# Patient Record
Sex: FEMALE | Race: WHITE | Employment: OTHER | URBAN - METROPOLITAN AREA
[De-identification: names, ages, dates, MRNs, and addresses within clinical notes are randomized per-mention and may not be internally consistent; named-entity substitution may affect disease eponyms.]

---

## 2017-05-24 ENCOUNTER — HOSPITAL ENCOUNTER (OUTPATIENT)
Dept: WOMENS IMAGING | Age: 82
Discharge: HOME | End: 2017-05-24
Payer: MEDICARE

## 2017-05-24 DIAGNOSIS — Z12.31 ENCOUNTER FOR SCREENING MAMMOGRAM FOR BREAST CANCER: ICD-10-CM

## 2017-05-24 PROCEDURE — G0202 SCR MAMMO BI INCL CAD: HCPCS

## 2017-05-31 ENCOUNTER — OFFICE VISIT (OUTPATIENT)
Dept: SURGERY | Age: 82
End: 2017-05-31

## 2017-05-31 VITALS
WEIGHT: 161.2 LBS | DIASTOLIC BLOOD PRESSURE: 72 MMHG | BODY MASS INDEX: 29.66 KG/M2 | HEIGHT: 62 IN | SYSTOLIC BLOOD PRESSURE: 136 MMHG | TEMPERATURE: 98.1 F

## 2017-05-31 DIAGNOSIS — N60.12 FIBROCYSTIC CHANGES OF LEFT BREAST: Primary | ICD-10-CM

## 2017-05-31 PROCEDURE — 99212 OFFICE O/P EST SF 10 MIN: CPT | Performed by: SURGERY

## 2017-05-31 RX ORDER — AMOXICILLIN AND CLAVULANATE POTASSIUM 875; 125 MG/1; MG/1
TABLET, FILM COATED ORAL
COMMUNITY
Start: 2017-05-26 | End: 2017-06-05

## 2018-03-27 ENCOUNTER — OFFICE VISIT (OUTPATIENT)
Dept: GASTROENTEROLOGY | Age: 83
End: 2018-03-27
Payer: MEDICARE

## 2018-03-27 VITALS
WEIGHT: 157 LBS | BODY MASS INDEX: 26.8 KG/M2 | SYSTOLIC BLOOD PRESSURE: 158 MMHG | RESPIRATION RATE: 16 BRPM | DIASTOLIC BLOOD PRESSURE: 76 MMHG | HEIGHT: 64 IN | HEART RATE: 74 BPM

## 2018-03-27 DIAGNOSIS — K92.1 BLACK STOOL: Primary | ICD-10-CM

## 2018-03-27 DIAGNOSIS — Z85.038 PERSONAL HISTORY OF COLON CANCER: ICD-10-CM

## 2018-03-27 PROCEDURE — 99203 OFFICE O/P NEW LOW 30 MIN: CPT | Performed by: INTERNAL MEDICINE

## 2018-03-27 NOTE — PROGRESS NOTES
Liset Pimentel  [de-identified] y.o. female  Referred by: Clau Hill MD    Medicines, social history, past medical history, surgical history, and allergies have been reviewed and updated as needed. Chief Complaint   Patient presents with    Stool Color Change     Black stools         HPI:   Patient is an [de-identified]year old WF with black stool for 3 days. Patient denies heartburn, indigestion or abdominal pain. Patient is S/P colon carcinomas and is due for a surveillance colonoscopy this year. ROS:    CONSTITUTIONAL:  Negative  EYES:  Negative  ENMT:  Negative  MUSCULOSKELETAL:  Negative  NEUROLOGICAL:  Negative  INTEGUMENTARY:  Negative  GASTROINTESTINAL:  See HPI  GENITOURINARY:  Negative  CARDIOVASCULAR:  Negative  RESPIRATORY:  Negative  HEM/LYMPH:  Negative  ENDOCRINE:  Negative  PSYCHIATRIC:  Negative  ALLERGIC/IMMUNO:  Negative  EXTREMITIES:  Negative  BREAST:  Negative        Physical Exam:  BP (!) 158/76 (Site: Left Arm, Position: Sitting)   Pulse 74   Resp 16   Ht 5' 4\" (1.626 m)   Wt 157 lb (71.2 kg)   BMI 26.95 kg/m²   Constitutional:  Patient appears well nourished, well developed, and hydrated. Alert and oriented x3 and appropriate. Non icteric and not pale. Eyes:  Pupils equal and reactive. Oropharynx:  Unremarkable. Neck/Thyroid: Neck is supple. No palpable nodules. No carotid bruits. Thyroid is symmetrical, without thyromegaly, masses, or palpable nodules. Respiratory:  Normal to inspection. Lungs clear to auscultation and percussion. No wheezes rhonchi or rales. Cardiovascular:  Regular rate and rhythm. No murmurs, gallops, or rubs. Abdomen:  Soft, no tenderness and non-distended. No organomegaly. No masses. No rebound or guarding. Normal bowel sounds. Integumentary:  The skin is unremarkable. No rashes. No suspicious lesions. Warm and dry. Neuro: Grossly intact. Cranial nerves, sensation and reflexes grossly intact. Musculoskeletal:  Unremarkable. Assessment:  1.  Black stool 2. Personal history of colon cancer         Plan:  Colonoscopy and EGD 3/30/18      Prep:  Jessica Gtz, transcribed the above note for Dr Velinda Sacks. Electronically signed by Leroy Marmolejo 3/27/18 4:25 PM        I , Velinda Sacks, have read and agree with the above documentation.   Electronically signed by Velinda Sacks, M.D. 3/28/18 3:58 PM

## 2018-03-30 ENCOUNTER — ANESTHESIA (OUTPATIENT)
Dept: ENDOSCOPY | Age: 83
End: 2018-03-30
Payer: MEDICARE

## 2018-03-30 ENCOUNTER — HOSPITAL ENCOUNTER (OUTPATIENT)
Age: 83
Setting detail: OUTPATIENT SURGERY
Discharge: HOME | End: 2018-03-30
Attending: INTERNAL MEDICINE | Admitting: INTERNAL MEDICINE
Payer: MEDICARE

## 2018-03-30 ENCOUNTER — ANESTHESIA EVENT (OUTPATIENT)
Dept: ENDOSCOPY | Age: 83
End: 2018-03-30
Payer: MEDICARE

## 2018-03-30 VITALS
SYSTOLIC BLOOD PRESSURE: 112 MMHG | DIASTOLIC BLOOD PRESSURE: 60 MMHG | RESPIRATION RATE: 16 BRPM | BODY MASS INDEX: 26.8 KG/M2 | OXYGEN SATURATION: 99 % | HEIGHT: 64 IN | TEMPERATURE: 97.9 F | WEIGHT: 157 LBS | HEART RATE: 61 BPM

## 2018-03-30 VITALS
SYSTOLIC BLOOD PRESSURE: 96 MMHG | RESPIRATION RATE: 13 BRPM | DIASTOLIC BLOOD PRESSURE: 52 MMHG | OXYGEN SATURATION: 95 %

## 2018-03-30 PROCEDURE — 3700000001 HC ADD 15 MINUTES (ANESTHESIA): Performed by: INTERNAL MEDICINE

## 2018-03-30 PROCEDURE — 2500000003 HC RX 250 WO HCPCS: Performed by: NURSE ANESTHETIST, CERTIFIED REGISTERED

## 2018-03-30 PROCEDURE — 3609027000 HC COLONOSCOPY: Performed by: INTERNAL MEDICINE

## 2018-03-30 PROCEDURE — 7100000010 HC PHASE II RECOVERY - FIRST 15 MIN: Performed by: INTERNAL MEDICINE

## 2018-03-30 PROCEDURE — 6360000002 HC RX W HCPCS: Performed by: NURSE ANESTHETIST, CERTIFIED REGISTERED

## 2018-03-30 PROCEDURE — 7100000011 HC PHASE II RECOVERY - ADDTL 15 MIN: Performed by: INTERNAL MEDICINE

## 2018-03-30 PROCEDURE — 43239 EGD BIOPSY SINGLE/MULTIPLE: CPT | Performed by: INTERNAL MEDICINE

## 2018-03-30 PROCEDURE — 3700000000 HC ANESTHESIA ATTENDED CARE: Performed by: INTERNAL MEDICINE

## 2018-03-30 PROCEDURE — 6370000000 HC RX 637 (ALT 250 FOR IP): Performed by: INTERNAL MEDICINE

## 2018-03-30 PROCEDURE — G0105 COLORECTAL SCRN; HI RISK IND: HCPCS | Performed by: INTERNAL MEDICINE

## 2018-03-30 PROCEDURE — 3609017100 HC EGD: Performed by: INTERNAL MEDICINE

## 2018-03-30 RX ORDER — LIDOCAINE HYDROCHLORIDE 20 MG/ML
INJECTION, SOLUTION INFILTRATION; PERINEURAL PRN
Status: DISCONTINUED | OUTPATIENT
Start: 2018-03-30 | End: 2018-03-30 | Stop reason: SDUPTHER

## 2018-03-30 RX ORDER — PROPOFOL 10 MG/ML
INJECTION, EMULSION INTRAVENOUS PRN
Status: DISCONTINUED | OUTPATIENT
Start: 2018-03-30 | End: 2018-03-30 | Stop reason: SDUPTHER

## 2018-03-30 RX ORDER — ONDANSETRON 2 MG/ML
4 INJECTION INTRAMUSCULAR; INTRAVENOUS
Status: DISCONTINUED | OUTPATIENT
Start: 2018-03-30 | End: 2018-03-30 | Stop reason: HOSPADM

## 2018-03-30 RX ORDER — SODIUM CHLORIDE 9 MG/ML
INJECTION, SOLUTION INTRAVENOUS CONTINUOUS
Status: DISCONTINUED | OUTPATIENT
Start: 2018-03-30 | End: 2018-03-30 | Stop reason: HOSPADM

## 2018-03-30 RX ADMIN — PROPOFOL 20 MG: 10 INJECTION, EMULSION INTRAVENOUS at 11:28

## 2018-03-30 RX ADMIN — PROPOFOL 20 MG: 10 INJECTION, EMULSION INTRAVENOUS at 11:49

## 2018-03-30 RX ADMIN — PROPOFOL 20 MG: 10 INJECTION, EMULSION INTRAVENOUS at 11:41

## 2018-03-30 RX ADMIN — PROPOFOL 20 MG: 10 INJECTION, EMULSION INTRAVENOUS at 11:36

## 2018-03-30 RX ADMIN — LIDOCAINE HYDROCHLORIDE 40 MG: 20 INJECTION, SOLUTION INFILTRATION; PERINEURAL at 11:27

## 2018-03-30 RX ADMIN — PROPOFOL 20 MG: 10 INJECTION, EMULSION INTRAVENOUS at 11:30

## 2018-03-30 RX ADMIN — PROPOFOL 20 MG: 10 INJECTION, EMULSION INTRAVENOUS at 11:34

## 2018-03-30 RX ADMIN — PROPOFOL 20 MG: 10 INJECTION, EMULSION INTRAVENOUS at 11:46

## 2018-03-30 RX ADMIN — LIDOCAINE HYDROCHLORIDE 15 ML: 20 SOLUTION ORAL; TOPICAL at 11:25

## 2018-03-30 RX ADMIN — PROPOFOL 20 MG: 10 INJECTION, EMULSION INTRAVENOUS at 11:44

## 2018-03-30 RX ADMIN — PROPOFOL 20 MG: 10 INJECTION, EMULSION INTRAVENOUS at 11:32

## 2018-03-30 RX ADMIN — PROPOFOL 20 MG: 10 INJECTION, EMULSION INTRAVENOUS at 11:38

## 2018-03-30 RX ADMIN — PROPOFOL 30 MG: 10 INJECTION, EMULSION INTRAVENOUS at 11:27

## 2018-03-30 ASSESSMENT — PAIN - FUNCTIONAL ASSESSMENT: PAIN_FUNCTIONAL_ASSESSMENT: 0-10

## 2018-03-30 ASSESSMENT — LIFESTYLE VARIABLES: SMOKING_STATUS: 1

## 2018-03-30 NOTE — ANESTHESIA PRE PROCEDURE
allergies J30.2       Past Medical History:        Diagnosis Date    Allergic rhinitis     [facility] [address] [address]     RT BREAST    Cecal cancer ([facility] [address]     Seasonal allergies        Past Surgical History:        Procedure Laterality Date    APPENDECTOMY      CATARACT REMOVAL      CHOLECYSTECTOMY  1970    COLONOSCOPY  4/09 & 1/11    COLONOSCOPY  1/9/13,1/10/11          COLONOSCOPY  1/21/15    polypectomy     HERNIA REPAIR  12/11    ING. HERNIA REPAIN W/ MESH    MASTECTOMY  87    RIGHT    OTHER SURGICAL HISTORY      EXC. BACK SKIN CYST    OTHER SURGICAL HISTORY  12/15/2009    PORT REMOVAL    RIGHT COLECTOMY  04/28/09    CECAL CANCER    TUNNELED VENOUS PORT PLACEMENT  06/11/09    REMOVAL 12/09       Social History:    Social History   Substance Use Topics    Smoking status: Current Every Day Smoker     Packs/day: 0.30    Smokeless tobacco: Not on file    Alcohol use No                                Ready to quit: Not Answered  Counseling given: Not Answered      Vital Signs (Current): There were no vitals filed for this visit.                                            BP Readings from Last 3 Encounters:   03/27/18 (!) 158/76   05/31/17 136/72   08/12/16 138/75       NPO Status:                                                                                 BMI:   Wt Readings from Last 3 Encounters:   03/27/18 157 lb (71.2 kg)   05/31/17 161 lb 3.2 oz (73.1 kg)   08/12/16 163 lb 12.8 oz (74.3 kg)     There is no height or weight on file to calculate BMI.    CBC:   Lab Results   Component Value Date    WBC 6.3 07/08/2016    RBC 5.03 07/08/2016    RBC 3.73 01/01/2012    HGB 15.4 07/08/2016    HCT 46.5 07/08/2016    MCV 92.3 07/08/2016    RDW 13.4 07/08/2016     07/08/2016       CMP:   Lab Results   Component Value Date     09/26/2017    K 4.4 09/26/2017    CL 97 09/26/2017    CO2 30 09/26/2017    BUN 17 09/26/2017    CREATININE 0.85 09/26/2017    GFRAA >60.0 09/26/2017

## 2018-03-30 NOTE — ANESTHESIA POSTPROCEDURE EVALUATION
Department of Anesthesiology  Postprocedure Note    Patient: Yen Daniel  MRN: [id number]  YOB: 1935  Date of evaluation: 3/30/2018  Time:  11:52 AM     Procedure Summary     Date:  03/30/18 Room / Location:  [address]    Anesthesia Start:  6534 Anesthesia Stop:  1152    Procedures:       EGD ESOPHAGOGASTRODUODENOSCOPY (N/A )      COLONOSCOPY (N/A ) Diagnosis:  (BLACK STOOLS, PERS H/O COLON CA K92.1, C189, K92.1 (00223, 51961))    Surgeon:  Ezio Howard MD Responsible Provider:  Pb Busch CRNA    Anesthesia Type:  MAC ASA Status:  2          Anesthesia Type: MAC    Dorian Phase I: Dorian Score: 10    Dorian Phase II:      Last vitals: Reviewed and per EMR flowsheets.        Anesthesia Post Evaluation    Patient location during evaluation: bedside  Patient participation: waiting for patient participation  Level of consciousness: sleepy but conscious  Pain score: 0  Airway patency: patent  Nausea & Vomiting: no nausea and no vomiting  Complications: no  Cardiovascular status: blood pressure returned to baseline and hemodynamically stable  Respiratory status: acceptable  Hydration status: euvolemic

## 2018-04-19 ENCOUNTER — OFFICE VISIT (OUTPATIENT)
Dept: GASTROENTEROLOGY | Age: 83
End: 2018-04-19
Payer: MEDICARE

## 2018-04-19 VITALS
BODY MASS INDEX: 26.61 KG/M2 | WEIGHT: 155 LBS | DIASTOLIC BLOOD PRESSURE: 62 MMHG | HEART RATE: 76 BPM | SYSTOLIC BLOOD PRESSURE: 142 MMHG

## 2018-04-19 DIAGNOSIS — K25.3 ACUTE GASTRIC ULCER WITHOUT HEMORRHAGE OR PERFORATION: ICD-10-CM

## 2018-04-19 DIAGNOSIS — K92.1 MELENA: Primary | ICD-10-CM

## 2018-04-19 PROCEDURE — 99212 OFFICE O/P EST SF 10 MIN: CPT | Performed by: INTERNAL MEDICINE

## 2018-04-19 RX ORDER — OMEPRAZOLE 40 MG/1
CAPSULE, DELAYED RELEASE ORAL
Refills: 3 | COMMUNITY
Start: 2018-03-30 | End: 2019-06-03 | Stop reason: ALTCHOICE

## 2018-04-19 RX ORDER — TRAMADOL HYDROCHLORIDE 50 MG/1
TABLET ORAL
COMMUNITY
Start: 2018-04-17 | End: 2018-07-16

## 2018-05-18 ENCOUNTER — TELEPHONE (OUTPATIENT)
Dept: SURGERY | Age: 83
End: 2018-05-18

## 2018-05-18 DIAGNOSIS — Z12.31 SCREENING MAMMOGRAM, ENCOUNTER FOR: Primary | ICD-10-CM

## 2018-05-29 ENCOUNTER — HOSPITAL ENCOUNTER (OUTPATIENT)
Dept: WOMENS IMAGING | Age: 83
Discharge: HOME | End: 2018-05-31
Payer: MEDICARE

## 2018-05-29 DIAGNOSIS — Z12.31 SCREENING MAMMOGRAM, ENCOUNTER FOR: ICD-10-CM

## 2018-05-29 PROCEDURE — 77067 SCR MAMMO BI INCL CAD: CPT

## 2019-06-07 ENCOUNTER — TELEPHONE (OUTPATIENT)
Dept: SURGERY | Age: 84
End: 2019-06-07

## 2019-06-07 DIAGNOSIS — Z12.31 SCREENING MAMMOGRAM, ENCOUNTER FOR: Primary | ICD-10-CM

## 2019-06-07 NOTE — TELEPHONE ENCOUNTER
----- Message from [address] sent at 6/7/2019  8:46 AM EDT -----  Contact: [phone]  See Below  ----- Message -----  From: Fab Giles  Sent: 6/6/2019   2:43 PM  To: Hai Ramires    Pt needs annual katie order

## 2019-06-17 ENCOUNTER — HOSPITAL ENCOUNTER (OUTPATIENT)
Dept: WOMENS IMAGING | Age: 84
Discharge: HOME | End: 2019-06-19
Payer: MEDICARE

## 2019-06-17 DIAGNOSIS — Z12.31 SCREENING MAMMOGRAM, ENCOUNTER FOR: ICD-10-CM

## 2019-06-17 PROCEDURE — 77067 SCR MAMMO BI INCL CAD: CPT

## 2019-07-01 ENCOUNTER — OFFICE VISIT (OUTPATIENT)
Dept: SURGERY | Age: 84
End: 2019-07-01
Payer: MEDICARE

## 2019-07-01 VITALS
TEMPERATURE: 98.2 F | HEIGHT: 64 IN | BODY MASS INDEX: 26.29 KG/M2 | SYSTOLIC BLOOD PRESSURE: 122 MMHG | DIASTOLIC BLOOD PRESSURE: 78 MMHG | WEIGHT: 154 LBS

## 2019-07-01 DIAGNOSIS — N60.12 FIBROCYSTIC DISEASE OF LEFT BREAST: Primary | ICD-10-CM

## 2019-07-01 PROCEDURE — 99213 OFFICE O/P EST LOW 20 MIN: CPT | Performed by: SURGERY

## 2019-07-01 RX ORDER — TRAMADOL HYDROCHLORIDE 50 MG/1
TABLET ORAL
COMMUNITY
Start: 2019-05-14 | End: 2019-08-09

## 2019-07-01 NOTE — PROGRESS NOTES
Subjective:      Patient ID: Tory Box is a 82 y.o. female. HPI  She is here today for a breast exam and review of her recent mammograms. She has a remote history of right breast cancer s/p mastectomy per Dr Patricia Hdez. She also has a history of colon cancer and is s/p a right colectomy in 2009. Her last colonoscopy was last year. Past Medical History:   Diagnosis Date    Allergic rhinitis     Arthritis     Breast cancer ([facility] [address]     RT BREAST    Cecal cancer ([facility] [address]     Diverticulitis     HTN (hypertension)     Hypercholesterolemia     Seasonal allergies     Small bowel obstruction [facility])      Past Surgical History:   Procedure Laterality Date    APPENDECTOMY      CATARACT REMOVAL      CHOLECYSTECTOMY  1970    COLONOSCOPY  4/09 & 1/11    COLONOSCOPY  1/9/13,1/10/11          COLONOSCOPY  1/21/15    polypectomy     HERNIA REPAIR  12/11    ING. HERNIA REPAIN W/ MESH    MASTECTOMY, MODIFIED RADICAL Right 1987    OTHER SURGICAL HISTORY      EXC. BACK SKIN CYST    OTHER SURGICAL HISTORY  12/15/2009    PORT REMOVAL    RI COLONOSCOPY FLX DX W/COLLJ SPEC WHEN PFRMD N/A 3/30/2018    [facility] ESOPHAGOGASTRODUODENOSCOPY TRANSORAL DIAGNOSTIC N/A 3/30/2018    EGD ESOPHAGOGASTRODUODENOSCOPY performed by [facility] MD at [facility]  04/28/09    CECAL CANCER    TUNNELED VENOUS PORT PLACEMENT  06/11/09    REMOVAL 12/09     Current Outpatient Medications on File Prior to Visit   Medication Sig Dispense Refill    traMADol (ULTRAM) 50 MG tablet One to two tablets karimi as needed      Mastectomy Bra MISC QTY; 4  Diagnosis; c50.911 2 each 0    Probiotic Product ([employer] PO) Take by mouth      Omega-3 Fatty Acids (FISH OIL) 1000 MG CAPS Take 3,000 mg by mouth daily.  B Complex Vitamins (B COMPLEX 1 PO) Take  by mouth.         Cholecalciferol (VITAMIN

## 2020-06-02 ENCOUNTER — OFFICE VISIT (OUTPATIENT)
Dept: UROLOGY | Age: 85
End: 2020-06-02
Payer: MEDICARE

## 2020-06-02 VITALS
WEIGHT: 157 LBS | HEIGHT: 65 IN | BODY MASS INDEX: 26.16 KG/M2 | HEART RATE: 59 BPM | OXYGEN SATURATION: 98 % | SYSTOLIC BLOOD PRESSURE: 134 MMHG | DIASTOLIC BLOOD PRESSURE: 88 MMHG

## 2020-06-02 DIAGNOSIS — R31.0 GROSS HEMATURIA: ICD-10-CM

## 2020-06-02 LAB
ANION GAP SERPL CALCULATED.3IONS-SCNC: 14 MEQ/L (ref 9–15)
BILIRUBIN, POC: ABNORMAL
BLOOD URINE, POC: ABNORMAL
BUN BLDV-MCNC: 15 MG/DL (ref 8–23)
CALCIUM SERPL-MCNC: 10.2 MG/DL (ref 8.5–9.9)
CHLORIDE BLD-SCNC: 100 MEQ/L (ref 95–107)
CLARITY, POC: CLEAR
CO2: 29 MEQ/L (ref 20–31)
COLOR, POC: YELLOW
CREAT SERPL-MCNC: 0.84 MG/DL (ref 0.5–0.9)
GFR AFRICAN AMERICAN: >60
GFR NON-AFRICAN AMERICAN: >60
GLUCOSE BLD-MCNC: 104 MG/DL (ref 70–99)
GLUCOSE URINE, POC: ABNORMAL
HCT VFR BLD CALC: 46.1 % (ref 37–47)
HEMOGLOBIN: 15.6 G/DL (ref 12–16)
KETONES, POC: ABNORMAL
LEUKOCYTE EST, POC: ABNORMAL
MCH RBC QN AUTO: 32.4 PG (ref 27–31.3)
MCHC RBC AUTO-ENTMCNC: 33.9 % (ref 33–37)
MCV RBC AUTO: 95.6 FL (ref 82–100)
NITRITE, POC: ABNORMAL
PDW BLD-RTO: 13.4 % (ref 11.5–14.5)
PH, POC: 7.5
PLATELET # BLD: 237 K/UL (ref 130–400)
POTASSIUM SERPL-SCNC: 3.6 MEQ/L (ref 3.4–4.9)
PROTEIN, POC: ABNORMAL
RBC # BLD: 4.82 M/UL (ref 4.2–5.4)
SODIUM BLD-SCNC: 143 MEQ/L (ref 135–144)
SPECIFIC GRAVITY, POC: 1.02
UROBILINOGEN, POC: 0.2
WBC # BLD: 5.6 K/UL (ref 4.8–10.8)

## 2020-06-02 PROCEDURE — 99203 OFFICE O/P NEW LOW 30 MIN: CPT | Performed by: UROLOGY

## 2020-06-02 PROCEDURE — 81003 URINALYSIS AUTO W/O SCOPE: CPT | Performed by: UROLOGY

## 2020-06-02 NOTE — PROGRESS NOTES
hematuria  Plan  Urine culture  CT urogram  Office cystoscopy  Greater than 50% of 35 minutes spent consulting patient face-to-face  Orders Placed This Encounter   Procedures    Culture, Urine     Standing Status:   Future     Standing Expiration Date:   6/2/2021     Order Specific Question:   Specify (ex-cath, midstream, cysto, etc)? Answer:   m    CT ABDOMEN PELVIS W WO CONTRAST Additional Contrast? None     Standing Status:   Future     Standing Expiration Date:   6/2/2021     Order Specific Question:   Additional Contrast?     Answer:   None     Order Specific Question:   Reason for exam:     Answer:   gross hematuria    Basic Metabolic Panel     Standing Status:   Future     Number of Occurrences:   1     Standing Expiration Date:   6/2/2021    CBC     Standing Status:   Future     Number of Occurrences:   1     Standing Expiration Date:   6/2/2021    CHG RADIOLOGIC EXAM ABDOMEN 1 VIEW    CHG RADIOLOGIC EXAM ABDOMEN 1 VIEW    POCT Urinalysis No Micro (Auto)     No orders of the defined types were placed in this encounter. Yaa Perea MD       Please note this report has been partially produced using speech recognition software  And may cause contain errors related to that system including grammar, punctuation and spelling as well as words and phrases that may seem inappropriate. If there are questions or concerns please feel free to contact me to clarify.

## 2020-06-04 LAB — URINE CULTURE, ROUTINE: NORMAL

## 2020-06-18 ENCOUNTER — HOSPITAL ENCOUNTER (OUTPATIENT)
Dept: CT IMAGING | Age: 85
Discharge: HOME | End: 2020-06-20
Payer: MEDICARE

## 2020-06-18 ENCOUNTER — HOSPITAL ENCOUNTER (OUTPATIENT)
Dept: WOMENS IMAGING | Age: 85
Discharge: HOME | End: 2020-06-20
Payer: MEDICARE

## 2020-06-18 ENCOUNTER — HOSPITAL ENCOUNTER (OUTPATIENT)
Dept: LAB | Age: 85
Discharge: HOME | End: 2020-06-18
Payer: MEDICARE

## 2020-06-18 LAB
ALBUMIN SERPL-MCNC: 4.4 G/DL (ref 3.5–4.6)
ALP BLD-CCNC: 91 U/L (ref 40–130)
ALT SERPL-CCNC: 15 U/L (ref 0–33)
ANION GAP SERPL CALCULATED.3IONS-SCNC: 13 MEQ/L (ref 9–15)
AST SERPL-CCNC: 23 U/L (ref 0–35)
BILIRUB SERPL-MCNC: 0.6 MG/DL (ref 0.2–0.7)
BUN BLDV-MCNC: 18 MG/DL (ref 8–23)
CALCIUM SERPL-MCNC: 10.4 MG/DL (ref 8.5–9.9)
CHLORIDE BLD-SCNC: 100 MEQ/L (ref 95–107)
CO2: 25 MEQ/L (ref 20–31)
CREAT SERPL-MCNC: 0.76 MG/DL (ref 0.5–0.9)
GFR AFRICAN AMERICAN: >60
GFR NON-AFRICAN AMERICAN: >60
GLOBULIN: 3.3 G/DL (ref 2.3–3.5)
GLUCOSE BLD-MCNC: 124 MG/DL (ref 70–99)
HCT VFR BLD CALC: 48.4 % (ref 37–47)
HEMOGLOBIN: 15.9 G/DL (ref 12–16)
MCH RBC QN AUTO: 31.3 PG (ref 27–31.3)
MCHC RBC AUTO-ENTMCNC: 32.8 % (ref 33–37)
MCV RBC AUTO: 95.4 FL (ref 82–100)
PDW BLD-RTO: 13.8 % (ref 11.5–14.5)
PLATELET # BLD: 227 K/UL (ref 130–400)
POTASSIUM SERPL-SCNC: 3.9 MEQ/L (ref 3.4–4.9)
RBC # BLD: 5.07 M/UL (ref 4.2–5.4)
SODIUM BLD-SCNC: 138 MEQ/L (ref 135–144)
TOTAL PROTEIN: 7.7 G/DL (ref 6.3–8)
WBC # BLD: 6.5 K/UL (ref 4.8–10.8)

## 2020-06-18 PROCEDURE — 80053 COMPREHEN METABOLIC PANEL: CPT

## 2020-06-18 PROCEDURE — 77067 SCR MAMMO BI INCL CAD: CPT

## 2020-06-18 PROCEDURE — 36415 COLL VENOUS BLD VENIPUNCTURE: CPT

## 2020-06-18 PROCEDURE — 85027 COMPLETE CBC AUTOMATED: CPT

## 2020-06-19 ENCOUNTER — TELEPHONE (OUTPATIENT)
Dept: UROLOGY | Age: 85
End: 2020-06-19

## 2020-06-19 NOTE — TELEPHONE ENCOUNTER
Pt is scheduled for Cysto and Ct results on Monday. She could not complete the CT because they couldn't get a vein. It is rescheduled for Tuesday. Do you still want to do the cysto Monday?

## 2020-06-22 ENCOUNTER — PROCEDURE VISIT (OUTPATIENT)
Dept: UROLOGY | Age: 85
End: 2020-06-22
Payer: MEDICARE

## 2020-06-22 VITALS
OXYGEN SATURATION: 98 % | WEIGHT: 155 LBS | SYSTOLIC BLOOD PRESSURE: 132 MMHG | HEIGHT: 65 IN | HEART RATE: 66 BPM | BODY MASS INDEX: 25.83 KG/M2 | DIASTOLIC BLOOD PRESSURE: 80 MMHG

## 2020-06-22 LAB
BILIRUBIN, POC: ABNORMAL
BLOOD URINE, POC: ABNORMAL
CLARITY, POC: CLEAR
COLOR, POC: YELLOW
GLUCOSE URINE, POC: ABNORMAL
KETONES, POC: ABNORMAL
LEUKOCYTE EST, POC: ABNORMAL
NITRITE, POC: ABNORMAL
PH, POC: 7.5
PROTEIN, POC: ABNORMAL
SPECIFIC GRAVITY, POC: 1.01
UROBILINOGEN, POC: 0.2

## 2020-06-22 PROCEDURE — 52000 CYSTOURETHROSCOPY: CPT | Performed by: UROLOGY

## 2020-06-22 PROCEDURE — 81003 URINALYSIS AUTO W/O SCOPE: CPT | Performed by: UROLOGY

## 2020-06-22 RX ORDER — SULFAMETHOXAZOLE AND TRIMETHOPRIM 800; 160 MG/1; MG/1
1 TABLET ORAL ONCE
Qty: 1 TABLET | Refills: 0 | COMMUNITY
Start: 2020-06-22 | End: 2020-06-22

## 2020-06-23 ENCOUNTER — HOSPITAL ENCOUNTER (OUTPATIENT)
Dept: CT IMAGING | Age: 85
Discharge: HOME | End: 2020-06-25
Payer: MEDICARE

## 2020-06-23 PROCEDURE — 74178 CT ABD&PLV WO CNTR FLWD CNTR: CPT

## 2020-06-23 PROCEDURE — 6360000004 HC RX CONTRAST MEDICATION: Performed by: UROLOGY

## 2020-06-23 RX ADMIN — IOPAMIDOL 100 ML: 755 INJECTION, SOLUTION INTRAVENOUS at 13:53

## 2020-06-30 ENCOUNTER — HOSPITAL ENCOUNTER (OUTPATIENT)
Dept: PREADMISSION TESTING | Age: 85
Discharge: HOME | End: 2020-07-04
Payer: MEDICARE

## 2020-06-30 ENCOUNTER — NURSE ONLY (OUTPATIENT)
Dept: PRIMARY CARE CLINIC | Age: 85
End: 2020-06-30

## 2020-06-30 VITALS
OXYGEN SATURATION: 96 % | HEIGHT: 64 IN | TEMPERATURE: 98.7 F | SYSTOLIC BLOOD PRESSURE: 159 MMHG | RESPIRATION RATE: 16 BRPM | BODY MASS INDEX: 26.5 KG/M2 | WEIGHT: 155.2 LBS | DIASTOLIC BLOOD PRESSURE: 64 MMHG | HEART RATE: 83 BPM

## 2020-06-30 PROBLEM — D49.4 BLADDER TUMOR: Status: ACTIVE | Noted: 2020-06-30

## 2020-06-30 PROBLEM — F17.200 TOBACCO USE DISORDER: Chronic | Status: ACTIVE | Noted: 2020-06-30

## 2020-06-30 LAB
ABO/RH: NORMAL
ANION GAP SERPL CALCULATED.3IONS-SCNC: 9 MEQ/L (ref 9–15)
ANTIBODY SCREEN: NORMAL
BUN BLDV-MCNC: 12 MG/DL (ref 8–23)
CALCIUM SERPL-MCNC: 10.4 MG/DL (ref 8.5–9.9)
CHLORIDE BLD-SCNC: 100 MEQ/L (ref 95–107)
CO2: 33 MEQ/L (ref 20–31)
CREAT SERPL-MCNC: 0.77 MG/DL (ref 0.5–0.9)
EKG ATRIAL RATE: 72 BPM
EKG P AXIS: 78 DEGREES
EKG P-R INTERVAL: 156 MS
EKG Q-T INTERVAL: 414 MS
EKG QRS DURATION: 82 MS
EKG QTC CALCULATION (BAZETT): 453 MS
EKG R AXIS: 62 DEGREES
EKG T AXIS: 67 DEGREES
EKG VENTRICULAR RATE: 72 BPM
GFR AFRICAN AMERICAN: >60
GFR NON-AFRICAN AMERICAN: >60
GLUCOSE BLD-MCNC: 86 MG/DL (ref 70–99)
HCT VFR BLD CALC: 45.3 % (ref 37–47)
HEMOGLOBIN: 15.3 G/DL (ref 12–16)
MCH RBC QN AUTO: 32.1 PG (ref 27–31.3)
MCHC RBC AUTO-ENTMCNC: 33.9 % (ref 33–37)
MCV RBC AUTO: 94.9 FL (ref 82–100)
PDW BLD-RTO: 13.6 % (ref 11.5–14.5)
PLATELET # BLD: 239 K/UL (ref 130–400)
POTASSIUM SERPL-SCNC: 4.4 MEQ/L (ref 3.4–4.9)
RBC # BLD: 4.77 M/UL (ref 4.2–5.4)
SODIUM BLD-SCNC: 142 MEQ/L (ref 135–144)
WBC # BLD: 4.6 K/UL (ref 4.8–10.8)

## 2020-06-30 PROCEDURE — 86850 RBC ANTIBODY SCREEN: CPT

## 2020-06-30 PROCEDURE — 86900 BLOOD TYPING SEROLOGIC ABO: CPT

## 2020-06-30 PROCEDURE — 85027 COMPLETE CBC AUTOMATED: CPT

## 2020-06-30 PROCEDURE — 93005 ELECTROCARDIOGRAM TRACING: CPT | Performed by: NURSE PRACTITIONER

## 2020-06-30 PROCEDURE — U0003 INFECTIOUS AGENT DETECTION BY NUCLEIC ACID (DNA OR RNA); SEVERE ACUTE RESPIRATORY SYNDROME CORONAVIRUS 2 (SARS-COV-2) (CORONAVIRUS DISEASE [COVID-19]), AMPLIFIED PROBE TECHNIQUE, MAKING USE OF HIGH THROUGHPUT TECHNOLOGIES AS DESCRIBED BY CMS-2020-01-R: HCPCS

## 2020-06-30 PROCEDURE — 86901 BLOOD TYPING SEROLOGIC RH(D): CPT

## 2020-06-30 PROCEDURE — 80048 BASIC METABOLIC PNL TOTAL CA: CPT

## 2020-06-30 RX ORDER — SODIUM CHLORIDE, SODIUM LACTATE, POTASSIUM CHLORIDE, CALCIUM CHLORIDE 600; 310; 30; 20 MG/100ML; MG/100ML; MG/100ML; MG/100ML
INJECTION, SOLUTION INTRAVENOUS CONTINUOUS
Status: CANCELLED | OUTPATIENT
Start: 2020-07-08

## 2020-06-30 RX ORDER — LIDOCAINE HYDROCHLORIDE 10 MG/ML
1 INJECTION, SOLUTION EPIDURAL; INFILTRATION; INTRACAUDAL; PERINEURAL
Status: CANCELLED | OUTPATIENT
Start: 2020-07-08 | End: 2020-07-08

## 2020-06-30 RX ORDER — SODIUM CHLORIDE 0.9 % (FLUSH) 0.9 %
10 SYRINGE (ML) INJECTION EVERY 12 HOURS SCHEDULED
Status: CANCELLED | OUTPATIENT
Start: 2020-07-08

## 2020-06-30 RX ORDER — TRAMADOL HYDROCHLORIDE 50 MG/1
25 TABLET ORAL EVERY 8 HOURS
COMMUNITY
Start: 2020-06-10

## 2020-06-30 RX ORDER — SODIUM CHLORIDE 0.9 % (FLUSH) 0.9 %
10 SYRINGE (ML) INJECTION PRN
Status: CANCELLED | OUTPATIENT
Start: 2020-07-08

## 2020-06-30 ASSESSMENT — ENCOUNTER SYMPTOMS
ALLERGIC/IMMUNOLOGIC NEGATIVE: 1
ABDOMINAL PAIN: 0
BACK PAIN: 0
SHORTNESS OF BREATH: 0
VOMITING: 0
NAUSEA: 0
STRIDOR: 0
CONSTIPATION: 0
DIARRHEA: 0
TROUBLE SWALLOWING: 0
SORE THROAT: 0
WHEEZING: 0
CHEST TIGHTNESS: 0
EYES NEGATIVE: 1
COUGH: 0

## 2020-06-30 NOTE — H&P
Nurse Practitioner History and Physical      CHIEF COMPLAINT:  Bladder surgery    HISTORY OF PRESENT ILLNESS:      The patient is a 82 y.o. female with significant past medical history of bladder tumor who presents for cystoscopy, TURBT ( resectoscope ), bilateral retrograde pyelogram. States episode of tinge of pink on toilet tissue & then noted red urine in toilet. Denies any other sx. To PCP then to urology. Flexible cytoscopy done in office & per patient polyp was identified. Denies any further blood. States chronic urgency which has not changed. Scheduled for OR. Past Medical History:        Diagnosis Date    Allergic rhinitis     Arthritis     Breast cancer ([facility] [address]     RT BREAST--dx 1986--oral chemo    Cecal cancer ([facility] [address]     dx 2002--chemo    Diverticulitis     HTN (hypertension)     meds > 20 yrs    Hypercholesterolemia     past trx with meds -- off meds > 5 yrs    Seasonal allergies     Small bowel obstruction ([facility] [address]      Past Surgical History:    Past Surgical History:   Procedure Laterality Date    APPENDECTOMY      age 37s   [address]    mastectomy   [address] [facility] COLONOSCOPY  4/09 & 1/11    COLONOSCOPY  1/9/13,1/10/11          COLONOSCOPY  1/21/15    polypectomy     ENDOSCOPY, COLON, DIAGNOSTIC      EYE SURGERY      Phaco with IOL OU    EYE SURGERY      Lasix OU    HERNIA REPAIR Right 12/11    ING. HERNIA REPAIN W/ MESH    MASTECTOMY, MODIFIED RADICAL Right 1987    OTHER SURGICAL HISTORY      EXC.  BACK SKIN CYST    OTHER SURGICAL HISTORY  12/15/2009    PORT REMOVAL    HI COLONOSCOPY FLX DX W/COLLJ SPEC WHEN PFRMD N/A 3/30/2018    COLONOSCOPY performed by Zulay Chicas MD at [facility] [address] ESOPHAGOGASTRODUODENOSCOPY TRANSORAL DIAGNOSTIC N/A 3/30/2018    EGD ESOPHAGOGASTRODUODENOSCOPY performed by Zulay Chicas MD at [facility]  04/28/09    CECAL CANCER    connections     Talks on phone: Not on file     Gets together: Not on file     Attends Zoroastrian service: Not on file     Active member of club or organization: Not on file     Attends meetings of clubs or organizations: Not on file     Relationship status: Not on file    Intimate partner violence     Fear of current or ex partner: Not on file     Emotionally abused: Not on file     Physically abused: Not on file     Forced sexual activity: Not on file   Other Topics Concern    Not on file   Social History Narrative    Not on file       Family History:       Problem Relation Age of Onset    Heart Disease Mother     Dementia Mother     Colon Polyps Mother     Other Father         Unknown    Diabetes Father     Obesity Father        Review of Systems   Constitutional: Negative. Negative for chills and fever. HENT: Positive for congestion (sinus), hearing loss (right worse than left), postnasal drip and tinnitus (bilateral). Negative for sore throat and trouble swallowing. Eyes: Negative. Negative for visual disturbance. Respiratory: Negative for cough, chest tightness, shortness of breath, wheezing and stridor. Cardiovascular: Negative for chest pain and palpitations. Gastrointestinal: Negative for abdominal pain, constipation, diarrhea, nausea and vomiting. Endocrine: Negative. Genitourinary: Positive for urgency. Negative for dysuria and frequency. Hematuria   Musculoskeletal: Negative for back pain, myalgias and neck pain. Skin: Negative. Allergic/Immunologic: Negative. Neurological: Negative. Negative for seizures and headaches. Hematological: Negative. Psychiatric/Behavioral: Negative. Vitals:  BP (!) 159/64   Pulse 83   Temp 98.7 °F (37.1 °C) (Temporal)   Resp 16   Ht 5' 4\" (1.626 m)   Wt 155 lb 3.2 oz (70.4 kg)   LMP 06/30/1980   SpO2 96%   BMI 26.64 kg/m²     Physical Exam  Constitutional:       Appearance: She is well-developed.    HENT: Head: Normocephalic and atraumatic. Right Ear: Tympanic membrane, ear canal and external ear normal.      Left Ear: Tympanic membrane and external ear normal.      Nose: No congestion. Mouth/Throat:      Mouth: Mucous membranes are dry. Eyes:      General: No scleral icterus. Extraocular Movements: Extraocular movements intact. Conjunctiva/sclera: Conjunctivae normal.      Pupils: Pupils are equal, round, and reactive to light. Comments: IOL OU. Neck:      Musculoskeletal: Normal range of motion. Thyroid: No thyromegaly. Vascular: No JVD. Trachea: No tracheal deviation. Cardiovascular:      Rate and Rhythm: Normal rate and regular rhythm. Heart sounds: Normal heart sounds. No murmur. No gallop. Pulmonary:      Effort: Pulmonary effort is normal. No respiratory distress. Breath sounds: Normal breath sounds. No wheezing or rales. Abdominal:      General: Bowel sounds are normal.      Palpations: Abdomen is soft. Tenderness: There is no abdominal tenderness. Genitourinary:     Comments: Deferred. Musculoskeletal: Normal range of motion. Right lower leg: No edema. Left lower leg: No edema. Lymphadenopathy:      Cervical: No cervical adenopathy. Skin:     General: Skin is warm and dry. Findings: No erythema. Neurological:      Mental Status: She is alert and oriented to person, place, and time. Gait: Gait abnormal (ambulates with cane). Psychiatric:         Mood and Affect: Mood normal.         Behavior: Behavior normal.         Thought Content:  Thought content normal.         Judgment: Judgment normal.         [unfilled]    Assessment:  Patient Active Problem List   Diagnosis    Cancer ([facility] [address]    Cecal cancer ([facility] [address]    Seasonal allergies    Blood in stool    Gastric ulcer without hemorrhage, perforation, or obstruction    Personal history of colon cancer    Diverticulosis of large intestine without diverticulitis    Bladder tumor    Vitamin D deficiency    Tobacco use disorder         Plan:  Scheduled for cystoscopy, TURBT ( resectoscope ), bilateral retrograde pyelogram.    SUSY Iverson - CNP  6/30/2020  9:53 AM

## 2020-07-01 LAB
SARS-COV-2: NOT DETECTED
SOURCE: NORMAL

## 2020-07-01 PROCEDURE — 93010 ELECTROCARDIOGRAM REPORT: CPT | Performed by: INTERNAL MEDICINE

## 2020-07-08 ENCOUNTER — ANESTHESIA (OUTPATIENT)
Dept: OPERATING ROOM | Age: 85
End: 2020-07-08
Payer: MEDICARE

## 2020-07-08 ENCOUNTER — ANESTHESIA EVENT (OUTPATIENT)
Dept: OPERATING ROOM | Age: 85
End: 2020-07-08
Payer: MEDICARE

## 2020-07-08 ENCOUNTER — HOSPITAL ENCOUNTER (OUTPATIENT)
Dept: GENERAL RADIOLOGY | Age: 85
Setting detail: OUTPATIENT SURGERY
Discharge: HOME | End: 2020-07-10
Attending: UROLOGY
Payer: MEDICARE

## 2020-07-08 ENCOUNTER — HOSPITAL ENCOUNTER (OUTPATIENT)
Age: 85
Setting detail: OUTPATIENT SURGERY
Discharge: HOME | End: 2020-07-08
Attending: UROLOGY | Admitting: UROLOGY
Payer: MEDICARE

## 2020-07-08 VITALS
RESPIRATION RATE: 16 BRPM | SYSTOLIC BLOOD PRESSURE: 152 MMHG | OXYGEN SATURATION: 95 % | DIASTOLIC BLOOD PRESSURE: 69 MMHG | TEMPERATURE: 97.2 F | HEART RATE: 75 BPM

## 2020-07-08 VITALS — DIASTOLIC BLOOD PRESSURE: 83 MMHG | SYSTOLIC BLOOD PRESSURE: 168 MMHG | OXYGEN SATURATION: 100 %

## 2020-07-08 PROCEDURE — 7100000000 HC PACU RECOVERY - FIRST 15 MIN: Performed by: UROLOGY

## 2020-07-08 PROCEDURE — 2580000003 HC RX 258: Performed by: NURSE ANESTHETIST, CERTIFIED REGISTERED

## 2020-07-08 PROCEDURE — 2500000003 HC RX 250 WO HCPCS: Performed by: NURSE PRACTITIONER

## 2020-07-08 PROCEDURE — 2709999900 HC NON-CHARGEABLE SUPPLY: Performed by: UROLOGY

## 2020-07-08 PROCEDURE — 2580000003 HC RX 258: Performed by: UROLOGY

## 2020-07-08 PROCEDURE — 7100000011 HC PHASE II RECOVERY - ADDTL 15 MIN: Performed by: UROLOGY

## 2020-07-08 PROCEDURE — 2580000003 HC RX 258: Performed by: NURSE PRACTITIONER

## 2020-07-08 PROCEDURE — 6360000002 HC RX W HCPCS: Performed by: NURSE PRACTITIONER

## 2020-07-08 PROCEDURE — 3700000001 HC ADD 15 MINUTES (ANESTHESIA): Performed by: UROLOGY

## 2020-07-08 PROCEDURE — 2500000003 HC RX 250 WO HCPCS: Performed by: NURSE ANESTHETIST, CERTIFIED REGISTERED

## 2020-07-08 PROCEDURE — 6360000002 HC RX W HCPCS: Performed by: NURSE ANESTHETIST, CERTIFIED REGISTERED

## 2020-07-08 PROCEDURE — 3700000000 HC ANESTHESIA ATTENDED CARE: Performed by: UROLOGY

## 2020-07-08 PROCEDURE — 7100000001 HC PACU RECOVERY - ADDTL 15 MIN: Performed by: UROLOGY

## 2020-07-08 PROCEDURE — 88307 TISSUE EXAM BY PATHOLOGIST: CPT

## 2020-07-08 PROCEDURE — 52240 CYSTOSCOPY AND TREATMENT: CPT | Performed by: UROLOGY

## 2020-07-08 PROCEDURE — 3600000004 HC SURGERY LEVEL 4 BASE: Performed by: UROLOGY

## 2020-07-08 PROCEDURE — 3600000014 HC SURGERY LEVEL 4 ADDTL 15MIN: Performed by: UROLOGY

## 2020-07-08 PROCEDURE — 6370000000 HC RX 637 (ALT 250 FOR IP): Performed by: UROLOGY

## 2020-07-08 PROCEDURE — 7100000010 HC PHASE II RECOVERY - FIRST 15 MIN: Performed by: UROLOGY

## 2020-07-08 RX ORDER — HYDROCODONE BITARTRATE AND ACETAMINOPHEN 5; 325 MG/1; MG/1
2 TABLET ORAL PRN
Status: DISCONTINUED | OUTPATIENT
Start: 2020-07-08 | End: 2020-07-08 | Stop reason: HOSPADM

## 2020-07-08 RX ORDER — SODIUM CHLORIDE 0.9 % (FLUSH) 0.9 %
10 SYRINGE (ML) INJECTION PRN
Status: DISCONTINUED | OUTPATIENT
Start: 2020-07-08 | End: 2020-07-08 | Stop reason: HOSPADM

## 2020-07-08 RX ORDER — LIDOCAINE HYDROCHLORIDE 10 MG/ML
1 INJECTION, SOLUTION EPIDURAL; INFILTRATION; INTRACAUDAL; PERINEURAL
Status: DISCONTINUED | OUTPATIENT
Start: 2020-07-08 | End: 2020-07-08 | Stop reason: HOSPADM

## 2020-07-08 RX ORDER — SODIUM CHLORIDE 0.9 % (FLUSH) 0.9 %
10 SYRINGE (ML) INJECTION EVERY 12 HOURS SCHEDULED
Status: DISCONTINUED | OUTPATIENT
Start: 2020-07-08 | End: 2020-07-08 | Stop reason: HOSPADM

## 2020-07-08 RX ORDER — CEPHALEXIN 500 MG/1
500 CAPSULE ORAL EVERY 12 HOURS
Qty: 14 CAPSULE | Refills: 0 | Status: SHIPPED | OUTPATIENT
Start: 2020-07-08 | End: 2020-09-03

## 2020-07-08 RX ORDER — DIPHENHYDRAMINE HYDROCHLORIDE 50 MG/ML
12.5 INJECTION INTRAMUSCULAR; INTRAVENOUS
Status: DISCONTINUED | OUTPATIENT
Start: 2020-07-08 | End: 2020-07-08 | Stop reason: HOSPADM

## 2020-07-08 RX ORDER — SODIUM CHLORIDE, SODIUM LACTATE, POTASSIUM CHLORIDE, CALCIUM CHLORIDE 600; 310; 30; 20 MG/100ML; MG/100ML; MG/100ML; MG/100ML
INJECTION, SOLUTION INTRAVENOUS CONTINUOUS
Status: DISCONTINUED | OUTPATIENT
Start: 2020-07-08 | End: 2020-07-08 | Stop reason: HOSPADM

## 2020-07-08 RX ORDER — ONDANSETRON 2 MG/ML
INJECTION INTRAMUSCULAR; INTRAVENOUS PRN
Status: DISCONTINUED | OUTPATIENT
Start: 2020-07-08 | End: 2020-07-08 | Stop reason: SDUPTHER

## 2020-07-08 RX ORDER — LIDOCAINE HYDROCHLORIDE 20 MG/ML
JELLY TOPICAL PRN
Status: DISCONTINUED | OUTPATIENT
Start: 2020-07-08 | End: 2020-07-08 | Stop reason: ALTCHOICE

## 2020-07-08 RX ORDER — ROCURONIUM BROMIDE 10 MG/ML
INJECTION, SOLUTION INTRAVENOUS PRN
Status: DISCONTINUED | OUTPATIENT
Start: 2020-07-08 | End: 2020-07-08 | Stop reason: SDUPTHER

## 2020-07-08 RX ORDER — ONDANSETRON 2 MG/ML
4 INJECTION INTRAMUSCULAR; INTRAVENOUS
Status: DISCONTINUED | OUTPATIENT
Start: 2020-07-08 | End: 2020-07-08 | Stop reason: HOSPADM

## 2020-07-08 RX ORDER — CHLORHEXIDINE GLUCONATE 4 G/100ML
SOLUTION TOPICAL PRN
Status: DISCONTINUED | OUTPATIENT
Start: 2020-07-08 | End: 2020-07-08 | Stop reason: ALTCHOICE

## 2020-07-08 RX ORDER — PROPOFOL 10 MG/ML
INJECTION, EMULSION INTRAVENOUS PRN
Status: DISCONTINUED | OUTPATIENT
Start: 2020-07-08 | End: 2020-07-08 | Stop reason: SDUPTHER

## 2020-07-08 RX ORDER — FENTANYL CITRATE 50 UG/ML
50 INJECTION, SOLUTION INTRAMUSCULAR; INTRAVENOUS EVERY 10 MIN PRN
Status: DISCONTINUED | OUTPATIENT
Start: 2020-07-08 | End: 2020-07-08 | Stop reason: HOSPADM

## 2020-07-08 RX ORDER — LIDOCAINE HYDROCHLORIDE 20 MG/ML
INJECTION, SOLUTION INFILTRATION; PERINEURAL PRN
Status: DISCONTINUED | OUTPATIENT
Start: 2020-07-08 | End: 2020-07-08 | Stop reason: SDUPTHER

## 2020-07-08 RX ORDER — FENTANYL CITRATE 50 UG/ML
INJECTION, SOLUTION INTRAMUSCULAR; INTRAVENOUS PRN
Status: DISCONTINUED | OUTPATIENT
Start: 2020-07-08 | End: 2020-07-08 | Stop reason: SDUPTHER

## 2020-07-08 RX ORDER — METOCLOPRAMIDE HYDROCHLORIDE 5 MG/ML
10 INJECTION INTRAMUSCULAR; INTRAVENOUS
Status: DISCONTINUED | OUTPATIENT
Start: 2020-07-08 | End: 2020-07-08 | Stop reason: HOSPADM

## 2020-07-08 RX ORDER — LIDOCAINE HYDROCHLORIDE 10 MG/ML
1 INJECTION, SOLUTION EPIDURAL; INFILTRATION; INTRACAUDAL; PERINEURAL
Status: COMPLETED | OUTPATIENT
Start: 2020-07-08 | End: 2020-07-08

## 2020-07-08 RX ORDER — SODIUM CHLORIDE, SODIUM LACTATE, POTASSIUM CHLORIDE, CALCIUM CHLORIDE 600; 310; 30; 20 MG/100ML; MG/100ML; MG/100ML; MG/100ML
INJECTION, SOLUTION INTRAVENOUS CONTINUOUS PRN
Status: DISCONTINUED | OUTPATIENT
Start: 2020-07-08 | End: 2020-07-08 | Stop reason: SDUPTHER

## 2020-07-08 RX ORDER — MIDAZOLAM HYDROCHLORIDE 1 MG/ML
INJECTION INTRAMUSCULAR; INTRAVENOUS PRN
Status: DISCONTINUED | OUTPATIENT
Start: 2020-07-08 | End: 2020-07-08 | Stop reason: SDUPTHER

## 2020-07-08 RX ORDER — MAGNESIUM HYDROXIDE 1200 MG/15ML
LIQUID ORAL PRN
Status: DISCONTINUED | OUTPATIENT
Start: 2020-07-08 | End: 2020-07-08 | Stop reason: ALTCHOICE

## 2020-07-08 RX ORDER — MEPERIDINE HYDROCHLORIDE 25 MG/ML
12.5 INJECTION INTRAMUSCULAR; INTRAVENOUS; SUBCUTANEOUS EVERY 5 MIN PRN
Status: DISCONTINUED | OUTPATIENT
Start: 2020-07-08 | End: 2020-07-08 | Stop reason: HOSPADM

## 2020-07-08 RX ORDER — GLYCOPYRROLATE 1 MG/5 ML
SYRINGE (ML) INTRAVENOUS PRN
Status: DISCONTINUED | OUTPATIENT
Start: 2020-07-08 | End: 2020-07-08 | Stop reason: SDUPTHER

## 2020-07-08 RX ORDER — HYDROCODONE BITARTRATE AND ACETAMINOPHEN 5; 325 MG/1; MG/1
1 TABLET ORAL PRN
Status: DISCONTINUED | OUTPATIENT
Start: 2020-07-08 | End: 2020-07-08 | Stop reason: HOSPADM

## 2020-07-08 RX ADMIN — MIDAZOLAM HYDROCHLORIDE 2 MG: 2 INJECTION, SOLUTION INTRAMUSCULAR; INTRAVENOUS at 09:42

## 2020-07-08 RX ADMIN — ROCURONIUM BROMIDE 30 MG: 10 INJECTION INTRAVENOUS at 09:49

## 2020-07-08 RX ADMIN — SODIUM CHLORIDE, POTASSIUM CHLORIDE, SODIUM LACTATE AND CALCIUM CHLORIDE 125 ML/HR: 600; 310; 30; 20 INJECTION, SOLUTION INTRAVENOUS at 08:35

## 2020-07-08 RX ADMIN — PROPOFOL 100 MG: 10 INJECTION, EMULSION INTRAVENOUS at 09:49

## 2020-07-08 RX ADMIN — SODIUM CHLORIDE, POTASSIUM CHLORIDE, SODIUM LACTATE AND CALCIUM CHLORIDE: 600; 310; 30; 20 INJECTION, SOLUTION INTRAVENOUS at 09:26

## 2020-07-08 RX ADMIN — ONDANSETRON 4 MG: 2 INJECTION INTRAMUSCULAR; INTRAVENOUS at 09:53

## 2020-07-08 RX ADMIN — FENTANYL CITRATE 100 MCG: 50 INJECTION, SOLUTION INTRAMUSCULAR; INTRAVENOUS at 09:49

## 2020-07-08 RX ADMIN — LIDOCAINE HYDROCHLORIDE 0.1 ML: 10 INJECTION, SOLUTION EPIDURAL; INFILTRATION; INTRACAUDAL; PERINEURAL at 08:34

## 2020-07-08 RX ADMIN — GENTAMICIN SULFATE 120 MG: 40 INJECTION, SOLUTION INTRAMUSCULAR; INTRAVENOUS at 09:44

## 2020-07-08 RX ADMIN — Medication 0.2 MG: at 10:09

## 2020-07-08 RX ADMIN — LIDOCAINE HYDROCHLORIDE 100 MG: 20 INJECTION, SOLUTION INFILTRATION; PERINEURAL at 09:49

## 2020-07-08 ASSESSMENT — PULMONARY FUNCTION TESTS
PIF_VALUE: 18
PIF_VALUE: 2
PIF_VALUE: 17
PIF_VALUE: 1
PIF_VALUE: 0
PIF_VALUE: 18
PIF_VALUE: 2
PIF_VALUE: 27
PIF_VALUE: 15
PIF_VALUE: 31
PIF_VALUE: 17
PIF_VALUE: 17
PIF_VALUE: 15
PIF_VALUE: 0
PIF_VALUE: 18
PIF_VALUE: 16
PIF_VALUE: 0
PIF_VALUE: 2
PIF_VALUE: 18
PIF_VALUE: 17
PIF_VALUE: 18
PIF_VALUE: 17
PIF_VALUE: 0
PIF_VALUE: 18
PIF_VALUE: 18
PIF_VALUE: 17
PIF_VALUE: 18
PIF_VALUE: 18
PIF_VALUE: 16
PIF_VALUE: 0
PIF_VALUE: 0
PIF_VALUE: 16
PIF_VALUE: 2
PIF_VALUE: 18
PIF_VALUE: 3
PIF_VALUE: 18
PIF_VALUE: 15
PIF_VALUE: 20
PIF_VALUE: 18
PIF_VALUE: 15
PIF_VALUE: 2
PIF_VALUE: 17
PIF_VALUE: 16
PIF_VALUE: 16

## 2020-07-08 ASSESSMENT — PAIN - FUNCTIONAL ASSESSMENT: PAIN_FUNCTIONAL_ASSESSMENT: 0-10

## 2020-07-08 ASSESSMENT — LIFESTYLE VARIABLES: SMOKING_STATUS: 1

## 2020-07-08 NOTE — ANESTHESIA PRE PROCEDURE
Department of Anesthesiology  Preprocedure Note       Name:  Domi Silveira   Age:  82 y.o.  :  1935                                          MRN:  [id number]         Date:  2020      Surgeon: Uli Saxena):  Ronnie Wright MD    Procedure: Procedure(s):  CYSTOSCOPY TURBT RESECTOSCOPE, BILATERAL RETROGRADE PYELOGRAM , G. E.T MUSCLE RELAXATION  TO BE LATEX FREE    Medications prior to admission:   Prior to Admission medications    Medication Sig Start Date End Date Taking? Authorizing Provider   triamterene-hydrochlorothiazide (MAXZIDE-25) 37.5-25 MG per tablet Take 1 tablet by mouth daily. Yes Historical Provider, MD   Multiple Vitamins-Minerals (EYE VITAMINS & MINERALS PO) Take by mouth 2 times daily    Historical Provider, MD   traMADol (ULTRAM) 50 MG tablet daily. 6/10/20   Historical Provider, MD   Mastectomy Bra MISC QTY; 4  Diagnosis; c50.911 16   Israel Laguerre MD   Probiotic Product ([address]) Take by mouth every evening     Historical Provider, MD   Omega-3 Fatty Acids (FISH OIL) 1000 MG CAPS Take 3,000 mg by mouth daily. Historical Provider, MD   B Complex Vitamins (B COMPLEX 1 PO) Take  by mouth. Historical Provider, MD   Cholecalciferol (VITAMIN D PO) Take  by mouth 2 times daily. Historical Provider, MD   Loratadine (CLARITIN PO) Take by mouth daily     Historical Provider, MD   Docusate Calcium (STOOL SOFTENER PO) Take  by mouth as needed.       Historical Provider, MD       Current medications:    Current Facility-Administered Medications   Medication Dose Route Frequency Provider Last Rate Last Dose    lactated ringers infusion   Intravenous Continuous Margaree Jeans, APRN -  mL/hr at 20 0835 125 mL/hr at 20 0835    sodium chloride flush 0.9 % injection 10 mL  10 mL Intravenous 2 times per day Margaree Jeans, APRN - CNP        sodium chloride flush 0.9 % injection 10 mL  10 mL Intravenous PRN Margaree Jeans, APRN - CNP        gentamicin (GARAMYCIN) 120 mg in dextrose 5 % 100 mL IVPB  120 mg Intravenous Once Chris Pair, APRN - CNP           Allergies: Allergies   Allergen Reactions    Bacitracin Swelling    Statins      Abdominal pain       Problem List:    Patient Active Problem List   Diagnosis Code    Cancer ([facility] [address] C80.1    Cecal cancer ([facility] [address] C18.0    Seasonal allergies J30.2    Blood in stool K92.1    Gastric ulcer without hemorrhage, perforation, or obstruction K25.9    Personal history of colon cancer Z85.038    Diverticulosis of large intestine without diverticulitis K57.30    Bladder tumor D49.4    Vitamin D deficiency E55.9    Tobacco use disorder F17.200       Past Medical History:        Diagnosis Date    Allergic rhinitis     Arthritis     Breast cancer ([facility] [address]     RT BREAST--dx 1986--oral chemo    Cecal cancer ([facility] [address]     dx 2002--chemo    Diverticulitis     HTN (hypertension)     meds > 20 yrs    Hypercholesterolemia     past trx with meds -- off meds > 5 yrs    Seasonal allergies     Small bowel obstruction ([facility] [address]        Past Surgical History:        Procedure Laterality Date    APPENDECTOMY      age 37s   [address]    mastectomy   [address] [facility] COLONOSCOPY  4/09 & 1/11    COLONOSCOPY  1/9/13,1/10/11          COLONOSCOPY  1/21/15    polypectomy     ENDOSCOPY, COLON, DIAGNOSTIC      EYE SURGERY      Phaco with IOL OU    EYE SURGERY      Lasix OU    HERNIA REPAIR Right 12/11    ING. HERNIA REPAIN W/ MESH    MASTECTOMY, MODIFIED RADICAL Right 1987    OTHER SURGICAL HISTORY      EXC.  BACK SKIN CYST    OTHER SURGICAL HISTORY  12/15/2009    PORT REMOVAL    ID COLONOSCOPY FLX DX W/COLLJ SPEC WHEN PFRMD N/A 3/30/2018    COLONOSCOPY performed by Keri Calvin MD at [address] ESOPHAGOGASTRODUODENOSCOPY TRANSORAL DIAGNOSTIC N/A 3/30/2018    EGD ESOPHAGOGASTRODUODENOSCOPY performed by Malcolm Amador MD at [facility]  04/28/09    CECAL CANCER    TUNNELED VENOUS PORT PLACEMENT  06/11/09    REMOVAL 12/09       Social History:    Social History     Tobacco Use    Smoking status: Current Every Day Smoker     Packs/day: 0.25     Years: 50.00     Pack years: 12.50     Types: Cigarettes    Smokeless tobacco: Never Used    Tobacco comment: 1/2 pack per week--past 1 ppd   Substance Use Topics    Alcohol use:  No                                Ready to quit: Not Answered  Counseling given: Not Answered  Comment: 1/2 pack per week--past 1 ppd      Vital Signs (Current):   Vitals:    07/08/20 0810   BP: (!) 142/65   Pulse: 79   Resp: 18   Temp: 97.7 °F (36.5 °C)   TempSrc: Temporal   SpO2: 94%                                              BP Readings from Last 3 Encounters:   07/08/20 (!) 142/65   06/30/20 (!) 159/64   06/22/20 132/80       NPO Status: Time of last liquid consumption: 2000                        Time of last solid consumption: 2000                        Date of last liquid consumption: 07/07/20                        Date of last solid food consumption: 07/07/20    BMI:   Wt Readings from Last 3 Encounters:   06/30/20 155 lb 3.2 oz (70.4 kg)   06/22/20 155 lb (70.3 kg)   06/02/20 157 lb (71.2 kg)     There is no height or weight on file to calculate BMI.    CBC:   Lab Results   Component Value Date    WBC 4.6 06/30/2020    RBC 4.77 06/30/2020    RBC 3.73 01/01/2012    HGB 15.3 06/30/2020    HCT 45.3 06/30/2020    MCV 94.9 06/30/2020    RDW 13.6 06/30/2020     06/30/2020       CMP:   Lab Results   Component Value Date     06/30/2020    K 4.4 06/30/2020     06/30/2020    CO2 33 06/30/2020    BUN 12 06/30/2020    CREATININE 0.77 06/30/2020    GFRAA >60.0 06/30/2020    LABGLOM >60.0 06/30/2020    GLUCOSE 86 06/30/2020    GLUCOSE 138 01/01/2012    PROT 7.7 06/18/2020    CALCIUM 10.4 06/30/2020    BILITOT 0.6 06/18/2020    ALKPHOS 91 06/18/2020    AST 23 06/18/2020    ALT 15 06/18/2020       POC Tests: No results for input(s): POCGLU, POCNA, POCK, POCCL, POCBUN, POCHEMO, POCHCT in the last 72 hours. Coags:   Lab Results   Component Value Date    PROTIME 10.1 11/10/2013    INR 1.0 11/10/2013    APTT 32.8 11/10/2013       HCG (If Applicable): No results found for: PREGTESTUR, PREGSERUM, HCG, HCGQUANT     ABGs: No results found for: PHART, PO2ART, RGX7LUS, TYZ8AZD, BEART, U4WOXIVD     Type & Screen (If Applicable):  No results found for: LABABO, LABRH    Drug/Infectious Status (If Applicable):  No results found for: HIV, HEPCAB    COVID-19 Screening (If Applicable):   Lab Results   Component Value Date    COVID19 Not Detected 06/30/2020         Anesthesia Evaluation  Patient summary reviewed and Nursing notes reviewed no history of anesthetic complications:   Airway: Mallampati: II  TM distance: >3 FB   Neck ROM: full  Mouth opening: > = 3 FB Dental: normal exam         Pulmonary:normal exam  breath sounds clear to auscultation  (+) current smoker          Patient did not smoke on day of surgery. Cardiovascular:Negative CV ROS  Exercise tolerance: good (>4 METS),   (+) hypertension:,       ECG reviewed  Rhythm: regular  Rate: normal           Beta Blocker:  Not on Beta Blocker         Neuro/Psych:   Negative Neuro/Psych ROS              GI/Hepatic/Renal:   (+) PUD,           Endo/Other:    (+) malignancy/cancer. Pt had PAT visit. Abdominal:           Vascular: negative vascular ROS. Anesthesia Plan      general     ASA 3     (ETT)  Induction: intravenous. MIPS: Postoperative opioids intended and Prophylactic antiemetics administered. Anesthetic plan and risks discussed with patient. Plan discussed with CRNA.     Attending anesthesiologist reviewed and agrees with Flo Otero DO   7/8/2020

## 2020-07-08 NOTE — ANESTHESIA POSTPROCEDURE EVALUATION
Department of Anesthesiology  Postprocedure Note    Patient: Willy Gomez  MRN: [id number]  YOB: 1935  Date of evaluation: 7/8/2020  Time:  10:36 AM     Procedure Summary     Date:  07/08/20 Room / Location:  [facility]    Anesthesia Start:  1602 Anesthesia Stop:  7101    Procedure:  CYSTOSCOPY TURBT RESECTOSCOPE, BILATERAL RETROGRADE PYELOGRAM (N/A ) Diagnosis:  (BLADDER TUMOR)    Surgeon:  Zeenat Leal MD Responsible Provider:  SUSY Stewart CRNA    Anesthesia Type:  general ASA Status:  3          Anesthesia Type: general    Dorian Phase I: Dorian Score: 10    Dorian Phase II:      Last vitals: Reviewed and per EMR flowsheets.        Anesthesia Post Evaluation    Patient location during evaluation: bedside  Patient participation: complete - patient participated  Level of consciousness: awake  Nausea & Vomiting: no nausea and no vomiting  Complications: no  Cardiovascular status: blood pressure returned to baseline  Respiratory status: acceptable  Hydration status: euvolemic

## 2020-07-08 NOTE — PROGRESS NOTES
Dr. Cha Manuel at bedside to assess pt and update. Instructed pt that she will go home with sunshine catheter and informed  that they will take it out in the office this Monday July 13.

## 2020-07-08 NOTE — PROGRESS NOTES
Discharge teaching provided to patient regarding sunshine bag and leg bag. Patient opted to have sunshine bag to go home with - placed sunshine bag on catheter. Educated patient to keep sunshine bag below bladder. Leg-bag placed in a bag for patient to take home. Leg strap attached. Gave patient alcohol swabs for hubs of catheter/bags. Patient verbalized understanding with how to change bags if needed and how to empty each bag. Gave patient an I&O sheet to document output and instructional handout on catheter care. Assisted patient with getting dressed and educated patient how to put pants on with catheter. Patient left with instructions in hand, supplies and cane. Discharged via wheelchair to home with [facility].

## 2020-07-09 NOTE — OP NOTE
Dasia Merino [address] [place] [address], [address] [facility]                                OPERATIVE REPORT    PATIENT NAME: Domo Nash                   :        1935  MED REC NO:   [id number]                            ROOM:  ACCOUNT NO:   [de-identified]                           ADMIT DATE: 2020  PROVIDER:     Melly Zuñiga MD    DATE OF PROCEDURE:  2020    PREOPERATIVE DIAGNOSIS:  Solitary sessile mass posterior bladder wall  greater than 5 cm in size. POSTOPERATIVE DIAGNOSIS:  Solitary sessile mass posterior bladder wall  greater than 5 cm in size. OPERATION:  Transurethral resection of bladder tumor. SURGEON:  Melly Zuñiga MD    ANESTHESIA:  General.    ESTIMATED BLOOD LOSS:  50 mL. INDICATIONS:  The patient is an 82-year-old female who presented to the  office with gross hematuria. CT of the abdomen and pelvis with and  without contrast performed showed no evidence of upper tract lesions. However, the patient did have a filling defect in the bladder consistent  with polypoid lesion. Cystoscopy in the office confirmed this to be a  posterior bladder wall lesion approximately 5 cm2 involving the tumor  and surrounding tissue with papillary lesions, satellite lesions in  general.  The patient will undergo resection with possible bilateral  retrograde pyelogram if needed during the procedure. OPERATIVE PROCEDURE:  The patient received preoperative IV antibiotics. She was taken to the operating room, placed on the operating table in  the dorsal lithotomy position. She was placed under general anesthetic. She was prepped and draped in sterile fashion. Then a 20-Tajik  cystoscope was used to perform a cystoscopy. Cystoscopic evaluation  once again showed the posterior bladder wall tumor. This was not a  papillary lesion and was sessile in nature.   There  were multiple satellite lesions around the bladder, papillary in nature  not sessile. At this point, the ureters were inspected and they were  not  near the tumor. Therefore, it was decided not to do  retrograde pyelograms and/or placement of double-J stents. Then, a  resectoscope was used to perform a routine resection of the tumor. Deep  muscularis mucosal layers were removed. The patient has minimal  trabeculation. Therefore, the deep biopsies went through the wall of  the muscularis mucosa for an excellent biopsy. However, the patient  will need to go home with a Mares catheter for decompression for  additional 4 days prior to removal of Mares. Satellite lesions in and  around the sessile mass were then fulgurated for area greater than 5  cm2. At the end of the procedure, there was no bleeding. There was no  evidence of perforation. Mares catheter was inserted and the patient  was discharged to the recovery room in stable condition. Urine was  clear throughout the postoperative stay. She was sent home with  antibiotics.   She will return to the office on 07/13/2020 for Mares  catheter removal.        Alba Peck MD    D: 07/08/2020 13:16:30       T: 07/08/2020 13:23:30     [email or web address]  Job#: [id number]     Doc#: [id number]    CC:

## 2020-07-10 ENCOUNTER — TELEPHONE (OUTPATIENT)
Dept: SURGERY | Age: 85
End: 2020-07-10

## 2020-07-13 ENCOUNTER — OFFICE VISIT (OUTPATIENT)
Dept: UROLOGY | Age: 85
End: 2020-07-13

## 2020-07-13 VITALS
DIASTOLIC BLOOD PRESSURE: 72 MMHG | HEART RATE: 77 BPM | HEIGHT: 65 IN | BODY MASS INDEX: 25.83 KG/M2 | WEIGHT: 155 LBS | SYSTOLIC BLOOD PRESSURE: 128 MMHG

## 2020-07-13 PROCEDURE — 99024 POSTOP FOLLOW-UP VISIT: CPT | Performed by: UROLOGY

## 2020-07-13 NOTE — PROGRESS NOTES
Urology  Postop check after TURBT  Pathology pending  Mares removed  Area will be rebiopsied in 6 weeks  Preop PAT sam Beltran MD

## 2020-09-03 ENCOUNTER — NURSE ONLY (OUTPATIENT)
Dept: PRIMARY CARE CLINIC | Age: 85
End: 2020-09-03

## 2020-09-03 ENCOUNTER — HOSPITAL ENCOUNTER (OUTPATIENT)
Dept: PREADMISSION TESTING | Age: 85
Discharge: HOME | End: 2020-09-07
Payer: MEDICARE

## 2020-09-03 VITALS
BODY MASS INDEX: 27.36 KG/M2 | TEMPERATURE: 99.3 F | SYSTOLIC BLOOD PRESSURE: 149 MMHG | DIASTOLIC BLOOD PRESSURE: 73 MMHG | WEIGHT: 154.4 LBS | HEART RATE: 80 BPM | RESPIRATION RATE: 20 BRPM | HEIGHT: 63 IN | OXYGEN SATURATION: 99 %

## 2020-09-03 PROBLEM — C67.9 BLADDER CANCER (HCC): Status: ACTIVE | Noted: 2020-09-03

## 2020-09-03 LAB
ABO/RH: NORMAL
ANION GAP SERPL CALCULATED.3IONS-SCNC: 12 MEQ/L (ref 9–15)
ANTIBODY SCREEN: NORMAL
BUN BLDV-MCNC: 12 MG/DL (ref 8–23)
CALCIUM SERPL-MCNC: 9.9 MG/DL (ref 8.5–9.9)
CHLORIDE BLD-SCNC: 99 MEQ/L (ref 95–107)
CO2: 30 MEQ/L (ref 20–31)
CREAT SERPL-MCNC: 0.72 MG/DL (ref 0.5–0.9)
GFR AFRICAN AMERICAN: >60
GFR NON-AFRICAN AMERICAN: >60
GLUCOSE BLD-MCNC: 106 MG/DL (ref 70–99)
HCT VFR BLD CALC: 48.1 % (ref 37–47)
HEMOGLOBIN: 16.3 G/DL (ref 12–16)
MCH RBC QN AUTO: 32 PG (ref 27–31.3)
MCHC RBC AUTO-ENTMCNC: 33.8 % (ref 33–37)
MCV RBC AUTO: 94.6 FL (ref 82–100)
PDW BLD-RTO: 13.5 % (ref 11.5–14.5)
PLATELET # BLD: 229 K/UL (ref 130–400)
POTASSIUM SERPL-SCNC: 3.5 MEQ/L (ref 3.4–4.9)
RBC # BLD: 5.09 M/UL (ref 4.2–5.4)
SODIUM BLD-SCNC: 141 MEQ/L (ref 135–144)
WBC # BLD: 5.5 K/UL (ref 4.8–10.8)

## 2020-09-03 PROCEDURE — 86900 BLOOD TYPING SEROLOGIC ABO: CPT

## 2020-09-03 PROCEDURE — 85027 COMPLETE CBC AUTOMATED: CPT

## 2020-09-03 PROCEDURE — 86901 BLOOD TYPING SEROLOGIC RH(D): CPT

## 2020-09-03 PROCEDURE — 86850 RBC ANTIBODY SCREEN: CPT

## 2020-09-03 PROCEDURE — U0003 INFECTIOUS AGENT DETECTION BY NUCLEIC ACID (DNA OR RNA); SEVERE ACUTE RESPIRATORY SYNDROME CORONAVIRUS 2 (SARS-COV-2) (CORONAVIRUS DISEASE [COVID-19]), AMPLIFIED PROBE TECHNIQUE, MAKING USE OF HIGH THROUGHPUT TECHNOLOGIES AS DESCRIBED BY CMS-2020-01-R: HCPCS

## 2020-09-03 PROCEDURE — 80048 BASIC METABOLIC PNL TOTAL CA: CPT

## 2020-09-03 RX ORDER — LIDOCAINE HYDROCHLORIDE 10 MG/ML
1 INJECTION, SOLUTION EPIDURAL; INFILTRATION; INTRACAUDAL; PERINEURAL
Status: CANCELLED | OUTPATIENT
Start: 2020-09-09 | End: 2020-09-09

## 2020-09-03 RX ORDER — AMOXICILLIN 500 MG
CAPSULE ORAL DAILY
COMMUNITY

## 2020-09-03 RX ORDER — SODIUM CHLORIDE 0.9 % (FLUSH) 0.9 %
10 SYRINGE (ML) INJECTION EVERY 12 HOURS SCHEDULED
Status: CANCELLED | OUTPATIENT
Start: 2020-09-09

## 2020-09-03 RX ORDER — VITAMIN B COMPLEX
1 CAPSULE ORAL DAILY
COMMUNITY

## 2020-09-03 RX ORDER — SODIUM CHLORIDE, SODIUM LACTATE, POTASSIUM CHLORIDE, CALCIUM CHLORIDE 600; 310; 30; 20 MG/100ML; MG/100ML; MG/100ML; MG/100ML
INJECTION, SOLUTION INTRAVENOUS CONTINUOUS
Status: CANCELLED | OUTPATIENT
Start: 2020-09-09

## 2020-09-03 RX ORDER — POLYETHYLENE GLYCOL 3350 17 G/17G
17 POWDER, FOR SOLUTION ORAL DAILY
COMMUNITY

## 2020-09-03 RX ORDER — SODIUM CHLORIDE 0.9 % (FLUSH) 0.9 %
10 SYRINGE (ML) INJECTION PRN
Status: CANCELLED | OUTPATIENT
Start: 2020-09-09

## 2020-09-03 RX ORDER — ACETAMINOPHEN 160 MG
TABLET,DISINTEGRATING ORAL DAILY
COMMUNITY

## 2020-09-03 ASSESSMENT — ENCOUNTER SYMPTOMS
STRIDOR: 0
BACK PAIN: 1
DIARRHEA: 0
CONSTIPATION: 0
NAUSEA: 0
SORE THROAT: 0
VOMITING: 0
WHEEZING: 0
COUGH: 0
ABDOMINAL PAIN: 0
CHEST TIGHTNESS: 0
SHORTNESS OF BREATH: 0
ALLERGIC/IMMUNOLOGIC NEGATIVE: 1
EYES NEGATIVE: 1
TROUBLE SWALLOWING: 0

## 2020-09-03 NOTE — H&P
Nurse Practitioner History and Physical      CHIEF COMPLAINT:  Bladder surgery    HISTORY OF PRESENT ILLNESS:      The patient is a 82 y.o. female with significant past medical history of bladder cancer who presents for cystoscopy, bladder biopsy, fulguration. S/p TURBT 7/8/2020. Denies any  sx except for chronic urgency which has not changed. Scheduled for OR. Past Medical History:        Diagnosis Date    Allergic rhinitis     Arthritis     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 [address]     dx 2002--chemo    Diverticulitis     HTN (hypertension)     meds > 20 yrs    Hypercholesterolemia     past trx with meds -- off meds > 5 yrs    Seasonal allergies     Small bowel obstruction ([facility] [address]      Past Surgical History:    Past Surgical History:   Procedure Laterality Date    APPENDECTOMY      age 31s    BREAST SURGERY Right 1987    mastectomy   [address] COLONOSCOPY  4/09 & 1/11    COLONOSCOPY  1/9/13,1/10/11          COLONOSCOPY  1/21/15    polypectomy     CYSTOSCOPY N/A 7/8/2020    CYSTOSCOPY TURBT RESECTOSCOPE performed by Nubia Li MD at [facility]. [address], COLON, DIAGNOSTIC      EYE SURGERY      Phaco with IOL OU    EYE SURGERY      Lasix OU    HERNIA REPAIR Right 12/11    ING. HERNIA REPAIN W/ MESH    MASTECTOMY, MODIFIED RADICAL Right 1987    OTHER SURGICAL HISTORY      EXC.  BACK SKIN CYST    OTHER SURGICAL HISTORY  12/15/2009    PORT REMOVAL    MT COLONOSCOPY FLX DX W/COLLJ SPEC WHEN PFRMD N/A 3/30/2018    COLONOSCOPY performed by José Miguel Whatley MD at [facility] ESOPHAGOGASTRODUODENOSCOPY TRANSORAL DIAGNOSTIC N/A 3/30/2018    EGD ESOPHAGOGASTRODUODENOSCOPY performed by José Miguel Whatley MD at [facility]  04/28/09    CECAL CANCER    TUNNELED VENOUS PORT PLACEMENT  06/11/09    REMOVAL 12/09         Medications Prior to Admission: Voodoo service: Not on file     Active member of club or organization: Not on file     Attends meetings of clubs or organizations: Not on file     Relationship status: Not on file    Intimate partner violence     Fear of current or ex partner: Not on file     Emotionally abused: Not on file     Physically abused: Not on file     Forced sexual activity: Not on file   Other Topics Concern    Not on file   Social History Narrative    Not on file       Family History:       Problem Relation Age of Onset    Heart Disease Mother     Dementia Mother     Colon Polyps Mother     Diabetes Father     Obesity Father        Review of Systems   Constitutional: Negative. Negative for chills and fever. HENT: Negative for congestion, sore throat, tinnitus and trouble swallowing. Eyes: Negative. Negative for visual disturbance. Respiratory: Negative for cough, chest tightness, shortness of breath, wheezing and stridor. Cardiovascular: Negative for chest pain and palpitations. Gastrointestinal: Negative for abdominal pain, constipation, diarrhea, nausea and vomiting. Endocrine: Negative. Genitourinary: Negative for dysuria and frequency. Musculoskeletal: Positive for back pain. Negative for myalgias and neck pain. Skin: Negative. Allergic/Immunologic: Negative. Neurological: Negative. Negative for seizures and headaches. Hematological: Negative. Psychiatric/Behavioral: Negative. Vitals:  BP (!) 149/73   Pulse 80   Temp 99.3 °F (37.4 °C) (Temporal)   Resp 20   Ht 5' 3\" (1.6 m)   Wt 154 lb 6.4 oz (70 kg)   LMP 06/30/1980   SpO2 99%   BMI 27.35 kg/m²     Physical Exam  Constitutional:       Appearance: She is well-developed. HENT:      Head: Normocephalic and atraumatic. Right Ear: Tympanic membrane, ear canal and external ear normal.      Left Ear: Tympanic membrane, ear canal and external ear normal.      Nose: No congestion.       Mouth/Throat:      Mouth: Mucous membranes are moist.   Eyes:      General: No scleral icterus. Extraocular Movements: Extraocular movements intact. Conjunctiva/sclera: Conjunctivae normal.      Pupils: Pupils are equal, round, and reactive to light. Comments: IOL OU. Neck:      Musculoskeletal: Normal range of motion. Thyroid: No thyromegaly. Vascular: No JVD. Trachea: No tracheal deviation. Cardiovascular:      Rate and Rhythm: Normal rate and regular rhythm. Heart sounds: Normal heart sounds. No murmur. No gallop. Pulmonary:      Effort: Pulmonary effort is normal. No respiratory distress. Breath sounds: Normal breath sounds. No wheezing or rales. Abdominal:      General: Bowel sounds are normal.      Palpations: Abdomen is soft. Tenderness: There is no abdominal tenderness. Genitourinary:     Comments: Deferred. Musculoskeletal: Normal range of motion. Right lower leg: Edema (1 +) present. Left lower leg: Edema (1 +) present. Lymphadenopathy:      Cervical: No cervical adenopathy. Skin:     General: Skin is warm and dry. Findings: No erythema. Neurological:      Mental Status: She is alert and oriented to person, place, and time. Gait: Gait abnormal (ambulates with cane for stability). Psychiatric:         Mood and Affect: Mood normal.         Behavior: Behavior normal.         Thought Content:  Thought content normal.         Judgment: Judgment normal.         [unfilled]    Assessment:  Patient Active Problem List   Diagnosis    Cancer ([address]    Cecal cancer ([address]    Seasonal allergies    Blood in stool    Gastric ulcer without hemorrhage, perforation, or obstruction    Personal history of colon cancer    Diverticulosis of large intestine without diverticulitis    Bladder tumor    Vitamin D deficiency    Tobacco use disorder    Malignant neoplasm of posterior wall of urinary bladder ([address]    Bladder cancer ([address]         Plan:  Scheduled for cystoscopy, bladder biopsy, fulguration.     SUSY Max - CNP  9/3/2020  11:56 AM

## 2020-09-03 NOTE — PROGRESS NOTES
EKG dated 6/30/2020 & on Epic. Covid test to be done 9/3/2020 & will self quarantine until OR 9/9/2020.

## 2020-09-08 ENCOUNTER — ANESTHESIA EVENT (OUTPATIENT)
Dept: OPERATING ROOM | Age: 85
End: 2020-09-08
Payer: MEDICARE

## 2020-09-08 ASSESSMENT — LIFESTYLE VARIABLES: SMOKING_STATUS: 1

## 2020-09-09 ENCOUNTER — ANESTHESIA (OUTPATIENT)
Dept: OPERATING ROOM | Age: 85
End: 2020-09-09
Payer: MEDICARE

## 2020-09-09 ENCOUNTER — HOSPITAL ENCOUNTER (OUTPATIENT)
Age: 85
Setting detail: OUTPATIENT SURGERY
Discharge: HOME | End: 2020-09-09
Attending: UROLOGY | Admitting: UROLOGY
Payer: MEDICARE

## 2020-09-09 VITALS
SYSTOLIC BLOOD PRESSURE: 148 MMHG | RESPIRATION RATE: 16 BRPM | TEMPERATURE: 97.3 F | HEIGHT: 63 IN | OXYGEN SATURATION: 98 % | HEART RATE: 60 BPM | BODY MASS INDEX: 27.29 KG/M2 | WEIGHT: 154 LBS | DIASTOLIC BLOOD PRESSURE: 65 MMHG

## 2020-09-09 VITALS — SYSTOLIC BLOOD PRESSURE: 103 MMHG | DIASTOLIC BLOOD PRESSURE: 51 MMHG | OXYGEN SATURATION: 100 %

## 2020-09-09 LAB
SARS-COV-2: NOT DETECTED
SOURCE: NORMAL

## 2020-09-09 PROCEDURE — 7100000001 HC PACU RECOVERY - ADDTL 15 MIN: Performed by: UROLOGY

## 2020-09-09 PROCEDURE — 2500000003 HC RX 250 WO HCPCS: Performed by: NURSE ANESTHETIST, CERTIFIED REGISTERED

## 2020-09-09 PROCEDURE — 88305 TISSUE EXAM BY PATHOLOGIST: CPT

## 2020-09-09 PROCEDURE — 6360000002 HC RX W HCPCS: Performed by: ANESTHESIOLOGY

## 2020-09-09 PROCEDURE — 6360000002 HC RX W HCPCS: Performed by: NURSE PRACTITIONER

## 2020-09-09 PROCEDURE — 6370000000 HC RX 637 (ALT 250 FOR IP): Performed by: UROLOGY

## 2020-09-09 PROCEDURE — 7100000010 HC PHASE II RECOVERY - FIRST 15 MIN: Performed by: UROLOGY

## 2020-09-09 PROCEDURE — 88312 SPECIAL STAINS GROUP 1: CPT

## 2020-09-09 PROCEDURE — 2580000003 HC RX 258: Performed by: NURSE PRACTITIONER

## 2020-09-09 PROCEDURE — 52204 CYSTOSCOPY W/BIOPSY(S): CPT | Performed by: UROLOGY

## 2020-09-09 PROCEDURE — 3600000014 HC SURGERY LEVEL 4 ADDTL 15MIN: Performed by: UROLOGY

## 2020-09-09 PROCEDURE — 3700000000 HC ANESTHESIA ATTENDED CARE: Performed by: UROLOGY

## 2020-09-09 PROCEDURE — 3600000004 HC SURGERY LEVEL 4 BASE: Performed by: UROLOGY

## 2020-09-09 PROCEDURE — 6360000002 HC RX W HCPCS: Performed by: NURSE ANESTHETIST, CERTIFIED REGISTERED

## 2020-09-09 PROCEDURE — 2580000003 HC RX 258: Performed by: UROLOGY

## 2020-09-09 PROCEDURE — 3700000001 HC ADD 15 MINUTES (ANESTHESIA): Performed by: UROLOGY

## 2020-09-09 PROCEDURE — 2709999900 HC NON-CHARGEABLE SUPPLY: Performed by: UROLOGY

## 2020-09-09 PROCEDURE — 7100000000 HC PACU RECOVERY - FIRST 15 MIN: Performed by: UROLOGY

## 2020-09-09 RX ORDER — HYDROCODONE BITARTRATE AND ACETAMINOPHEN 5; 325 MG/1; MG/1
2 TABLET ORAL PRN
Status: DISCONTINUED | OUTPATIENT
Start: 2020-09-09 | End: 2020-09-09 | Stop reason: HOSPADM

## 2020-09-09 RX ORDER — MEPERIDINE HYDROCHLORIDE 25 MG/ML
12.5 INJECTION INTRAMUSCULAR; INTRAVENOUS; SUBCUTANEOUS EVERY 5 MIN PRN
Status: DISCONTINUED | OUTPATIENT
Start: 2020-09-09 | End: 2020-09-09 | Stop reason: HOSPADM

## 2020-09-09 RX ORDER — ONDANSETRON 2 MG/ML
4 INJECTION INTRAMUSCULAR; INTRAVENOUS
Status: DISCONTINUED | OUTPATIENT
Start: 2020-09-09 | End: 2020-09-09 | Stop reason: HOSPADM

## 2020-09-09 RX ORDER — LIDOCAINE HYDROCHLORIDE 10 MG/ML
1 INJECTION, SOLUTION EPIDURAL; INFILTRATION; INTRACAUDAL; PERINEURAL
Status: DISCONTINUED | OUTPATIENT
Start: 2020-09-09 | End: 2020-09-09 | Stop reason: HOSPADM

## 2020-09-09 RX ORDER — 0.9 % SODIUM CHLORIDE 0.9 %
500 INTRAVENOUS SOLUTION INTRAVENOUS
Status: DISCONTINUED | OUTPATIENT
Start: 2020-09-09 | End: 2020-09-09 | Stop reason: HOSPADM

## 2020-09-09 RX ORDER — FENTANYL CITRATE 50 UG/ML
25 INJECTION, SOLUTION INTRAMUSCULAR; INTRAVENOUS EVERY 5 MIN PRN
Status: DISCONTINUED | OUTPATIENT
Start: 2020-09-09 | End: 2020-09-09 | Stop reason: HOSPADM

## 2020-09-09 RX ORDER — GLYCOPYRROLATE 1 MG/5 ML
SYRINGE (ML) INTRAVENOUS PRN
Status: DISCONTINUED | OUTPATIENT
Start: 2020-09-09 | End: 2020-09-09 | Stop reason: SDUPTHER

## 2020-09-09 RX ORDER — HYDROCODONE BITARTRATE AND ACETAMINOPHEN 5; 325 MG/1; MG/1
1 TABLET ORAL PRN
Status: DISCONTINUED | OUTPATIENT
Start: 2020-09-09 | End: 2020-09-09 | Stop reason: HOSPADM

## 2020-09-09 RX ORDER — ONDANSETRON 2 MG/ML
INJECTION INTRAMUSCULAR; INTRAVENOUS PRN
Status: DISCONTINUED | OUTPATIENT
Start: 2020-09-09 | End: 2020-09-09 | Stop reason: SDUPTHER

## 2020-09-09 RX ORDER — LIDOCAINE HYDROCHLORIDE 20 MG/ML
INJECTION, SOLUTION INFILTRATION; PERINEURAL PRN
Status: DISCONTINUED | OUTPATIENT
Start: 2020-09-09 | End: 2020-09-09 | Stop reason: SDUPTHER

## 2020-09-09 RX ORDER — MORPHINE SULFATE 2 MG/ML
1 INJECTION, SOLUTION INTRAMUSCULAR; INTRAVENOUS EVERY 5 MIN PRN
Status: DISCONTINUED | OUTPATIENT
Start: 2020-09-09 | End: 2020-09-09 | Stop reason: HOSPADM

## 2020-09-09 RX ORDER — DIPHENHYDRAMINE HYDROCHLORIDE 50 MG/ML
12.5 INJECTION INTRAMUSCULAR; INTRAVENOUS
Status: DISCONTINUED | OUTPATIENT
Start: 2020-09-09 | End: 2020-09-09 | Stop reason: HOSPADM

## 2020-09-09 RX ORDER — LIDOCAINE HYDROCHLORIDE 20 MG/ML
JELLY TOPICAL PRN
Status: DISCONTINUED | OUTPATIENT
Start: 2020-09-09 | End: 2020-09-09 | Stop reason: ALTCHOICE

## 2020-09-09 RX ORDER — LABETALOL HYDROCHLORIDE 5 MG/ML
5 INJECTION, SOLUTION INTRAVENOUS EVERY 10 MIN PRN
Status: DISCONTINUED | OUTPATIENT
Start: 2020-09-09 | End: 2020-09-09 | Stop reason: HOSPADM

## 2020-09-09 RX ORDER — SODIUM CHLORIDE, SODIUM LACTATE, POTASSIUM CHLORIDE, CALCIUM CHLORIDE 600; 310; 30; 20 MG/100ML; MG/100ML; MG/100ML; MG/100ML
INJECTION, SOLUTION INTRAVENOUS CONTINUOUS
Status: DISCONTINUED | OUTPATIENT
Start: 2020-09-09 | End: 2020-09-09 | Stop reason: HOSPADM

## 2020-09-09 RX ORDER — METOCLOPRAMIDE HYDROCHLORIDE 5 MG/ML
10 INJECTION INTRAMUSCULAR; INTRAVENOUS
Status: DISCONTINUED | OUTPATIENT
Start: 2020-09-09 | End: 2020-09-09 | Stop reason: HOSPADM

## 2020-09-09 RX ORDER — SODIUM CHLORIDE 0.9 % (FLUSH) 0.9 %
10 SYRINGE (ML) INJECTION EVERY 12 HOURS SCHEDULED
Status: DISCONTINUED | OUTPATIENT
Start: 2020-09-09 | End: 2020-09-09 | Stop reason: HOSPADM

## 2020-09-09 RX ORDER — FENTANYL CITRATE 50 UG/ML
INJECTION, SOLUTION INTRAMUSCULAR; INTRAVENOUS PRN
Status: DISCONTINUED | OUTPATIENT
Start: 2020-09-09 | End: 2020-09-09 | Stop reason: SDUPTHER

## 2020-09-09 RX ORDER — SODIUM CHLORIDE 0.9 % (FLUSH) 0.9 %
10 SYRINGE (ML) INJECTION PRN
Status: DISCONTINUED | OUTPATIENT
Start: 2020-09-09 | End: 2020-09-09 | Stop reason: HOSPADM

## 2020-09-09 RX ORDER — PROPOFOL 10 MG/ML
INJECTION, EMULSION INTRAVENOUS PRN
Status: DISCONTINUED | OUTPATIENT
Start: 2020-09-09 | End: 2020-09-09 | Stop reason: SDUPTHER

## 2020-09-09 RX ORDER — MAGNESIUM HYDROXIDE 1200 MG/15ML
LIQUID ORAL PRN
Status: DISCONTINUED | OUTPATIENT
Start: 2020-09-09 | End: 2020-09-09 | Stop reason: ALTCHOICE

## 2020-09-09 RX ADMIN — GENTAMICIN SULFATE 120 MG: 40 INJECTION, SOLUTION INTRAMUSCULAR; INTRAVENOUS at 08:14

## 2020-09-09 RX ADMIN — FENTANYL CITRATE 25 MCG: 50 INJECTION, SOLUTION INTRAMUSCULAR; INTRAVENOUS at 08:48

## 2020-09-09 RX ADMIN — FENTANYL CITRATE 25 MCG: 50 INJECTION, SOLUTION INTRAMUSCULAR; INTRAVENOUS at 08:07

## 2020-09-09 RX ADMIN — LIDOCAINE HYDROCHLORIDE 60 MG: 20 INJECTION, SOLUTION INFILTRATION; PERINEURAL at 08:11

## 2020-09-09 RX ADMIN — SODIUM CHLORIDE, POTASSIUM CHLORIDE, SODIUM LACTATE AND CALCIUM CHLORIDE: 600; 310; 30; 20 INJECTION, SOLUTION INTRAVENOUS at 06:47

## 2020-09-09 RX ADMIN — Medication 0.2 MG: at 08:19

## 2020-09-09 RX ADMIN — PROPOFOL 180 MG: 10 INJECTION, EMULSION INTRAVENOUS at 08:11

## 2020-09-09 RX ADMIN — ONDANSETRON 4 MG: 2 INJECTION INTRAMUSCULAR; INTRAVENOUS at 08:17

## 2020-09-09 ASSESSMENT — PAIN SCALES - GENERAL
PAINLEVEL_OUTOF10: 6
PAINLEVEL_OUTOF10: 8
PAINLEVEL_OUTOF10: 8
PAINLEVEL_OUTOF10: 4
PAINLEVEL_OUTOF10: 4
PAINLEVEL_OUTOF10: 8
PAINLEVEL_OUTOF10: 4

## 2020-09-09 ASSESSMENT — PULMONARY FUNCTION TESTS
PIF_VALUE: 11
PIF_VALUE: 0
PIF_VALUE: 2
PIF_VALUE: 11
PIF_VALUE: 2
PIF_VALUE: 11
PIF_VALUE: 2
PIF_VALUE: 11
PIF_VALUE: 10
PIF_VALUE: 2
PIF_VALUE: 0
PIF_VALUE: 11
PIF_VALUE: 10
PIF_VALUE: 10
PIF_VALUE: 1
PIF_VALUE: 11
PIF_VALUE: 11
PIF_VALUE: 1
PIF_VALUE: 11
PIF_VALUE: 1
PIF_VALUE: 1
PIF_VALUE: 3
PIF_VALUE: 2

## 2020-09-09 ASSESSMENT — PAIN DESCRIPTION - LOCATION
LOCATION: BACK

## 2020-09-09 ASSESSMENT — PAIN DESCRIPTION - PAIN TYPE
TYPE: CHRONIC PAIN

## 2020-09-09 NOTE — ANESTHESIA POSTPROCEDURE EVALUATION
Department of Anesthesiology  Postprocedure Note    Patient: Tatyana Obrien  MRN: [id number]  YOB: 1935  Date of evaluation: 9/9/2020  Time:  8:41 AM     Procedure Summary     Date:  09/09/20 Room / Location:  [facility]    Anesthesia Start:  4118 Anesthesia Stop:      Procedure:  CYSTOSCOPY BLADDER BIOPSY FULGURATION (N/A ) Diagnosis:  (BLADDER CANCER)    Surgeon:  Ty Blackwood MD Responsible Provider:  SUSY Cooper CRNA    Anesthesia Type:  general ASA Status:  3          Anesthesia Type: general    Dorian Phase I:      Dorian Phase II:      Last vitals: Reviewed and per EMR flowsheets.        Anesthesia Post Evaluation    Patient location during evaluation: PACU  Patient participation: complete - patient participated  Level of consciousness: awake and awake and alert  Pain score: 0  Airway patency: patent  Nausea & Vomiting: no nausea and no vomiting  Complications: no  Cardiovascular status: blood pressure returned to baseline and hemodynamically stable  Respiratory status: acceptable, spontaneous ventilation and face mask  Hydration status: euvolemic

## 2020-09-09 NOTE — OP NOTE
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 OPERATION:  Cystoscopy, biopsy of bladder with fulguration. SURGEON:  Lynette Singh MD    ANESTHESIA:  General  ebl 0    INDICATIONS:  The patient is an 29-year-old female, who underwent  initial resection of a papillary lesion in the bladder on 2020. At that time, the biopsy showed high-grade papillary urothelial  carcinoma invasive into the lamina propria. There was no muscularis  propria identified. The patient will undergo additional biopsy in the  area to see if there is any residual tumor. OPERATIVE NOTE:  The patient received preoperative IV antibiotics. She  was taken to operating room and placed on the operating table in dorsal  lithotomy position. The patient was then placed under an anesthetic. She received preoperative IV antibiotics. Cystoscopy was performed. Once again, the area of scar was noted. Examination of the rest of the  bladder showed no other new lesions. A cold cup biopsy forceps was used  to perform some deep biopsies in and around this area. The area was  then fulgurated. The patient was discharged to recovery room in stable  condition. The patient tolerated the procedure well. There were no  complications.         Parker Navarro MD    D: 2020 11:52:37       T: 2020 12:01:59     [email or web address]  Job#: [id number]     Doc#: [id number]    CC:

## 2020-09-09 NOTE — ANESTHESIA PRE PROCEDURE
Department of Anesthesiology  Preprocedure Note       Name:  Chrsitiano Riley   Age:  82 y.o.  :  1935                                          MRN:  [id number]         Date:  2020      Surgeon: Neena Warner):  Ana Sheikh MD    Procedure: Procedure(s):  CYSTOSCOPY    Medications prior to admission:   Prior to Admission medications    Medication Sig Start Date End Date Taking? Authorizing Provider   polyethylene glycol (GLYCOLAX) 17 GM/SCOOP powder Take 17 g by mouth daily    Historical Provider, MD   b complex vitamins capsule Take 1 capsule by mouth daily    Historical Provider, MD   Cholecalciferol (VITAMIN D3) 50 MCG (2000) CAPS Take by mouth daily    Historical Provider, MD   Multiple Vitamins-Minerals (EYE VITAMINS & MINERALS PO) Take by mouth daily    Historical Provider, MD   Omega-3 Fatty Acids (FISH OIL) 1200 MG CAPS Take by mouth daily    Historical Provider, MD   traMADol (ULTRAM) 50 MG tablet daily. 6/10/20   Historical Provider, MD   Mastectomy Bra MISC QTY; 4  Diagnosis; c50.911 16   Chantell Genao MD   Probiotic Product ([address]) Take by mouth every evening     Historical Provider, MD   Loratadine (CLARITIN PO) Take by mouth daily     Historical Provider, MD   triamterene-hydrochlorothiazide (MAXZIDE-25) 37.5-25 MG per tablet Take 1 tablet by mouth daily. Historical Provider, MD       Current medications:    No current facility-administered medications for this encounter. Current Outpatient Medications   Medication Sig Dispense Refill    polyethylene glycol (GLYCOLAX) 17 GM/SCOOP powder Take 17 g by mouth daily      b complex vitamins capsule Take 1 capsule by mouth daily      Cholecalciferol (VITAMIN D3) 50 MCG (2000) CAPS Take by mouth daily      Multiple Vitamins-Minerals (EYE VITAMINS & MINERALS PO) Take by mouth daily      Omega-3 Fatty Acids (FISH OIL) 1200 MG CAPS Take by mouth daily      traMADol (ULTRAM) 50 MG tablet daily.       Mastectomy Bra MISC QTY; 4  Diagnosis; c50.911 2 each 0    Probiotic Product (BETHEA [employer] PO) Take by mouth every evening       Loratadine (CLARITIN PO) Take by mouth daily       triamterene-hydrochlorothiazide (MAXZIDE-25) 37.5-25 MG per tablet Take 1 tablet by mouth daily. Allergies: Allergies   Allergen Reactions    Bacitracin Swelling    Seasonal      Sinus sx    Statins      Abdominal pain       Problem List:    Patient Active Problem List   Diagnosis Code    Cancer ([address] C80.1    Cecal cancer ([facility] [address] C18.0    Seasonal allergies J30.2    Blood in stool K92.1    Gastric ulcer without hemorrhage, perforation, or obstruction K25.9    Personal history of colon cancer Z85.038    Diverticulosis of large intestine without diverticulitis K57.30    Bladder tumor D49.4    Vitamin D deficiency E55.9    Tobacco use disorder F17.200    Malignant neoplasm of posterior wall of urinary bladder (HCC) C67.4    Bladder cancer ([facility] [address] C67.9       Past Medical History:        Diagnosis Date    Allergic rhinitis     Arthritis     Breast cancer ([facility] [address]     RT BREAST--dx 1986--oral chemo    Cecal cancer ([facility] [address]     dx 2002--chemo    Diverticulitis     HTN (hypertension)     meds > 20 yrs    Hypercholesterolemia     past trx with meds -- off meds > 5 yrs    Seasonal allergies     Small bowel obstruction ([address]        Past Surgical History:        Procedure Laterality Date    APPENDECTOMY      age 37s   Sheri Muta BREAST SURGERY Right 1987    mastectomy    CATARACT REMOVAL     [address] COLONOSCOPY  4/09 & 1/11    COLONOSCOPY  1/9/13,1/10/11          COLONOSCOPY  1/21/15    polypectomy     CYSTOSCOPY N/A 7/8/2020    CYSTOSCOPY TURBT RESECTOSCOPE performed by Violette Issa MD at [facility]. [address], COLON, DIAGNOSTIC      EYE SURGERY      Phaco with IOL OU    EYE SURGERY      Lasix OU    HERNIA REPAIR Right 12/11    ING.  HERNIA REPAIN W/ MESH    MASTECTOMY, MODIFIED RADICAL Right 1987    OTHER SURGICAL HISTORY      EXC. BACK SKIN CYST    OTHER SURGICAL HISTORY  12/15/2009    PORT REMOVAL    MT COLONOSCOPY FLX DX W/COLLJ SPEC WHEN PFRMD N/A 3/30/2018    [facility] ESOPHAGOGASTRODUODENOSCOPY TRANSORAL DIAGNOSTIC N/A 3/30/2018    EGD ESOPHAGOGASTRODUODENOSCOPY performed by Kelby Mayo MD at [facility]  04/28/09    CECAL CANCER    TUNNELED VENOUS PORT PLACEMENT  06/11/09    REMOVAL 12/09       Social History:    Social History     Tobacco Use    Smoking status: Current Every Day Smoker     Packs/day: 0.25     Years: 50.00     Pack years: 12.50     Types: Cigarettes    Smokeless tobacco: Never Used    Tobacco comment: 1/2 pack per week--past 1 ppd   Substance Use Topics    Alcohol use: No                                Ready to quit: Not Answered  Counseling given: Not Answered  Comment: 1/2 pack per week--past 1 ppd      Vital Signs (Current): There were no vitals filed for this visit.                                            BP Readings from Last 3 Encounters:   09/03/20 (!) 149/73   07/13/20 128/72   07/08/20 (!) 152/69       NPO Status:                                                                                 BMI:   Wt Readings from Last 3 Encounters:   09/03/20 154 lb 6.4 oz (70 kg)   07/13/20 155 lb (70.3 kg)   06/30/20 155 lb 3.2 oz (70.4 kg)     There is no height or weight on file to calculate BMI.    CBC:   Lab Results   Component Value Date    WBC 5.5 09/03/2020    RBC 5.09 09/03/2020    RBC 3.73 01/01/2012    HGB 16.3 09/03/2020    HCT 48.1 09/03/2020    MCV 94.6 09/03/2020    RDW 13.5 09/03/2020     09/03/2020       CMP:   Lab Results   Component Value Date     09/03/2020    K 3.5 09/03/2020    CL 99 09/03/2020    CO2 30 09/03/2020    BUN 12 09/03/2020    CREATININE 0.72 09/03/2020    GFRAA >60.0 09/03/2020    LABGLOM >60.0 09/03/2020    GLUCOSE 106 09/03/2020    GLUCOSE 138 01/01/2012    PROT 7.7 06/18/2020    CALCIUM 9.9 09/03/2020    BILITOT 0.6 06/18/2020    ALKPHOS 91 06/18/2020    AST 23 06/18/2020    ALT 15 06/18/2020       POC Tests: No results for input(s): POCGLU, POCNA, POCK, POCCL, POCBUN, POCHEMO, POCHCT in the last 72 hours. Coags:   Lab Results   Component Value Date    PROTIME 10.1 11/10/2013    INR 1.0 11/10/2013    APTT 32.8 11/10/2013       HCG (If Applicable): No results found for: PREGTESTUR, PREGSERUM, HCG, HCGQUANT     ABGs: No results found for: PHART, PO2ART, XQV3WTW, JBC3BGR, BEART, C9XCAIIN     Type & Screen (If Applicable):  No results found for: LABABO, LABRH    Drug/Infectious Status (If Applicable):  No results found for: HIV, HEPCAB    COVID-19 Screening (If Applicable):   Lab Results   Component Value Date    COVID19 Not Detected 09/03/2020         Anesthesia Evaluation  Patient summary reviewed and Nursing notes reviewed no history of anesthetic complications:   Airway: Mallampati: II  TM distance: >3 FB   Neck ROM: full  Mouth opening: > = 3 FB Dental: normal exam         Pulmonary:normal exam  breath sounds clear to auscultation  (+) current smoker          Patient did not smoke on day of surgery. Cardiovascular:Negative CV ROS  Exercise tolerance: good (>4 METS),   (+) hypertension:,       ECG reviewed  Rhythm: regular  Rate: normal           Beta Blocker:  Not on Beta Blocker         Neuro/Psych:   Negative Neuro/Psych ROS              GI/Hepatic/Renal:   (+) PUD,           Endo/Other:    (+) malignancy/cancer. Pt had PAT visit. Abdominal:           Vascular: negative vascular ROS. Anesthesia Plan      general     ASA 3       Induction: intravenous. MIPS: Postoperative opioids intended and Prophylactic antiemetics administered. Anesthetic plan and risks discussed with patient.       Plan discussed with CRNA.    Attending anesthesiologist reviewed and agrees with Pre Eval content              Delfin Mckeon MD   9/8/2020

## 2020-09-09 NOTE — BRIEF OP NOTE
Brief Postoperative Note      Patient: Anahi Post  YOB: 1935  MRN: [id number]    Date of Procedure: 9/9/2020    Pre-Op Diagnosis: BLADDER CANCER    Post-Op Diagnosis: Same       Procedure(s):  CYSTOSCOPY BLADDER BIOPSY FULGURATION    Surgeon(s):  Kalina Burgess MD    Assistant:  * No surgical staff found *    Anesthesia: General    Estimated Blood Loss (mL): Minimal    Complications: None    Specimens:   ID Type Source Tests Collected by Time Destination   A : bladder bx Tissue Bladder SURGICAL PATHOLOGY Kalina Burgess MD 9/9/2020 9613        Implants:  * No implants in log *      Drains:   [REMOVED] Urethral Catheter (Removed)       Findings: biopsy of previous area of resection    Electronically signed by Kalina Burgess MD on 9/9/2020 at 8:35 AM

## 2020-09-15 ENCOUNTER — VIRTUAL VISIT (OUTPATIENT)
Dept: UROLOGY | Age: 85
End: 2020-09-15

## 2020-09-15 PROCEDURE — 99024 POSTOP FOLLOW-UP VISIT: CPT | Performed by: UROLOGY

## 2020-09-15 NOTE — PROGRESS NOTES
Urology  Postop check after bladder biopsy following resection of bladder tumor 3 months ago  [address] [address]                                        [phone]   FINAL SURGICAL PATHOLOGY REPORT   Patient Name: Nisa Osborne          Accession No:  [id number]    Age Sex:   1935                   Location:      [facility]   Account No:   [de-identified]                  Collected:     2020   [facility] Rec No:    [id number]                   Received:      2020   Attend Phys:   MARINA RIOS                Completed:     2020   Perform Phys: Connie Harvinder           FINAL DIAGNOSIS:   BLADDER BIOPSY:   GRANULOMATOUS CYSTITIS   NO EVIDENCE OF CARCINOMA       COMMENT: AFB AND GMS STAINS SHOW NO EVIDENCE OF ACID-FAST BACILLUS OR   FUNGAL ORGANISMS RESPECTIVELY. AFB AND GMS CONTROLS ARE APPROPRIATE.  THE   GRANULOMATOUS INFLAMMATION MAY BE RELATED TO TREATMENT.  CLINICAL   CORRELATION IS NECESSARY.      JEAN MARIE/CYNTHIA       CLINICAL INFORMATION:   Bladder cancer. Bladder biopsy. SPECIMEN:   Bladder Biopsy     GROSS DESCRIPTION:   Specimen received in formalin in a container labeled with patient's name   and designated as Deon Auguste".  Specimen consists of three pinkish-tan   biopsy fragments measuring up to 2 mm in greatest dimension, submitted in   toto in cassette A1.      CYNTHIA/TARA        patient made aware of the biopsy results  Cystoscopy in the office in 3 months  Postop check  Silviano Delacruz MD

## 2020-10-26 ENCOUNTER — HOSPITAL ENCOUNTER (INPATIENT)
Age: 85
LOS: 2 days | Discharge: HOME | DRG: 390 | End: 2020-10-28
Attending: INTERNAL MEDICINE | Admitting: INTERNAL MEDICINE
Payer: MEDICARE

## 2020-10-26 ENCOUNTER — APPOINTMENT (OUTPATIENT)
Dept: CT IMAGING | Age: 85
DRG: 390 | End: 2020-10-26
Payer: MEDICARE

## 2020-10-26 PROBLEM — K56.609 SBO (SMALL BOWEL OBSTRUCTION) (HCC): Status: ACTIVE | Noted: 2020-10-26

## 2020-10-26 LAB
ALBUMIN SERPL-MCNC: 4.6 G/DL (ref 3.5–4.6)
ALP BLD-CCNC: 110 U/L (ref 40–130)
ALT SERPL-CCNC: 22 U/L (ref 0–33)
ANION GAP SERPL CALCULATED.3IONS-SCNC: 10 MEQ/L (ref 9–15)
AST SERPL-CCNC: 26 U/L (ref 0–35)
BASOPHILS ABSOLUTE: 0 K/UL (ref 0–0.2)
BASOPHILS RELATIVE PERCENT: 0.2 %
BILIRUB SERPL-MCNC: 0.8 MG/DL (ref 0.2–0.7)
BUN BLDV-MCNC: 22 MG/DL (ref 8–23)
CALCIUM SERPL-MCNC: 9.8 MG/DL (ref 8.5–9.9)
CHLORIDE BLD-SCNC: 97 MEQ/L (ref 95–107)
CO2: 32 MEQ/L (ref 20–31)
CREAT SERPL-MCNC: 0.89 MG/DL (ref 0.5–0.9)
EOSINOPHILS ABSOLUTE: 0 K/UL (ref 0–0.7)
EOSINOPHILS RELATIVE PERCENT: 0.1 %
GFR AFRICAN AMERICAN: >60
GFR AFRICAN AMERICAN: >60
GFR NON-AFRICAN AMERICAN: >60
GFR NON-AFRICAN AMERICAN: >60
GLOBULIN: 3.3 G/DL (ref 2.3–3.5)
GLUCOSE BLD-MCNC: 179 MG/DL (ref 70–99)
HCT VFR BLD CALC: 43.2 % (ref 37–47)
HEMOGLOBIN: 14.7 G/DL (ref 12–16)
LACTIC ACID: 1.2 MMOL/L (ref 0.5–2.2)
LYMPHOCYTES ABSOLUTE: 0.4 K/UL (ref 1–4.8)
LYMPHOCYTES RELATIVE PERCENT: 3.4 %
MCH RBC QN AUTO: 32.1 PG (ref 27–31.3)
MCHC RBC AUTO-ENTMCNC: 34.1 % (ref 33–37)
MCV RBC AUTO: 94.3 FL (ref 82–100)
MONOCYTES ABSOLUTE: 0.4 K/UL (ref 0.2–0.8)
MONOCYTES RELATIVE PERCENT: 3.6 %
NEUTROPHILS ABSOLUTE: 10.7 K/UL (ref 1.4–6.5)
NEUTROPHILS RELATIVE PERCENT: 92.7 %
PDW BLD-RTO: 13.5 % (ref 11.5–14.5)
PERFORMED ON: NORMAL
PLATELET # BLD: 211 K/UL (ref 130–400)
POC CREATININE WHOLE BLOOD: 0.7
POC CREATININE: 0.7 MG/DL (ref 0.6–1.2)
POC SAMPLE TYPE: NORMAL
POTASSIUM SERPL-SCNC: 3.6 MEQ/L (ref 3.4–4.9)
RBC # BLD: 4.59 M/UL (ref 4.2–5.4)
SODIUM BLD-SCNC: 139 MEQ/L (ref 135–144)
TOTAL PROTEIN: 7.9 G/DL (ref 6.3–8)
TROPONIN: <0.01 NG/ML (ref 0–0.01)
WBC # BLD: 11.5 K/UL (ref 4.8–10.8)

## 2020-10-26 PROCEDURE — 99285 EMERGENCY DEPT VISIT HI MDM: CPT

## 2020-10-26 PROCEDURE — 96376 TX/PRO/DX INJ SAME DRUG ADON: CPT

## 2020-10-26 PROCEDURE — 96374 THER/PROPH/DIAG INJ IV PUSH: CPT

## 2020-10-26 PROCEDURE — 2500000003 HC RX 250 WO HCPCS: Performed by: INTERNAL MEDICINE

## 2020-10-26 PROCEDURE — 2580000003 HC RX 258: Performed by: PHYSICIAN ASSISTANT

## 2020-10-26 PROCEDURE — 1210000000 HC MED SURG R&B

## 2020-10-26 PROCEDURE — 84484 ASSAY OF TROPONIN QUANT: CPT

## 2020-10-26 PROCEDURE — 96375 TX/PRO/DX INJ NEW DRUG ADDON: CPT

## 2020-10-26 PROCEDURE — 6360000002 HC RX W HCPCS: Performed by: INTERNAL MEDICINE

## 2020-10-26 PROCEDURE — 6360000002 HC RX W HCPCS: Performed by: PHYSICIAN ASSISTANT

## 2020-10-26 PROCEDURE — G0378 HOSPITAL OBSERVATION PER HR: HCPCS

## 2020-10-26 PROCEDURE — 83605 ASSAY OF LACTIC ACID: CPT

## 2020-10-26 PROCEDURE — 87149 DNA/RNA DIRECT PROBE: CPT

## 2020-10-26 PROCEDURE — 87040 BLOOD CULTURE FOR BACTERIA: CPT

## 2020-10-26 PROCEDURE — 85025 COMPLETE CBC W/AUTO DIFF WBC: CPT

## 2020-10-26 PROCEDURE — 36415 COLL VENOUS BLD VENIPUNCTURE: CPT

## 2020-10-26 PROCEDURE — 99221 1ST HOSP IP/OBS SF/LOW 40: CPT | Performed by: COLON & RECTAL SURGERY

## 2020-10-26 PROCEDURE — 6360000004 HC RX CONTRAST MEDICATION: Performed by: PHYSICIAN ASSISTANT

## 2020-10-26 PROCEDURE — 74177 CT ABD & PELVIS W/CONTRAST: CPT

## 2020-10-26 PROCEDURE — 80053 COMPREHEN METABOLIC PANEL: CPT

## 2020-10-26 PROCEDURE — 0D9670Z DRAINAGE OF STOMACH WITH DRAINAGE DEVICE, VIA NATURAL OR ARTIFICIAL OPENING: ICD-10-PCS | Performed by: INTERNAL MEDICINE

## 2020-10-26 PROCEDURE — 2580000003 HC RX 258: Performed by: INTERNAL MEDICINE

## 2020-10-26 RX ORDER — ACETAMINOPHEN 325 MG/1
650 TABLET ORAL EVERY 6 HOURS PRN
Status: DISCONTINUED | OUTPATIENT
Start: 2020-10-26 | End: 2020-10-28 | Stop reason: HOSPADM

## 2020-10-26 RX ORDER — PROMETHAZINE HYDROCHLORIDE 12.5 MG/1
12.5 TABLET ORAL EVERY 6 HOURS PRN
Status: DISCONTINUED | OUTPATIENT
Start: 2020-10-26 | End: 2020-10-28 | Stop reason: HOSPADM

## 2020-10-26 RX ORDER — ONDANSETRON 2 MG/ML
4 INJECTION INTRAMUSCULAR; INTRAVENOUS EVERY 6 HOURS PRN
Status: DISCONTINUED | OUTPATIENT
Start: 2020-10-26 | End: 2020-10-28 | Stop reason: HOSPADM

## 2020-10-26 RX ORDER — SODIUM CHLORIDE 0.9 % (FLUSH) 0.9 %
10 SYRINGE (ML) INJECTION PRN
Status: DISCONTINUED | OUTPATIENT
Start: 2020-10-26 | End: 2020-10-28 | Stop reason: HOSPADM

## 2020-10-26 RX ORDER — SODIUM CHLORIDE 0.9 % (FLUSH) 0.9 %
10 SYRINGE (ML) INJECTION ONCE
Status: COMPLETED | OUTPATIENT
Start: 2020-10-26 | End: 2020-10-26

## 2020-10-26 RX ORDER — ACETAMINOPHEN 650 MG/1
650 SUPPOSITORY RECTAL EVERY 6 HOURS PRN
Status: DISCONTINUED | OUTPATIENT
Start: 2020-10-26 | End: 2020-10-28 | Stop reason: HOSPADM

## 2020-10-26 RX ORDER — ONDANSETRON 2 MG/ML
4 INJECTION INTRAMUSCULAR; INTRAVENOUS ONCE
Status: COMPLETED | OUTPATIENT
Start: 2020-10-26 | End: 2020-10-26

## 2020-10-26 RX ORDER — 0.9 % SODIUM CHLORIDE 0.9 %
1000 INTRAVENOUS SOLUTION INTRAVENOUS ONCE
Status: COMPLETED | OUTPATIENT
Start: 2020-10-26 | End: 2020-10-26

## 2020-10-26 RX ORDER — FENTANYL CITRATE 50 UG/ML
25 INJECTION, SOLUTION INTRAMUSCULAR; INTRAVENOUS ONCE
Status: COMPLETED | OUTPATIENT
Start: 2020-10-26 | End: 2020-10-26

## 2020-10-26 RX ORDER — SODIUM CHLORIDE, SODIUM LACTATE, POTASSIUM CHLORIDE, CALCIUM CHLORIDE 600; 310; 30; 20 MG/100ML; MG/100ML; MG/100ML; MG/100ML
INJECTION, SOLUTION INTRAVENOUS CONTINUOUS
Status: DISCONTINUED | OUTPATIENT
Start: 2020-10-26 | End: 2020-10-28 | Stop reason: HOSPADM

## 2020-10-26 RX ORDER — MORPHINE SULFATE 2 MG/ML
1 INJECTION, SOLUTION INTRAMUSCULAR; INTRAVENOUS EVERY 4 HOURS PRN
Status: DISCONTINUED | OUTPATIENT
Start: 2020-10-26 | End: 2020-10-28 | Stop reason: HOSPADM

## 2020-10-26 RX ORDER — SODIUM CHLORIDE 0.9 % (FLUSH) 0.9 %
10 SYRINGE (ML) INJECTION EVERY 12 HOURS SCHEDULED
Status: DISCONTINUED | OUTPATIENT
Start: 2020-10-26 | End: 2020-10-28 | Stop reason: HOSPADM

## 2020-10-26 RX ORDER — POLYETHYLENE GLYCOL 3350 17 G/17G
17 POWDER, FOR SOLUTION ORAL DAILY PRN
Status: DISCONTINUED | OUTPATIENT
Start: 2020-10-26 | End: 2020-10-28 | Stop reason: HOSPADM

## 2020-10-26 RX ADMIN — Medication 10 ML: at 11:40

## 2020-10-26 RX ADMIN — SODIUM CHLORIDE, POTASSIUM CHLORIDE, SODIUM LACTATE AND CALCIUM CHLORIDE: 600; 310; 30; 20 INJECTION, SOLUTION INTRAVENOUS at 23:02

## 2020-10-26 RX ADMIN — IOPAMIDOL 100 ML: 612 INJECTION, SOLUTION INTRAVENOUS at 07:24

## 2020-10-26 RX ADMIN — SODIUM CHLORIDE 1000 ML: 9 INJECTION, SOLUTION INTRAVENOUS at 06:36

## 2020-10-26 RX ADMIN — SODIUM CHLORIDE, POTASSIUM CHLORIDE, SODIUM LACTATE AND CALCIUM CHLORIDE: 600; 310; 30; 20 INJECTION, SOLUTION INTRAVENOUS at 11:36

## 2020-10-26 RX ADMIN — FAMOTIDINE 20 MG: 10 INJECTION INTRAVENOUS at 23:00

## 2020-10-26 RX ADMIN — FAMOTIDINE 20 MG: 10 INJECTION INTRAVENOUS at 14:28

## 2020-10-26 RX ADMIN — ONDANSETRON 4 MG: 2 INJECTION INTRAMUSCULAR; INTRAVENOUS at 06:56

## 2020-10-26 RX ADMIN — ONDANSETRON 4 MG: 2 INJECTION INTRAMUSCULAR; INTRAVENOUS at 11:36

## 2020-10-26 RX ADMIN — FENTANYL CITRATE 25 MCG: 50 INJECTION INTRAMUSCULAR; INTRAVENOUS at 06:56

## 2020-10-26 RX ADMIN — FENTANYL CITRATE 25 MCG: 0.05 INJECTION, SOLUTION INTRAMUSCULAR; INTRAVENOUS at 08:30

## 2020-10-26 RX ADMIN — Medication 10 ML: at 07:24

## 2020-10-26 ASSESSMENT — PAIN SCALES - GENERAL
PAINLEVEL_OUTOF10: 4
PAINLEVEL_OUTOF10: 5
PAINLEVEL_OUTOF10: 8
PAINLEVEL_OUTOF10: 7

## 2020-10-26 ASSESSMENT — ENCOUNTER SYMPTOMS
SHORTNESS OF BREATH: 0
COUGH: 0
EYE PAIN: 0
NAUSEA: 1
BACK PAIN: 0
ABDOMINAL PAIN: 1
SORE THROAT: 0
RHINORRHEA: 0
VOMITING: 0
DIARRHEA: 0
PHOTOPHOBIA: 0

## 2020-10-26 ASSESSMENT — PAIN DESCRIPTION - DESCRIPTORS: DESCRIPTORS: STABBING;SHOOTING

## 2020-10-26 ASSESSMENT — PAIN DESCRIPTION - PAIN TYPE: TYPE: ACUTE PAIN

## 2020-10-26 ASSESSMENT — PAIN DESCRIPTION - LOCATION: LOCATION: ABDOMEN

## 2020-10-26 NOTE — H&P
Hospital Medicine  History and Physical    Patient:  Nallely Velázquez  MRN: [id number]    CHIEF COMPLAINT:    Chief Complaint   Patient presents with    Abdominal Pain       History Obtained From:  Patient, EMR  Primary Care Physician: Uche Gaxiola MD    HISTORY OF PRESENT ILLNESS:   The patient is a 82 y.o. female with PMH of colon cancer status post colectomy. She came to the emergency room with 2 days history of progressive abdominal distention, discomfort, nausea and vomiting. No fever or chills. No hematemesis or melena. Patient had normal bowel movement. 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    Diverticulitis     HTN (hypertension)     meds > 20 yrs    Hypercholesterolemia     past trx with meds -- off meds > 5 yrs    Seasonal allergies     Small bowel obstruction ([facility] [address]        Past Surgical History:      Procedure Laterality Date    APPENDECTOMY      age 31s    BREAST SURGERY Right 1987    mastectomy   [facility] COLONOSCOPY  4/09 & 1/11    COLONOSCOPY  1/9/13,1/10/11          COLONOSCOPY  1/21/15    polypectomy     CYSTOSCOPY N/A 7/8/2020    CYSTOSCOPY TURBT RESECTOSCOPE performed by Flaca Colin MD at [place] [address] N/A 9/9/2020    CYSTOSCOPY BLADDER BIOPSY FULGURATION performed by Flaca Colin MD at [facility]. [address], COLON, DIAGNOSTIC      EYE SURGERY      Phaco with IOL OU    EYE SURGERY      Lasix OU    HERNIA REPAIR Right 12/11    ING. HERNIA REPAIN W/ MESH    MASTECTOMY, MODIFIED RADICAL Right 1987    OTHER SURGICAL HISTORY      EXC.  BACK SKIN CYST    OTHER SURGICAL HISTORY  12/15/2009    PORT REMOVAL    [place] COLONOSCOPY FLX DX W/COLLJ SPEC WHEN PFRMD N/A 3/30/2018    COLONOSCOPY performed by Yakov Calderón MD at [facility]  [place] ESOPHAGOGASTRODUODENOSCOPY TRANSORAL DIAGNOSTIC N/A 3/30/2018    EGD ESOPHAGOGASTRODUODENOSCOPY performed by Poncho Rush MD at [facility]  04/28/09    CECAL CANCER    TUNNELED VENOUS PORT PLACEMENT  06/11/09    REMOVAL 12/09       Medications Prior to Admission:    Prior to Admission medications    Medication Sig Start Date End Date Taking? Authorizing Provider   polyethylene glycol (GLYCOLAX) 17 GM/SCOOP powder Take 17 g by mouth daily   Yes Historical Provider, MD   b complex vitamins capsule Take 1 capsule by mouth daily   Yes Historical Provider, MD   Cholecalciferol (VITAMIN D3) 50 MCG (2000 UT) CAPS Take by mouth daily   Yes Historical Provider, MD   Omega-3 Fatty Acids (FISH OIL) 1200 MG CAPS Take by mouth daily   Yes Historical Provider, MD   traMADol (ULTRAM) 50 MG tablet Take 25 mg by mouth every 8 hours. 6/10/20  Yes Historical Provider, MD   Probiotic Product ([address]) Take 1 capsule by mouth every evening    Yes Historical Provider, MD   triamterene-hydrochlorothiazide (MAXZIDE-25) 37.5-25 MG per tablet Take 1 tablet by mouth daily. Yes Historical Provider, MD   Loratadine (CLARITIN PO) Take 1 tablet by mouth daily     Historical Provider, MD       Allergies:  Bacitracin; Seasonal; and Statins    Social History:   TOBACCO:   reports that she has been smoking cigarettes. She has a 12.50 pack-year smoking history. She has never used smokeless tobacco.  ETOH:   reports no history of alcohol use.       Family History:       Problem Relation Age of Onset    Heart Disease Mother     Dementia Mother     Colon Polyps Mother     Diabetes Father     Obesity Father        REVIEW OF SYSTEMS:  Ten systems reviewed and negative except for stated in HPI    Physical Exam:    Vitals: BP (!) 148/59   Pulse 84   Temp 98.2 °F (36.8 °C) (Oral)   Resp 18   Ht 5' 2\" (1.575 m)   Wt 155 lb (70.3 kg)   LMP 06/30/1980   SpO2 99%   BMI 28.35 kg/m² General appearance: alert, appears stated age and cooperative, mild acute distress secondary to abdominal distention. Skin: Skin color, texture, turgor normal. No rashes or lesions  HEENT: Head: Normocephalic, no lesions, without obvious abnormality. Neck: no adenopathy, no carotid bruit, no JVD, supple, symmetrical, trachea midline and thyroid not enlarged, symmetric, no tenderness/mass/nodules  Lungs: clear to auscultation bilaterally  Heart: regular rate and rhythm, S1, S2 normal, no murmur, click, rub or gallop  Abdomen: Diffuse abdominal distention. Diffuse mild abdominal tenderness. Extremities: extremities normal, atraumatic, no cyanosis or edema  Neurologic: Mental status: Alert, oriented, thought content appropriate     Recent Labs     10/26/20  0630   WBC 11.5*   HGB 14.7        Recent Labs     10/26/20  0630 10/26/20  0657     --    K 3.6  --    CL 97  --    CO2 32*  --    BUN 22  --    CREATININE 0.89 0.7   GLUCOSE 179*  --    AST 26  --    ALT 22  --    BILITOT 0.8*  --    ALKPHOS 110  --      Troponin T:   Recent Labs     10/26/20  0630   TROPONINI <0.010       ABGs: No results found for: PHART, PO2ART, FIZ1IYH  INR: No results for input(s): INR in the last 72 hours. URINALYSIS:No results for input(s): NITRITE, COLORU, PHUR, LABCAST, WBCUA, RBCUA, MUCUS, TRICHOMONAS, YEAST, BACTERIA, CLARITYU, SPECGRAV, LEUKOCYTESUR, UROBILINOGEN, BILIRUBINUR, BLOODU, GLUCOSEU, AMORPHOUS in the last 72 hours. Invalid input(s): KETONESU  -----------------------------------------------------------------   Ct Abdomen Pelvis W Iv Contrast Additional Contrast? None    Result Date: 10/26/2020  EXAMINATION: CT ABDOMEN PELVIS W IV CONTRAST HISTORY:  pain  COMPARISON: CT abdomen and pelvis from June 23, 2020 TECHNIQUE: Contiguous axial CT sections of the abdomen and pelvis. Imaging was obtained after IV contrast administration. .  All CT scans at this facility use dose modulation, iterative and severe degenerative changes of the left hip. 1. The findings are consistent with a partial small bowel obstruction. There are dilated loops of proximal small bowel measuring up to 3.6 cm in greatest diameter with a positive ultrasonographic transition in the right lower quadrant. The terminal ileum  is decompressed. There appears to have been a right colectomy with an ileocolonic reanastomosis in the right upper quadrant. 2. There is increased density material in the dependent portion of the urinary bladder which may represent sedimentation versus blood products. 3. Otherwise there are no acute intra-abdominal changes           Assessment and Plan     *Abdominal pain, nausea and vomiting, SBO is suspected. Other etiologies cannot be excluded at this time. *History of colon cancer status post colectomy followed by chemotherapy. *Vitamin D deficiency. Plan:  I had accepted to admit the patient to the medical floor. I made the patient n.p.o.  I started LR at 75 an hour. NG tube with NG suction suspecting that this will progress over the next 24 hours. Surgical consultation to evaluate her abdominal pain and the provide further advice and recommendations. Patient status is dynamic and evolutionary therefore the aforementioned assessment and plan may or may not be complete or conclusive at this time. Additional work-up, investigation, therapeutic intervention will be determined based on the clinical progression and specialists recommendations.       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 High Risk Readmission Screening Tool Score Noted.      Emergency Contact:

## 2020-10-26 NOTE — ED NOTES
Bed: 06  Expected date:   Expected time:   Means of arrival:   Comments:  SVEN Jesus RN  10/26/20 0263

## 2020-10-26 NOTE — ED NOTES
Patient up with steady gait to the bathroom with cane  Patient complains of pain 7/10 in [facility]  10/26/20 9867

## 2020-10-26 NOTE — ED PROVIDER NOTES
[address] [facility] ED  EMERGENCY DEPARTMENT ENCOUNTER      Pt Name: Carly Brandon  MRN: [id number]  [place] 1935  Date of evaluation: 10/26/2020  Provider: Parviz Alegre PA-C      HISTORY OF PRESENT ILLNESS    Carly Brandon is a 82 y.o. female who presents to the Emergency Department with right sided abdominal pain since last night at ten pm. Pain is intermitittent crampy pain. Associated nausea without emesis. Denies cp sob fever urinary symptoms. States bm have been normal for her. Has h/o of multiple significant abdominal surgeries. REVIEW OF SYSTEMS       Review of Systems   Constitutional: Negative for chills, diaphoresis, fatigue and fever. HENT: Negative for congestion, rhinorrhea and sore throat. Eyes: Negative for photophobia and pain. Respiratory: Negative for cough and shortness of breath. Cardiovascular: Negative for chest pain and palpitations. Gastrointestinal: Positive for abdominal pain and nausea. Negative for diarrhea and vomiting. Genitourinary: Negative for dysuria and flank pain. Musculoskeletal: Negative for back pain. Skin: Negative for rash. Neurological: Negative for dizziness, light-headedness and headaches. Psychiatric/Behavioral: Negative. All other systems reviewed and are negative.         PAST MEDICAL HISTORY     Past Medical History:   Diagnosis Date    Allergic rhinitis     Arthritis     Breast cancer ([address]     RT BREAST--dx 1986--oral chemo    Cecal cancer ([address]     dx 2002--chemo    Diverticulitis     HTN (hypertension)     meds > 20 yrs    Hypercholesterolemia     past trx with meds -- off meds > 5 yrs    Seasonal allergies     Small bowel obstruction ([address]          SURGICAL HISTORY       Past Surgical History:   Procedure Laterality Date    APPENDECTOMY      age 31s    BREAST SURGERY Right 1987    mastectomy    CATARACT REMOVAL     [address] COLONOSCOPY  4/09 & 1/11    COLONOSCOPY  1/9/13,1/10/11     COLONOSCOPY  1/21/15    polypectomy     CYSTOSCOPY N/A 7/8/2020    CYSTOSCOPY TURBT RESECTOSCOPE performed by Louisa Martinez MD at [address] N/A 9/9/2020    CYSTOSCOPY BLADDER BIOPSY FULGURATION performed by Louisa Martinez MD at [facility], DIAGNOSTIC      EYE SURGERY      Phaco with IOL OU    EYE SURGERY      Lasix OU    HERNIA REPAIR Right 12/11    ING. HERNIA REPAIN W/ MESH    MASTECTOMY, MODIFIED RADICAL Right 1987    OTHER SURGICAL HISTORY      EXC. BACK SKIN CYST    OTHER SURGICAL HISTORY  12/15/2009    PORT REMOVAL    ME COLONOSCOPY FLX DX W/COLLJ SPEC WHEN PFRMD N/A 3/30/2018    COLONOSCOPY performed by David Cuenca MD at [facility] [address] ESOPHAGOGASTRODUODENOSCOPY TRANSORAL DIAGNOSTIC N/A 3/30/2018    EGD ESOPHAGOGASTRODUODENOSCOPY performed by [facility]enca MD at [facility]  04/28/09    CECAL CANCER    TUNNELED VENOUS PORT PLACEMENT  06/11/09    REMOVAL 12/09         CURRENT MEDICATIONS       Previous Medications    B COMPLEX VITAMINS CAPSULE    Take 1 capsule by mouth daily    CHOLECALCIFEROL (VITAMIN D3) 50 MCG (2000 UT) CAPS    Take by mouth daily    LORATADINE (CLARITIN PO)    Take by mouth daily     MASTECTOMY BRA MISC    QTY; 4  Diagnosis; c50.911    OMEGA-3 FATTY ACIDS (FISH OIL) 1200 MG CAPS    Take by mouth daily    POLYETHYLENE GLYCOL (GLYCOLAX) 17 GM/SCOOP POWDER    Take 17 g by mouth daily    PROBIOTIC PRODUCT ([place] PO)    Take by mouth every evening     TRAMADOL (ULTRAM) 50 MG TABLET    daily. TRIAMTERENE-HYDROCHLOROTHIAZIDE (MAXZIDE-25) 37.5-25 MG PER TABLET    Take 1 tablet by mouth daily.          ALLERGIES     Bacitracin; Seasonal; and Statins    FAMILY HISTORY       Family History   Problem Relation Age of Onset    Heart Disease Mother     Dementia Mother     Colon Polyps Mother     Diabetes Father     Obesity Father SOCIAL HISTORY       Social History     Socioeconomic History    Marital status:      Spouse name: None    Number of children: None    Years of education: None    Highest education level: None   Occupational History    None   Social Needs    Financial resource strain: None    Food insecurity     Worry: None     Inability: None    Transportation needs     Medical: None     Non-medical: None   Tobacco Use    Smoking status: Current Every Day Smoker     Packs/day: 0.25     Years: 50.00     Pack years: 12.50     Types: Cigarettes    Smokeless tobacco: Never Used    Tobacco comment: 1/2 pack per week--past 1 ppd   Substance and Sexual Activity    Alcohol use: No    Drug use: Never    Sexual activity: None   Lifestyle    Physical activity     Days per week: None     Minutes per session: None    Stress: None   Relationships    Social connections     Talks on phone: None     Gets together: None     Attends [employer] witness service: None     Active member of club or organization: None     Attends meetings of clubs or organizations: None     Relationship status: None    Intimate partner violence     Fear of current or ex partner: None     Emotionally abused: None     Physically abused: None     Forced sexual activity: None   Other Topics Concern    None   Social History Narrative    None       SCREENINGS             PHYSICAL EXAM    (up to 7 for level 4, 8 or more for level 5)     ED Triage Vitals [10/26/20 0607]   BP Temp Temp Source Pulse Resp SpO2 Height Weight   (!) 148/63 98.1 °F (36.7 °C) Oral 75 20 95 % 5' 2\" (1.575 m) 155 lb (70.3 kg)       Physical Exam  Vitals signs and nursing note reviewed. Constitutional:       General: She is not in acute distress. Appearance: Normal appearance. She is well-developed. She is not diaphoretic. HENT:      Head: Normocephalic and atraumatic.    Eyes:      General: Lids are normal.      Conjunctiva/sclera: Conjunctivae normal.   Neck: Musculoskeletal: Normal range of motion and neck supple. Cardiovascular:      Rate and Rhythm: Normal rate and regular rhythm. Pulses: Normal pulses. Heart sounds: Normal heart sounds. Pulmonary:      Effort: Pulmonary effort is normal.      Breath sounds: Normal breath sounds. Abdominal:      General: Bowel sounds are normal.      Palpations: Abdomen is soft. Tenderness: There is abdominal tenderness in the right lower quadrant. There is no right CVA tenderness, left CVA tenderness, guarding or rebound. Lymphadenopathy:      Cervical: No cervical adenopathy. Skin:     General: Skin is warm and dry. Capillary Refill: Capillary refill takes less than 2 seconds. Findings: No rash. Neurological:      Mental Status: She is alert and oriented to person, place, and time. Psychiatric:         Thought Content: Thought content normal.         Judgment: Judgment normal.           All other labs were within normal range or not returned as of this dictation. EMERGENCY DEPARTMENT COURSE and DIFFERENTIALDIAGNOSIS/MDM:   Vitals:    Vitals:    10/26/20 0607 10/26/20 0700 10/26/20 0800   BP: (!) 148/63 (!) 143/64 (!) 147/73   Pulse: 75 75 82   Resp: 20 18 16   Temp: 98.1 °F (36.7 °C)     TempSrc: Oral     SpO2: 95% 100% 96%   Weight: 155 lb (70.3 kg)     Height: 5' 2\" (1.575 m)              She has nontoxic no acute distress she is afebrile hemodynamically stable. She is not having any emesis but does have associated nausea. Labs are remarkable for leukocytosis of 11,000 otherwise are grossly normal.  CT scan is concerning for a partial bowel obstruction. CT scan was reviewed with general surgeon Dr. Annabella Lira, recommended observation for the patient, no NG at this time and he will see the patient in consult. Patient be admitted to the hospitalist. Pt is stable and ready for admission.        PROCEDURES:  Unless otherwise noted below, none     Procedures      FINAL IMPRESSION      1. Partial small bowel obstruction [facility])          DISPOSITION/PLAN   DISPOSITION Decision To Admit 10/26/2020 08:07:16 AM          Gisselle Murguia (electronically signed)  Attending Emergency Physician       To Warren PA-C  10/26/20 7271    Attending Supervisory Note/Shared Visit   I have personally performed a face to face diagnostic evaluation on this patient. I have reviewed the mid-levels findings and agree.   History and Exam by me shows partial small bowel obstruction      Pascual Gunderson DO  Attending Emergency Physician         Pascual Gunderson DO  10/26/20 9052

## 2020-10-26 NOTE — CONSULTS
Department of General Surgery - Adult  Surgical Service colorectal surgery  Attending Consult Note      Reason for Consult: Small bowel obstruction      CHIEF COMPLAINT: Abdominal distention    History Obtained From:  patient, electronic medical record    HISTORY OF PRESENT ILLNESS:                The patient is a 82 y.o. female who presents with abdominal distention with nausea over the past 24 hours. She was seen in the emergency department and had a CAT scan which showed findings consistent with a partial small bowel obstruction. She was admitted to the hospital.    She did not require nasogastric tube. She denies abdominal pain. She still has a fullness in her abdomen. She had a previous right colectomy by Dr. Carol Suárez. Her last bowel movement was yesterday. She denies fever. Past Medical History:        Diagnosis Date    Allergic rhinitis     Arthritis     Breast cancer ([address]     RT BREAST--dx 1986--oral chemo    Cecal cancer ([address]     dx 2002--chemo    Diverticulitis     HTN (hypertension)     meds > 20 yrs    Hypercholesterolemia     past trx with meds -- off meds > 5 yrs    Seasonal allergies     Small bowel obstruction ([address]      Past Surgical History:        Procedure Laterality Date    APPENDECTOMY      age 31s    BREAST SURGERY Right 1987    mastectomy   [facility] COLONOSCOPY  4/09 & 1/11    COLONOSCOPY  1/9/13,1/10/11          COLONOSCOPY  1/21/15    polypectomy     CYSTOSCOPY N/A 7/8/2020    CYSTOSCOPY TURBT RESECTOSCOPE performed by Marissa Durham MD at [place] [address] N/A 9/9/2020    CYSTOSCOPY BLADDER BIOPSY FULGURATION performed by Marissa Durham MD at [facility]. [address], COLON, DIAGNOSTIC      EYE SURGERY      Phaco with IOL OU    EYE SURGERY      Lasix OU    HERNIA REPAIR Right 12/11    ING.  HERNIA REPAIN W/ MESH    MASTECTOMY, MODIFIED RADICAL Right 1987    OTHER SURGICAL HISTORY      EXC. BACK SKIN CYST    OTHER SURGICAL HISTORY  12/15/2009    PORT REMOVAL    [place] COLONOSCOPY FLX DX W/COLLJ SPEC WHEN PFRMD N/A 3/30/2018    COLONOSCOPY performed by Dany Zeng MD at [address] ESOPHAGOGASTRODUODENOSCOPY TRANSORAL DIAGNOSTIC N/A 3/30/2018    EGD ESOPHAGOGASTRODUODENOSCOPY performed by Dany Zeng MD at [facility]  04/28/09    CECAL CANCER    TUNNELED VENOUS PORT PLACEMENT  06/11/09    REMOVAL 12/09     Current Medications:   Current Facility-Administered Medications: sodium chloride flush 0.9 % injection 10 mL, 10 mL, Intravenous, 2 times per day  sodium chloride flush 0.9 % injection 10 mL, 10 mL, Intravenous, PRN  acetaminophen (TYLENOL) tablet 650 mg, 650 mg, Oral, Q6H PRN **OR** acetaminophen (TYLENOL) suppository 650 mg, 650 mg, Rectal, Q6H PRN  polyethylene glycol (GLYCOLAX) packet 17 g, 17 g, Oral, Daily PRN  promethazine (PHENERGAN) tablet 12.5 mg, 12.5 mg, Oral, Q6H PRN **OR** ondansetron (ZOFRAN) injection 4 mg, 4 mg, Intravenous, Q6H PRN  [START ON 10/27/2020] enoxaparin (LOVENOX) injection 40 mg, 40 mg, Subcutaneous, Daily  lactated ringers infusion, , Intravenous, Continuous  morphine (PF) injection 1 mg, 1 mg, Intravenous, Q4H PRN  famotidine (PEPCID) injection 20 mg, 20 mg, Intravenous, BID  Allergies:  Bacitracin; Seasonal; and Statins    Social History:   TOBACCO:   reports that she has been smoking cigarettes. She has a 12.50 pack-year smoking history. She has never used smokeless tobacco.  ETOH:   reports no history of alcohol use. DRUGS:   reports no history of drug use.   Family History:       Problem Relation Age of Onset    Heart Disease Mother     Dementia Mother     Colon Polyps Mother     Diabetes Father     Obesity Father        REVIEW OF SYSTEMS:    CONSTITUTIONAL:  negative  EYES:  negative  HEENT:  negative  RESPIRATORY:  negative  CARDIOVASCULAR:  negative  GASTROINTESTINAL:  10/26/2020    MPV 8.7 02/23/2014     CMP:    Lab Results   Component Value Date     10/26/2020    K 3.6 10/26/2020    CL 97 10/26/2020    CO2 32 10/26/2020    BUN 22 10/26/2020    CREATININE 0.7 10/26/2020    CREATININE 0.89 10/26/2020    GFRAA >60 10/26/2020    LABGLOM >60 10/26/2020    GLUCOSE 179 10/26/2020    GLUCOSE 138 01/01/2012    PROT 7.9 10/26/2020    LABALBU 4.6 10/26/2020    LABALBU 3.2 01/01/2012    CALCIUM 9.8 10/26/2020    BILITOT 0.8 10/26/2020    ALKPHOS 110 10/26/2020    AST 26 10/26/2020    ALT 22 10/26/2020     Radiology Review: CT scan reviewed    IMPRESSION/RECOMMENDATIONS:      Partial small bowel obstruction. Conservative management described. No immediate surgical concerns. Will follow closely. Any questions please call. Thank you for allowing me take part in this nice woman's care.

## 2020-10-27 ENCOUNTER — APPOINTMENT (OUTPATIENT)
Dept: GENERAL RADIOLOGY | Age: 85
DRG: 390 | End: 2020-10-27
Payer: MEDICARE

## 2020-10-27 LAB
ALBUMIN SERPL-MCNC: 3.4 G/DL (ref 3.5–4.6)
ALP BLD-CCNC: 93 U/L (ref 40–130)
ALT SERPL-CCNC: 64 U/L (ref 0–33)
ANION GAP SERPL CALCULATED.3IONS-SCNC: 9 MEQ/L (ref 9–15)
AST SERPL-CCNC: 45 U/L (ref 0–35)
BASOPHILS ABSOLUTE: 0 K/UL (ref 0–0.2)
BASOPHILS RELATIVE PERCENT: 0.1 %
BILIRUB SERPL-MCNC: 0.9 MG/DL (ref 0.2–0.7)
BUN BLDV-MCNC: 14 MG/DL (ref 8–23)
CALCIUM SERPL-MCNC: 8.2 MG/DL (ref 8.5–9.9)
CHLORIDE BLD-SCNC: 105 MEQ/L (ref 95–107)
CO2: 29 MEQ/L (ref 20–31)
CREAT SERPL-MCNC: 0.76 MG/DL (ref 0.5–0.9)
EOSINOPHILS ABSOLUTE: 0.1 K/UL (ref 0–0.7)
EOSINOPHILS RELATIVE PERCENT: 1.8 %
GFR AFRICAN AMERICAN: >60
GFR NON-AFRICAN AMERICAN: >60
GLOBULIN: 2.6 G/DL (ref 2.3–3.5)
GLUCOSE BLD-MCNC: 105 MG/DL (ref 70–99)
HCT VFR BLD CALC: 36.6 % (ref 37–47)
HEMOGLOBIN: 12.3 G/DL (ref 12–16)
LYMPHOCYTES ABSOLUTE: 0.8 K/UL (ref 1–4.8)
LYMPHOCYTES RELATIVE PERCENT: 13.3 %
MAGNESIUM: 2 MG/DL (ref 1.7–2.4)
MCH RBC QN AUTO: 32.3 PG (ref 27–31.3)
MCHC RBC AUTO-ENTMCNC: 33.7 % (ref 33–37)
MCV RBC AUTO: 95.7 FL (ref 82–100)
MONOCYTES ABSOLUTE: 0.6 K/UL (ref 0.2–0.8)
MONOCYTES RELATIVE PERCENT: 10 %
NEUTROPHILS ABSOLUTE: 4.4 K/UL (ref 1.4–6.5)
NEUTROPHILS RELATIVE PERCENT: 74.8 %
PDW BLD-RTO: 13.5 % (ref 11.5–14.5)
PHOSPHORUS: 2.9 MG/DL (ref 2.3–4.8)
PLATELET # BLD: 159 K/UL (ref 130–400)
POTASSIUM SERPL-SCNC: 3.2 MEQ/L (ref 3.4–4.9)
RBC # BLD: 3.82 M/UL (ref 4.2–5.4)
SODIUM BLD-SCNC: 143 MEQ/L (ref 135–144)
TOTAL PROTEIN: 6 G/DL (ref 6.3–8)
WBC # BLD: 5.8 K/UL (ref 4.8–10.8)

## 2020-10-27 PROCEDURE — G0378 HOSPITAL OBSERVATION PER HR: HCPCS

## 2020-10-27 PROCEDURE — 2580000003 HC RX 258: Performed by: INTERNAL MEDICINE

## 2020-10-27 PROCEDURE — 83735 ASSAY OF MAGNESIUM: CPT

## 2020-10-27 PROCEDURE — 96372 THER/PROPH/DIAG INJ SC/IM: CPT

## 2020-10-27 PROCEDURE — 80053 COMPREHEN METABOLIC PANEL: CPT

## 2020-10-27 PROCEDURE — 1210000000 HC MED SURG R&B

## 2020-10-27 PROCEDURE — 36415 COLL VENOUS BLD VENIPUNCTURE: CPT

## 2020-10-27 PROCEDURE — 84100 ASSAY OF PHOSPHORUS: CPT

## 2020-10-27 PROCEDURE — 6360000002 HC RX W HCPCS: Performed by: INTERNAL MEDICINE

## 2020-10-27 PROCEDURE — 2500000003 HC RX 250 WO HCPCS: Performed by: INTERNAL MEDICINE

## 2020-10-27 PROCEDURE — 6370000000 HC RX 637 (ALT 250 FOR IP): Performed by: INTERNAL MEDICINE

## 2020-10-27 PROCEDURE — 74018 RADEX ABDOMEN 1 VIEW: CPT

## 2020-10-27 PROCEDURE — 96376 TX/PRO/DX INJ SAME DRUG ADON: CPT

## 2020-10-27 PROCEDURE — 85025 COMPLETE CBC W/AUTO DIFF WBC: CPT

## 2020-10-27 RX ORDER — POTASSIUM CHLORIDE 20 MEQ/1
40 TABLET, EXTENDED RELEASE ORAL ONCE
Status: COMPLETED | OUTPATIENT
Start: 2020-10-27 | End: 2020-10-27

## 2020-10-27 RX ORDER — FLUTICASONE PROPIONATE 50 MCG
2 SPRAY, SUSPENSION (ML) NASAL DAILY
Status: DISCONTINUED | OUTPATIENT
Start: 2020-10-27 | End: 2020-10-28 | Stop reason: HOSPADM

## 2020-10-27 RX ADMIN — FAMOTIDINE 20 MG: 10 INJECTION INTRAVENOUS at 08:18

## 2020-10-27 RX ADMIN — Medication 10 ML: at 21:31

## 2020-10-27 RX ADMIN — SODIUM CHLORIDE, POTASSIUM CHLORIDE, SODIUM LACTATE AND CALCIUM CHLORIDE: 600; 310; 30; 20 INJECTION, SOLUTION INTRAVENOUS at 12:54

## 2020-10-27 RX ADMIN — FAMOTIDINE 20 MG: 10 INJECTION INTRAVENOUS at 21:31

## 2020-10-27 RX ADMIN — POTASSIUM CHLORIDE 40 MEQ: 20 TABLET, EXTENDED RELEASE ORAL at 17:36

## 2020-10-27 RX ADMIN — ENOXAPARIN SODIUM 40 MG: 40 INJECTION SUBCUTANEOUS at 08:17

## 2020-10-27 RX ADMIN — Medication 10 ML: at 08:18

## 2020-10-27 ASSESSMENT — PAIN SCALES - GENERAL: PAINLEVEL_OUTOF10: 0

## 2020-10-27 NOTE — FLOWSHEET NOTE
Pt c/o intermittent \"stabbbing\" abdominal pain, declines pain medication. Bowel sounds active, passing gas, had large BM during the day.

## 2020-10-27 NOTE — PROGRESS NOTES
Patient is feeling a lot better. Complete resolution of the abdominal pain. Patient is tolerating her diet. No nausea or vomiting. No fever or chills. No hematemesis or melena. Patient had a multiple bowel movement. ROS: 12 system review otherwise is negative for acute signs or symptoms over the last 24 hrs. Exam: Awake, alert oriented, in no apparent distress  HEENT: Pink conjunctiva and buccal mucosa. Normal and throat and nose  Neck: Supple, no nuchal rigidity. Endo: No thyromegaly. Vascular: No JVD or carotid bruit. Chest: Clear to auscultation, no dullness to percussion. Heart: Regular rate and rhythm, no extra sounds. No murmur, no rub. Abdomen: Soft, multiple surgical scars. Resolution of the , no rebound, no rigidity. Clinically I cotendernessuld not exclude the possibility of intra-abdominal mass or organomegaly. LE: No cyanosis or clubbing, no varices or edema. Neuro: Awake, alert, oriented, normal speech, normal comprehension, normal extension, normal cranial nerves, normal and symmetrical motor and tone examination throughout. MS: No joint effusion or tenderness. Skin: No skin rash, itching, bruising or significant findings. Assessment and plan:     *Abdominal pain, nausea and vomiting, SBO is suspected. Other etiologies cannot be excluded at this time. Significant improvement in the resolution of her symptoms over the last 24 hours.     *History of colon cancer status post colectomy followed by chemotherapy.     *Vitamin D deficiency. *Hypokalemia. Plan:    Advance her diet. Reduced IV fluid. Check pending KUB. Potassium supplementation. Potential discharge on Wednesday if she continues to improve. I may or may not have addressed all of this pt symptoms, medical issues, abnormal labs and findings.  Pt will need additional work up, investigation, testing, surveillance, and treatment to be done at a later time and date during this hospitalization or post discharge by PCP and other out pt providers. Portion of patient care is managed by other providers. Please refer to their notes for details. I will follow specialists recommendations given their expertise in specialty subject matters. I will transfer patient to a tertiary care center if recommended by specialists. Further workup and plan will be determined based on the clinical progression and a follow-up tests result. Night and next week  hospitalist will take care of the patient in my absence.

## 2020-10-27 NOTE — PROGRESS NOTES
Assessment completed this shift. Alert and oriented x4. Denies nausea. Pt reports \"stabbing\" cramps  That she has infrequently. No pain at this time. Up with standby assist. To  xRay with transport.   Electronically signed by Ceferino Patel RN on 10/27/2020 at 8:30 AM

## 2020-10-28 VITALS
BODY MASS INDEX: 28.52 KG/M2 | OXYGEN SATURATION: 96 % | SYSTOLIC BLOOD PRESSURE: 173 MMHG | HEIGHT: 62 IN | RESPIRATION RATE: 18 BRPM | HEART RATE: 65 BPM | TEMPERATURE: 98.2 F | WEIGHT: 155 LBS | DIASTOLIC BLOOD PRESSURE: 66 MMHG

## 2020-10-28 LAB
ALBUMIN SERPL-MCNC: 3.9 G/DL (ref 3.5–4.6)
ALP BLD-CCNC: 99 U/L (ref 40–130)
ALT SERPL-CCNC: 56 U/L (ref 0–33)
ANION GAP SERPL CALCULATED.3IONS-SCNC: 8 MEQ/L (ref 9–15)
AST SERPL-CCNC: 38 U/L (ref 0–35)
BILIRUB SERPL-MCNC: 0.7 MG/DL (ref 0.2–0.7)
BUN BLDV-MCNC: 9 MG/DL (ref 8–23)
CALCIUM SERPL-MCNC: 9 MG/DL (ref 8.5–9.9)
CHLORIDE BLD-SCNC: 107 MEQ/L (ref 95–107)
CO2: 28 MEQ/L (ref 20–31)
CREAT SERPL-MCNC: 0.72 MG/DL (ref 0.5–0.9)
CULTURE, BLOOD 2: ABNORMAL
GFR AFRICAN AMERICAN: >60
GFR NON-AFRICAN AMERICAN: >60
GLOBULIN: 2.6 G/DL (ref 2.3–3.5)
GLUCOSE BLD-MCNC: 105 MG/DL (ref 70–99)
ORGANISM: ABNORMAL
ORGANISM: ABNORMAL
POTASSIUM SERPL-SCNC: 3.8 MEQ/L (ref 3.4–4.9)
SODIUM BLD-SCNC: 143 MEQ/L (ref 135–144)
TOTAL PROTEIN: 6.5 G/DL (ref 6.3–8)

## 2020-10-28 PROCEDURE — 2500000003 HC RX 250 WO HCPCS: Performed by: INTERNAL MEDICINE

## 2020-10-28 PROCEDURE — G0378 HOSPITAL OBSERVATION PER HR: HCPCS

## 2020-10-28 PROCEDURE — 6360000002 HC RX W HCPCS: Performed by: INTERNAL MEDICINE

## 2020-10-28 PROCEDURE — 96376 TX/PRO/DX INJ SAME DRUG ADON: CPT

## 2020-10-28 PROCEDURE — 80053 COMPREHEN METABOLIC PANEL: CPT

## 2020-10-28 PROCEDURE — 6370000000 HC RX 637 (ALT 250 FOR IP): Performed by: INTERNAL MEDICINE

## 2020-10-28 PROCEDURE — 2580000003 HC RX 258: Performed by: INTERNAL MEDICINE

## 2020-10-28 PROCEDURE — 36415 COLL VENOUS BLD VENIPUNCTURE: CPT

## 2020-10-28 PROCEDURE — 96372 THER/PROPH/DIAG INJ SC/IM: CPT

## 2020-10-28 RX ADMIN — FLUTICASONE PROPIONATE 2 SPRAY: 50 SPRAY, METERED NASAL at 09:30

## 2020-10-28 RX ADMIN — FAMOTIDINE 20 MG: 10 INJECTION INTRAVENOUS at 09:31

## 2020-10-28 RX ADMIN — ENOXAPARIN SODIUM 40 MG: 40 INJECTION SUBCUTANEOUS at 09:31

## 2020-10-28 RX ADMIN — FLUTICASONE PROPIONATE 2 SPRAY: 50 SPRAY, METERED NASAL at 00:14

## 2020-10-28 RX ADMIN — SODIUM CHLORIDE, POTASSIUM CHLORIDE, SODIUM LACTATE AND CALCIUM CHLORIDE: 600; 310; 30; 20 INJECTION, SOLUTION INTRAVENOUS at 03:46

## 2020-10-28 NOTE — PROGRESS NOTES
Pt in bed resting. assessment documented. She c/o sinus HA, messaged MD, new orders received, see MAR. No needs at this time. A&O*4, VSS, afebrile. . Will continue to monitor. Electronically signed by Lydna Westfall RN on 10/28/2020 at 12:38 AM

## 2020-10-28 NOTE — DISCHARGE SUMMARY
Hospital Medicine Discharge Summary    Rosanne Hart  :  1935  MRN:  [id number]    Admit date:  10/26/2020  Discharge date:  10/28/2020    Admitting Physician:  Nancy Rossi MD  Primary Care Physician:  Nish Wilkinson MD      Discharge Diagnoses:      *SBO: Suspected SBO, resolution of her symptoms.     *History of colon cancer status post colectomy followed by chemotherapy.     *Vitamin D deficiency.     *Hypokalemia. *Fluctuation in her blood pressure. Difficult to  if the patient needs BP meds. This has to be addressed in the outpatient setting. Patient does not want to take anything for it now. Hospital Course:   Rosanne Hart is a 82 y.o. female that was admitted and treated at [facility] with abdominal pain, nausea and vomiting. CAT scan showed small bowel obstruction. Patient has had complete resolution of her symptoms. Complete resolution of nausea, vomiting, abdominal pain. She was having daily bowel movement. She was able to tolerate her diet. She is requesting to be discharged home and not willing to stay any longer for observation or treatment. Her abdominal exam is completely benign. I will have any other option but to respect and follow her wishes to be discharged home. Patient has a history of colon cancer status post colectomy. I recommended the patient to follow-up with the primary care doctor and GI for colonoscopy and complete the needed work-up in the outpatient setting. I told her that this her symptoms and obstruction might recur anytime in the future and this could have happen as early as tomorrow morning. Slight elevation of the liver enzymes, unknown etiology or relevance or significance. This have to be monitored in the outpatient setting in the proceed with additional work-up if needed. 1 out of 2 blood cultures positive for staph epi. No fever, no leukocytosis, most likely contaminant.     Patient has multiple medical issues as listed above and others that are not listed. I do not have clear or strong clinical justification to extend inpatient hospitalization. Patient however would definitely need and require close and frequent monitoring as well as additional work-up, investigation and therapeutic intervention that potentially could take place in the outpatient setting by primary care doctor in collaboration with other specialists. That is to ensure appropriate recovery post discharge, complete work-up and treatment in the outpatient setting and to prevent relapse, decompensation, rehospitalization and other medical implications. Patient was seen by the following consultants while admitted to [facility]:   Consults:  Alanis    Significant Diagnostic Studies:    Xr Abdomen (kub) (single Ap View)    Result Date: 10/28/2020  EXAMINATION: XR ABDOMEN (KUB) (SINGLE AP VIEW) DATE AND TIME:10/27/2020 8:32 AM CLINICAL HISTORY: EPIGASTRIC PAIN   SBO  COMPARISON: NONE  FINDINGS Nonspecific distention of small bowel loops localized to the right lower abdomen not significantly changed in comparison to the previous topogram accompanying the CT scans from October 26, 2020. No sign of free air. NONSPECIFIC DISTENDED SMALL BOWEL LOOPS. Ct Abdomen Pelvis W Iv Contrast Additional Contrast? None    Result Date: 10/26/2020  EXAMINATION: CT ABDOMEN PELVIS W IV CONTRAST HISTORY:  pain  COMPARISON: CT abdomen and pelvis from June 23, 2020 TECHNIQUE: Contiguous axial CT sections of the abdomen and pelvis. Imaging was obtained after IV contrast administration. .  All CT scans at this facility use dose modulation, iterative reconstruction, and/or weight based dosing when appropriate to reduce radiation dose to as low as reasonably achievable. FINDINGS  Lung bases:Visualized lung bases show no significant pathology there appears to have been a prior right mastectomy. Liver:  The liver is normal in size and attenuation. There are no areas of abnormal enhancement. There is a persistent small hypodense lesion in the right lower liver unchanged since prior study. There is no intra or extrahepatic bile duct dilatation. Gallbladder: Patient is status post cholecystectomy. Spleen: There are no focal lesions or calcifications in the spleen. There is no splenomegaly  Pancreas: The pancreas is normal in size and attenuation without focal lesions or dilatation of the pancreatic duct. Kidneys: There are no solid renal lesions. There are prompt bilateral nephrograms after IV contrast administration with prompt excretion into nondilated collecting systems. There is no hydroureter. Adrenal glands are negative. Bowel: There are numerous distended air and fluid-filled loops of small bowel measuring up to 3.6 cm in greatest diameter. The terminal ileum is decompressed with a possible subtle transition of the right lower quadrant. There appears to have been a right colectomy with ileocolic reanastomosis in the right upper quadrant. There are numerous diverticula of the descending and sigmoid colon without surrounding inflammatory changes. Nodes: No lymphadenopathy. Aorta: No aneurysm  Peritoneum: No free fluid or free air. The abdominal wall is intact. Pelvis: There are no solid or cystic lesions. The previously noted mass in the urinary bladder is not apparent on the current study. There is increased density material layering in the urinary bladder which may represent debris, segments, or blood products. There are no definite wall abnormalities Bones: There are severe degenerative changes including levoscoliosis of the lumbar spine instability and severe degenerative changes of the left hip. 1. The findings are consistent with a partial small bowel obstruction.  There are dilated loops of proximal small bowel measuring up to 3.6 cm in greatest diameter with a positive ultrasonographic transition in PM  ----------------------------------------------------------------------------------------------------------------------    Venus Graves,     Please return to ER or call 911 if you develop any significant signs or symptoms.     I may not have addressed all of your medical illnesses or the abnormal blood work or imaging therefore please ask your PCP, Mac Melchor MD ,  to obtain [facility] record to follow up on all of the abnormal labs, imaging and findings that I have and have not addressed during your hospitalization.      Discharging you from the hospital does not mean that your medical care ends here and now. You may still need additional work up, investigation, monitoring, and treatment to be handled from this point on by outside providers including your PCP, Mac Melchor MD , Specialists and other healthcare providers.      Please review your list of discharge medications prior to resuming medications you might still have at home, as the medications you need to be taking, dosages or how often you must take them may have changed. For medication questions, contact your retail pharmacy and your PCP, Mac Melchor MD .     ** I STRONGLY RECOMMEND that you follow up with Mac Melchor MD within 3 to 5 days for a post hospitalization evaluation. This specific office visit is covered by your insurance, and is not the same as your annual doctor visit/ check up. This office visit is important, as it may prevent need for repeat and/or future hospitalizations. **    Your medical team at [facility] ([facility]) appreciates the opportunity to work with you to get well! Sincerely,  Freddie Franco Follow up with Dr. Mac Melchor MD in the next 7 days or sooner if needed. Please return to ER or call 911 if you develop any significant signs or symptoms.     I may or may not have addressed all of your symptoms, medical issues,  Illnesses, or all of the abnormal blood work or imaging therefore please ask your PCP and other specialists to obtain [facility] record entirely to follow up on all of your symptoms, illnesses, abnormal labs, imaging and findings that I have and have not addressed during your hospitalization. Discharging you from the hospital does not mean that your medical care ends here and now. You may still need to have additional work up, investigation, monitoring, surveillance, and treatment to be handled from this point on by in the out patient setting by  out patient providers including your PCP, Specialists and other healthcare providers. For medication questions, contact your retail pharmacy and your PCP. Your medical team at [facility] ([facility]) appreciates the opportunity to work with you to get well!     Domenic Sánchez MD

## 2020-10-31 LAB — BLOOD CULTURE, ROUTINE: NORMAL

## 2020-12-14 ENCOUNTER — PROCEDURE VISIT (OUTPATIENT)
Dept: UROLOGY | Age: 85
End: 2020-12-14
Payer: MEDICARE

## 2020-12-14 VITALS
SYSTOLIC BLOOD PRESSURE: 152 MMHG | HEIGHT: 63 IN | WEIGHT: 155 LBS | DIASTOLIC BLOOD PRESSURE: 80 MMHG | BODY MASS INDEX: 27.46 KG/M2 | HEART RATE: 81 BPM

## 2020-12-14 PROCEDURE — 81003 URINALYSIS AUTO W/O SCOPE: CPT | Performed by: UROLOGY

## 2020-12-14 PROCEDURE — 99999 PR OFFICE/OUTPT VISIT,PROCEDURE ONLY: CPT | Performed by: UROLOGY

## 2020-12-14 PROCEDURE — 52000 CYSTOURETHROSCOPY: CPT | Performed by: UROLOGY

## 2020-12-14 RX ORDER — SULFAMETHOXAZOLE AND TRIMETHOPRIM 800; 160 MG/1; MG/1
1 TABLET ORAL ONCE
Qty: 1 TABLET | Refills: 0 | COMMUNITY
Start: 2020-12-14 | End: 2020-12-14

## 2020-12-14 NOTE — PROGRESS NOTES
Urology  Patient here for surveillance cystoscopy  History of superficial bladder cancer  Repeat biopsy showed no evidence of malignancy        Procedure note    Flexible cystoscopy/local anesthetic/premedicated with Bactrim  Normal urethra  Normal bladder neck  Bilateral ureteral reflux clear  No evidence of mucosal lesions  No evidence of stones  No evidence of recurrent transitional cell carcinoma the bladder  Cystoscopy 6 months  Willis Lobato MD    Op note/pathology reports as below       [address], [address] [facility]                                 OPERATIVE REPORT     PATIENT NAME: Ozzy Cardona                   :        1935  MED REC NO:   [id number]                            ROOM:  ACCOUNT NO:   [de-identified]                           ADMIT DATE: 2020  PROVIDER:     Nish Piper MD     DATE OF PROCEDURE:  2020     PREOPERATIVE DIAGNOSIS:  Solitary sessile mass posterior bladder wall  greater than 5 cm in size.     POSTOPERATIVE DIAGNOSIS:  Solitary sessile mass posterior bladder wall  greater than 5 cm in size.     OPERATION:  Transurethral resection of bladder tumor.     SURGEON:  Nish Piper MD     ANESTHESIA:  General.     ESTIMATED BLOOD LOSS:  50 mL.     INDICATIONS:  The patient is an 80-year-old female who presented to the  office with gross hematuria. CT of the abdomen and pelvis with and  without contrast performed showed no evidence of upper tract lesions. However, the patient did have a filling defect in the bladder consistent  with polypoid lesion.   Cystoscopy in the office confirmed this to be a  posterior bladder wall lesion approximately 5 cm2 involving the tumor  and surrounding tissue with papillary lesions, satellite lesions in  general.  The patient will undergo resection with possible bilateral  retrograde pyelogram if needed during the procedure.     OPERATIVE PROCEDURE:  The patient received preoperative IV antibiotics. She was taken to the operating room, placed on the operating table in  the dorsal lithotomy position. She was placed under general anesthetic. She was prepped and draped in sterile fashion. Then a 20-Yemeni  cystoscope was used to perform a cystoscopy. Cystoscopic evaluation  once again showed the posterior bladder wall tumor. This was not a  papillary lesion and was sessile in nature. There  were multiple satellite lesions around the bladder, papillary in nature  not sessile. At this point, the ureters were inspected and they were  not  near the tumor. Therefore, it was decided not to do  retrograde pyelograms and/or placement of double-J stents. Then, a  resectoscope was used to perform a routine resection of the tumor. Deep  muscularis mucosal layers were removed. The patient has minimal  trabeculation. Therefore, the deep biopsies went through the wall of  the muscularis mucosa for an excellent biopsy. However, the patient  will need to go home with a Mares catheter for decompression for  additional 4 days prior to removal of Mares. Satellite lesions in and  around the sessile mass were then fulgurated for area greater than 5  cm2. At the end of the procedure, there was no bleeding. There was no  evidence of perforation. Mares catheter was inserted and the patient  was discharged to the recovery room in stable condition. Urine was  clear throughout the postoperative stay. She was sent home with  antibiotics.   She will return to the office on 2020 for Mares  catheter removal.           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     OPERATION:  Cystoscopy, biopsy of bladder with fulguration.     SURGEON:  Garland Khan MD     ANESTHESIA:  General  ebl 0     INDICATIONS:  The patient is an 79-year-old female, who underwent  initial resection of a papillary lesion in the bladder on 07/08/2020. At that time, the biopsy showed high-grade papillary urothelial  carcinoma invasive into the lamina propria. There was no muscularis  propria identified. The patient will undergo additional biopsy in the  area to see if there is any residual tumor.     OPERATIVE NOTE:  The patient received preoperative IV antibiotics. She  was taken to operating room and placed on the operating table in dorsal  lithotomy position. The patient was then placed under an anesthetic. She received preoperative IV antibiotics. Cystoscopy was performed. Once again, the area of scar was noted. Examination of the rest of the  bladder showed no other new lesions. A cold cup biopsy forceps was used  to perform some deep biopsies in and around this area. The area was  then fulgurated. The patient was discharged to recovery room in stable  condition. The patient tolerated the procedure well.   There were no  complications.           Reji Vaz MD     D: 09/09/2020 11:52:37       T: 09/09/2020 12:01:59     [id number]  Job#: [id number]     Doc#: [id number]     CC:  [facility]                                        [place] [id number]                                        [phone]   FINAL SURGICAL PATHOLOGY REPORT Patient Name: Hallie Jay        Accession No:  [id number]    Age Sex:   1935                   Location:      [facility]   Account No:   [de-identified]                  Collected:     2020   [facility] Rec No:    [id number]                   Received:      2020   Attend Phys:   MARINA RIOS                Completed:     2020   Perform Phys: Neptali Hawkins           FINAL DIAGNOSIS:   BLADDER TUMOR:   INVASIVE UROTHELIAL CARCINOMA       PROCEDURE: TURBT   TUMOR SITE: NOT SPECIFIED   HISTOLOGIC TYPE: PAPILLARY UROTHELIAL CARCINOMA, INVASIVE, FOCAL INVERTED   PATTERN   ASSOCIATED EPITHELIAL LESIONS: NONE IDENTIFIED   HISTOLOGIC GRADE: HIGH GRADE   TUMOR CONFIGURATION: PAPILLARY, INVERTED   MUSCULARIS PROPRIA PRESENCE: NO MUSCULARIS PROPRIA IDENTIFIED   LYMPHOVASCULAR INVASION: PRESENT   TUMOR EXTENSION: TUMOR INVADES LAMINA PROPRIA         COMMENT:  DOC BENAVIDES AND YURI HAVE REVIEWED THIS CASE AND CONCUR. THE FINDINGS WERE DISCUSSED WITH RAND FOR  [facility] 7/10/20Sam Sears       CLINICAL INFORMATION:   Procedure: Cystoscopy, TURBT. Preop diagnosis: Bladder tumor. SPECIMEN:   Bladder Tumor     GROSS DESCRIPTION:   Received is one container labeled with the patient's name and designated   \"bladder\". The specimen consists of portions of pink, somewhat papillary   tissue, 3 x 2.4 x 0.5 cm in aggregate.  The specimen is submitted in toto   in two cassettes.      SHILPA             CPT: 56189 Shaila Lopez M.D.  2020    Electronically signed out by     [address]                                   [id number]   [place], [place]  [address] [phone]   FINAL SURGICAL PATHOLOGY REPORT   Patient Name: Hallie Jay          Accession No:  [id number]    Age Sex:   1935                   Location:      [facility] Account No:   [de-identified]                  Collected:     09/09/2020   Med Rec No:    [id number]                   Received:      09/09/2020   Attend Phys:   MAR[facility]                Completed:     09/11/2020   Perform Phys: Carson Peng           FINAL DIAGNOSIS:   BLADDER BIOPSY:   GRANULOMATOUS CYSTITIS   NO EVIDENCE OF CARCINOMA       COMMENT: AFB AND GMS STAINS SHOW NO EVIDENCE OF ACID-FAST BACILLUS OR   FUNGAL ORGANISMS RESPECTIVELY. AFB AND GMS CONTROLS ARE APPROPRIATE.  THE   GRANULOMATOUS INFLAMMATION MAY BE RELATED TO TREATMENT.  CLINICAL   CORRELATION IS NECESSARY.      JEAN MARIE/CYNTHIA       CLINICAL INFORMATION:   Bladder cancer. Bladder biopsy. SPECIMEN:   Bladder Biopsy     GROSS DESCRIPTION:   Specimen received in formalin in a container labeled with patient's name   and designated as Dash Mazariegos".  Specimen consists of three pinkish-tan   biopsy fragments measuring up to 2 mm in greatest dimension, submitted in   toto in cassette A1.      CYNTHIA/TARA             CPT: 75676 X1   [id number] Jessica Fountain M.D.  09/11/2020    Electronically signed out by

## 2021-05-03 DIAGNOSIS — Z01.818 ENCOUNTER FOR PREADMISSION TESTING: Primary | ICD-10-CM

## 2021-05-10 ENCOUNTER — NURSE ONLY (OUTPATIENT)
Dept: PRIMARY CARE CLINIC | Age: 86
End: 2021-05-10

## 2021-05-10 DIAGNOSIS — Z01.818 ENCOUNTER FOR PREADMISSION TESTING: ICD-10-CM

## 2021-05-11 LAB — SARS-COV-2, PCR: NOT DETECTED

## 2021-05-17 ENCOUNTER — ANESTHESIA EVENT (OUTPATIENT)
Dept: ENDOSCOPY | Age: 86
End: 2021-05-17
Payer: MEDICARE

## 2021-05-17 ENCOUNTER — ANESTHESIA (OUTPATIENT)
Dept: ENDOSCOPY | Age: 86
End: 2021-05-17
Payer: MEDICARE

## 2021-05-17 ENCOUNTER — HOSPITAL ENCOUNTER (OUTPATIENT)
Age: 86
Setting detail: OUTPATIENT SURGERY
Discharge: HOME | End: 2021-05-17
Attending: SPECIALIST | Admitting: SPECIALIST
Payer: MEDICARE

## 2021-05-17 ENCOUNTER — ANCILLARY PROCEDURE (OUTPATIENT)
Dept: ENDOSCOPY | Age: 86
End: 2021-05-17
Attending: SPECIALIST
Payer: MEDICARE

## 2021-05-17 VITALS
OXYGEN SATURATION: 97 % | SYSTOLIC BLOOD PRESSURE: 128 MMHG | RESPIRATION RATE: 16 BRPM | HEIGHT: 65 IN | TEMPERATURE: 98 F | BODY MASS INDEX: 25.33 KG/M2 | WEIGHT: 152 LBS | DIASTOLIC BLOOD PRESSURE: 60 MMHG | HEART RATE: 65 BPM

## 2021-05-17 VITALS
OXYGEN SATURATION: 96 % | DIASTOLIC BLOOD PRESSURE: 57 MMHG | RESPIRATION RATE: 14 BRPM | SYSTOLIC BLOOD PRESSURE: 109 MMHG

## 2021-05-17 PROCEDURE — 88305 TISSUE EXAM BY PATHOLOGIST: CPT

## 2021-05-17 PROCEDURE — 2580000003 HC RX 258: Performed by: SPECIALIST

## 2021-05-17 PROCEDURE — 2709999900 HC NON-CHARGEABLE SUPPLY: Performed by: SPECIALIST

## 2021-05-17 PROCEDURE — 45380 COLONOSCOPY AND BIOPSY: CPT | Performed by: SPECIALIST

## 2021-05-17 PROCEDURE — 3609027000 HC COLONOSCOPY: Performed by: SPECIALIST

## 2021-05-17 PROCEDURE — 2580000003 HC RX 258

## 2021-05-17 PROCEDURE — 3700000000 HC ANESTHESIA ATTENDED CARE: Performed by: SPECIALIST

## 2021-05-17 PROCEDURE — 7100000010 HC PHASE II RECOVERY - FIRST 15 MIN: Performed by: SPECIALIST

## 2021-05-17 PROCEDURE — 6370000000 HC RX 637 (ALT 250 FOR IP): Performed by: SPECIALIST

## 2021-05-17 PROCEDURE — 3700000001 HC ADD 15 MINUTES (ANESTHESIA): Performed by: SPECIALIST

## 2021-05-17 RX ORDER — SODIUM CHLORIDE 9 MG/ML
INJECTION, SOLUTION INTRAVENOUS
Status: COMPLETED
Start: 2021-05-17 | End: 2021-05-17

## 2021-05-17 RX ORDER — SODIUM CHLORIDE 9 MG/ML
INJECTION, SOLUTION INTRAVENOUS CONTINUOUS
Status: DISCONTINUED | OUTPATIENT
Start: 2021-05-17 | End: 2021-05-17 | Stop reason: HOSPADM

## 2021-05-17 RX ORDER — PROPOFOL 10 MG/ML
INJECTION, EMULSION INTRAVENOUS CONTINUOUS PRN
Status: DISCONTINUED | OUTPATIENT
Start: 2021-05-17 | End: 2021-05-17 | Stop reason: SDUPTHER

## 2021-05-17 RX ORDER — MAGNESIUM HYDROXIDE 1200 MG/15ML
LIQUID ORAL PRN
Status: DISCONTINUED | OUTPATIENT
Start: 2021-05-17 | End: 2021-05-17 | Stop reason: ALTCHOICE

## 2021-05-17 RX ADMIN — SODIUM CHLORIDE: 9 INJECTION, SOLUTION INTRAVENOUS at 10:21

## 2021-05-17 RX ADMIN — SODIUM CHLORIDE: 9 INJECTION, SOLUTION INTRAVENOUS at 10:30

## 2021-05-17 RX ADMIN — PROPOFOL 120 MCG/KG/MIN: 10 INJECTION, EMULSION INTRAVENOUS at 10:33

## 2021-05-17 ASSESSMENT — PAIN - FUNCTIONAL ASSESSMENT: PAIN_FUNCTIONAL_ASSESSMENT: 0-10

## 2021-05-17 ASSESSMENT — PAIN SCALES - GENERAL: PAINLEVEL_OUTOF10: 0

## 2021-05-17 NOTE — ANESTHESIA PRE PROCEDURE
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    Seasonal allergies J30.2    Blood in stool K92.1    Gastric ulcer without hemorrhage, perforation, or obstruction K25.9    Personal history of colon cancer Z85.038    Diverticulosis of large intestine without diverticulitis K57.30    Bladder tumor D49.4    Vitamin D deficiency E55.9    Tobacco use disorder F17.200    Malignant neoplasm of posterior wall of urinary bladder (HCC) C67.4    Bladder cancer ([address] C67.9    History of bladder cancer Z85.51    SBO (small bowel obstruction) ([address] K56.609       Past Medical History:        Diagnosis Date    Allergic rhinitis     Arthritis     Breast cancer ([address]     RT BREAST--dx 1986--oral chemo    Cecal cancer ([address]     dx 2002--chemo    Diverticulitis     HTN (hypertension)     meds > 20 yrs    Hypercholesterolemia     past trx with meds -- off meds > 5 yrs    Seasonal allergies     Small bowel obstruction ([address]        Past Surgical History:        Procedure Laterality Date    APPENDECTOMY      age 37s   [facility] BREAST SURGERY Right 1987    mastectomy    CATARACT REMOVAL      CHOLECYSTECTOMY  1970    COLONOSCOPY  4/09 & 1/11    COLONOSCOPY  1/9/13,1/10/11          COLONOSCOPY  1/21/15    polypectomy     CYSTOSCOPY N/A 7/8/2020    CYSTOSCOPY TURBT RESECTOSCOPE performed by Ashley Doshi MD at [place] [address] N/A 9/9/2020    CYSTOSCOPY BLADDER BIOPSY FULGURATION performed by Ashley Doshi MD at [facility]. [address], COLON, DIAGNOSTIC      EYE SURGERY      Phaco with IOL OU    EYE SURGERY      Lasix OU    HERNIA REPAIR Right 12/11    ING. HERNIA REPAIN W/ MESH    MASTECTOMY, MODIFIED RADICAL Right 1987    OTHER SURGICAL HISTORY      EXC.  BACK SKIN CYST    OTHER SURGICAL HISTORY  12/15/2009    PORT REMOVAL    [place] COLONOSCOPY FLX DX W/COLLJ SPEC WHEN PFRMD N/A 3/30/2018 COLONOSCOPY performed by Willis Thompson MD at [facility] [address] ESOPHAGOGASTRODUODENOSCOPY TRANSORAL DIAGNOSTIC N/A 3/30/2018    EGD ESOPHAGOGASTRODUODENOSCOPY performed by Willis Thompson MD at [facility]  04/28/09    CECAL CANCER    TUNNELED VENOUS PORT PLACEMENT  06/11/09    REMOVAL 12/09       Social History:    Social History     Tobacco Use    Smoking status: Current Every Day Smoker     Packs/day: 0.25     Years: 50.00     Pack years: 12.50     Types: Cigarettes    Smokeless tobacco: Never Used    Tobacco comment: 1/2 pack per week--past 1 ppd   Substance Use Topics    Alcohol use: No                                Ready to quit: Not Answered  Counseling given: Not Answered  Comment: 1/2 pack per week--past 1 ppd      Vital Signs (Current):   Vitals:    05/17/21 0958   BP: (!) 141/64   Pulse: 80   Resp: 16   Temp: 36.7 °C (98 °F)   TempSrc: Temporal   SpO2: 96%   Weight: 152 lb (68.9 kg)   Height: 5' 5\" (1.651 m)                                              BP Readings from Last 3 Encounters:   05/17/21 (!) 141/64   12/14/20 (!) 152/80   10/28/20 (!) 173/66       NPO Status:                                                                                 BMI:   Wt Readings from Last 3 Encounters:   05/17/21 152 lb (68.9 kg)   12/14/20 155 lb (70.3 kg)   10/26/20 155 lb (70.3 kg)     Body mass index is 25.29 kg/m².     CBC:   Lab Results   Component Value Date    WBC 5.8 10/27/2020    RBC 3.82 10/27/2020    RBC 3.73 01/01/2012    HGB 12.3 10/27/2020    HCT 36.6 10/27/2020    MCV 95.7 10/27/2020    RDW 13.5 10/27/2020     10/27/2020       CMP:   Lab Results   Component Value Date     10/28/2020    K 3.8 10/28/2020     10/28/2020    CO2 28 10/28/2020    BUN 9 10/28/2020    CREATININE 0.72 10/28/2020    GFRAA >60.0 10/28/2020    LABGLOM >60.0 10/28/2020    GLUCOSE 105 10/28/2020    GLUCOSE 138 01/01/2012    PROT 6.5 10/28/2020    CALCIUM 9.0 10/28/2020    BILITOT 0.7 10/28/2020    ALKPHOS 99 10/28/2020    AST 38 10/28/2020    ALT 56 10/28/2020       POC Tests: No results for input(s): POCGLU, POCNA, POCK, POCCL, POCBUN, POCHEMO, POCHCT in the last 72 hours. Coags:   Lab Results   Component Value Date    PROTIME 10.1 11/10/2013    INR 1.0 11/10/2013    APTT 32.8 11/10/2013       HCG (If Applicable): No results found for: PREGTESTUR, PREGSERUM, HCG, HCGQUANT     ABGs: No results found for: PHART, PO2ART, YBZ2PPB, SPG4WAW, BEART, O7GGWFQX     Type & Screen (If Applicable):  No results found for: LABABO, LABRH    Drug/Infectious Status (If Applicable):  No results found for: HIV, HEPCAB    COVID-19 Screening (If Applicable):   Lab Results   Component Value Date    COVID19 Not Detected 05/10/2021           Anesthesia Evaluation  Patient summary reviewed and Nursing notes reviewed  Airway: Mallampati: II  TM distance: >3 FB   Neck ROM: full  Mouth opening: > = 3 FB Dental: normal exam         Pulmonary:                              Cardiovascular:                      Neuro/Psych:               GI/Hepatic/Renal:             Endo/Other:                     Abdominal:           Vascular:                                        Anesthesia Plan      MAC     ASA 2             Anesthetic plan and risks discussed with patient. Use of blood products discussed with patient whom consented to blood products.                    SUSY Bui CRNA   5/17/2021

## 2021-05-17 NOTE — H&P
Patient Name: Kirsten Min  : 1935  MRN: [id number]  DATE: 21      ENDOSCOPY  History and Physical    Procedure:    [x] Diagnostic Colonoscopy       [] Screening Colonoscopy  [] EGD      [] ERCP      [] EUS       [] Other    [x] Previous office notes/History and Physical reviewed from the patients chart. Please see EMR for further details of HPI. I have examined the patient's status immediately prior to the procedure and:      Indications/HPI:    []Abdominal Pain  []Cancer- GI/Lung  []Fhx of colon CA/polyps  []History of Polyps  []Diamonds   []Melena  []Abnormal Imaging  []Dysphagia    []Persistent Pneumonia  []Anemia  []Food Impaction  []History of Polyps  []GI Bleed  []Pulmonary nodule/Mass  []Change in bowel habits []Heartburn/Reflux  []Rectal Bleed (BRBPR)  []Chest Pain - Non Cardiac []Heme (+) Stoo  l[]Ulcers  []Constipation  []Hemoptysis   []Varices  []Diarrhea  []Hypoxemia  []Nausea/Vomiting  []Screening   []Crohns/Colitis  []Other: h/o colon CA s/p resection. Anesthesia:   [x] MAC [] Moderate Sedation   [] General   [] None     ROS: 12 pt Review of Symptoms was negative unless mentioned above    Medications:   Prior to Admission medications    Medication Sig Start Date End Date Taking? Authorizing Provider   Multiple Vitamins-Minerals (PRESERVISION AREDS PO) Take by mouth   Yes Historical Provider, MD   b complex vitamins capsule Take 1 capsule by mouth daily   Yes Historical Provider, MD   Cholecalciferol (VITAMIN D3) 50 MCG (2000 UT) CAPS Take by mouth daily   Yes Historical Provider, MD   Omega-3 Fatty Acids (FISH OIL) 1200 MG CAPS Take by mouth daily   Yes Historical Provider, MD   traMADol (ULTRAM) 50 MG tablet Take 25 mg by mouth every 8 hours.   6/10/20  Yes Historical Provider, MD   Probiotic Product ([address] PO) Take 1 capsule by mouth every evening    Yes Historical Provider, MD   Loratadine (CLARITIN PO) Take 1 tablet by mouth daily    Yes Historical Provider, MD triamterene-hydrochlorothiazide (MAXZIDE-25) 37.5-25 MG per tablet Take 1 tablet by mouth daily. Yes Historical Provider, MD   polyethylene glycol (GLYCOLAX) 17 GM/SCOOP powder Take 17 g by mouth daily    Historical Provider, MD       Allergies: Allergies   Allergen Reactions    Bacitracin Swelling    Seasonal      Sinus sx    Statins      Abdominal pain        History of allergic reaction to anesthesia:  No    Past Medical History:  Past Medical History:   Diagnosis Date    Allergic rhinitis     Arthritis     Breast cancer ([facility] [address]     RT BREAST--dx 1986--oral chemo    Cecal cancer ([facility] [address]     dx 2002--chemo    Diverticulitis     HTN (hypertension)     meds > 20 yrs    Hypercholesterolemia     past trx with meds -- off meds > 5 yrs    Seasonal allergies     Small bowel obstruction ([facility] [address]        Past Surgical History:  Past Surgical History:   Procedure Laterality Date    APPENDECTOMY      age 31s    BREAST SURGERY Right 1987    mastectomy   [facility] COLONOSCOPY  4/09 & 1/11    COLONOSCOPY  1/9/13,1/10/11          COLONOSCOPY  1/21/15    polypectomy     CYSTOSCOPY N/A 7/8/2020    CYSTOSCOPY TURBT RESECTOSCOPE performed by Can Almanza MD at [place] [address] N/A 9/9/2020    CYSTOSCOPY BLADDER BIOPSY FULGURATION performed by Can Almanza MD at [facility]. [address], COLON, DIAGNOSTIC      EYE SURGERY      Phaco with IOL OU    EYE SURGERY      Lasix OU    HERNIA REPAIR Right 12/11    ING. HERNIA REPAIN W/ MESH    MASTECTOMY, MODIFIED RADICAL Right 1987    OTHER SURGICAL HISTORY      EXC.  BACK SKIN CYST    OTHER SURGICAL HISTORY  12/15/2009    PORT REMOVAL    [place] COLONOSCOPY FLX DX W/COLLJ SPEC WHEN PFRMD N/A 3/30/2018    COLONOSCOPY performed by Paola Dowling MD at [address] ESOPHAGOGASTRODUODENOSCOPY TRANSORAL DIAGNOSTIC N/A 3/30/2018    EGD ESOPHAGOGASTRODUODENOSCOPY performed by Dalia Morton MD at [facility]  04/28/09    CECAL CANCER    TUNNELED VENOUS PORT PLACEMENT  06/11/09    REMOVAL 12/09       Social History:  Social History     Tobacco Use    Smoking status: Current Every Day Smoker     Packs/day: 0.25     Years: 50.00     Pack years: 12.50     Types: Cigarettes    Smokeless tobacco: Never Used    Tobacco comment: 1/2-1 pack per week--past 1 ppd   Vaping Use    Vaping Use: Never used   Substance Use Topics    Alcohol use: No    Drug use: Never       Vital Signs:   Vitals:    05/17/21 0958   BP: (!) 141/64   Pulse: 80   Resp: 16   Temp: 98 °F (36.7 °C)   SpO2: 96%        Physical Exam:  Cardiac:  [x]WNL  []Comments:  Pulmonary:  [x]WNL   []Comments:   Neuro/Mental Status:  [x]WNL  []Comments:  Abdominal:  [x]WNL    []Comments:  Other:   []WNL  []Comments:    Informed Consent:  The risks and benefits of the procedure have been discussed with either the patient or if they cannot consent, their representative. Assessment:  Patient examined and appropriate for planned sedation and procedure. Plan:  Proceed with planned sedation and procedure as above.     Sha Alonso MD  10:32 AM

## 2021-05-17 NOTE — ANESTHESIA POSTPROCEDURE EVALUATION
Department of Anesthesiology  Postprocedure Note    Patient: Marcy Templeton  MRN: [id number]  YOB: 1935  Date of evaluation: 5/17/2021  Time:  10:53 AM     Procedure Summary     Date: 05/17/21 Room / Location: [facility] OR 01 / [facility]    Anesthesia Start: 1030 Anesthesia Stop: 1053    Procedure: COLORECTAL CANCER SCREENING, HIGH RISK (N/A ) Diagnosis: (History of colon ca)    Surgeons: Katey Olivas MD Responsible Provider: SUSY David CRNA    Anesthesia Type: MAC ASA Status: 2          Anesthesia Type: MAC    Dorian Phase I: Dorian Score: 10    Dorian Phase II:      Last vitals: Reviewed and per EMR flowsheets.        Anesthesia Post Evaluation    Patient location during evaluation: PACU  Patient participation: complete - patient participated  Level of consciousness: awake and alert  Pain score: 0  Airway patency: patent  Nausea & Vomiting: no nausea and no vomiting  Complications: no  Cardiovascular status: blood pressure returned to baseline and hemodynamically stable  Respiratory status: acceptable, spontaneous ventilation, nonlabored ventilation and nasal cannula  Hydration status: euvolemic

## 2021-06-21 ENCOUNTER — HOSPITAL ENCOUNTER (OUTPATIENT)
Dept: WOMENS IMAGING | Age: 86
Discharge: HOME | End: 2021-06-23
Payer: MEDICARE

## 2021-06-21 DIAGNOSIS — Z12.31 VISIT FOR SCREENING MAMMOGRAM: ICD-10-CM

## 2021-06-21 PROCEDURE — 77063 BREAST TOMOSYNTHESIS BI: CPT

## 2021-06-28 ENCOUNTER — PROCEDURE VISIT (OUTPATIENT)
Dept: UROLOGY | Age: 86
End: 2021-06-28
Payer: MEDICARE

## 2021-06-28 VITALS
SYSTOLIC BLOOD PRESSURE: 158 MMHG | DIASTOLIC BLOOD PRESSURE: 80 MMHG | HEIGHT: 64 IN | BODY MASS INDEX: 26.46 KG/M2 | HEART RATE: 89 BPM | WEIGHT: 155 LBS

## 2021-06-28 DIAGNOSIS — D49.4 BLADDER TUMOR: Primary | ICD-10-CM

## 2021-06-28 LAB
BILIRUBIN, POC: ABNORMAL
BLOOD URINE, POC: ABNORMAL
CLARITY, POC: CLEAR
COLOR, POC: YELLOW
GLUCOSE URINE, POC: ABNORMAL
KETONES, POC: ABNORMAL
LEUKOCYTE EST, POC: ABNORMAL
NITRITE, POC: ABNORMAL
PH, POC: 7
PROTEIN, POC: ABNORMAL
SPECIFIC GRAVITY, POC: 1.01
UROBILINOGEN, POC: 0.2

## 2021-06-28 PROCEDURE — 52000 CYSTOURETHROSCOPY: CPT | Performed by: UROLOGY

## 2021-06-28 PROCEDURE — 81003 URINALYSIS AUTO W/O SCOPE: CPT | Performed by: UROLOGY

## 2021-06-28 RX ORDER — SULFAMETHOXAZOLE AND TRIMETHOPRIM 800; 160 MG/1; MG/1
1 TABLET ORAL ONCE
Qty: 1 TABLET | Refills: 0 | COMMUNITY
Start: 2021-06-28 | End: 2021-06-28

## 2021-06-28 NOTE — PROGRESS NOTES
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

## 2021-11-15 ENCOUNTER — HOSPITAL ENCOUNTER (OUTPATIENT)
Dept: LAB | Age: 86
Discharge: HOME | End: 2021-11-15
Payer: MEDICARE

## 2021-11-15 LAB
ALBUMIN SERPL-MCNC: 4.2 G/DL (ref 3.5–4.6)
ALP BLD-CCNC: 110 U/L (ref 40–130)
ALT SERPL-CCNC: 15 U/L (ref 0–33)
ANION GAP SERPL CALCULATED.3IONS-SCNC: 14 MEQ/L (ref 9–15)
AST SERPL-CCNC: 22 U/L (ref 0–35)
BILIRUB SERPL-MCNC: 0.6 MG/DL (ref 0.2–0.7)
BUN BLDV-MCNC: 16 MG/DL (ref 8–23)
CALCIUM SERPL-MCNC: 9.8 MG/DL (ref 8.5–9.9)
CHLORIDE BLD-SCNC: 102 MEQ/L (ref 95–107)
CO2: 25 MEQ/L (ref 20–31)
CREAT SERPL-MCNC: 0.76 MG/DL (ref 0.5–0.9)
GFR AFRICAN AMERICAN: >60
GFR NON-AFRICAN AMERICAN: >60
GLOBULIN: 3.7 G/DL (ref 2.3–3.5)
GLUCOSE BLD-MCNC: 105 MG/DL (ref 70–99)
HCT VFR BLD CALC: 43 % (ref 37–47)
HEMOGLOBIN: 14.6 G/DL (ref 12–16)
MCH RBC QN AUTO: 32 PG (ref 27–31.3)
MCHC RBC AUTO-ENTMCNC: 34 % (ref 33–37)
MCV RBC AUTO: 94.1 FL (ref 82–100)
PDW BLD-RTO: 13.7 % (ref 11.5–14.5)
PLATELET # BLD: 234 K/UL (ref 130–400)
POTASSIUM SERPL-SCNC: 3.7 MEQ/L (ref 3.4–4.9)
RBC # BLD: 4.57 M/UL (ref 4.2–5.4)
SODIUM BLD-SCNC: 141 MEQ/L (ref 135–144)
TOTAL PROTEIN: 7.9 G/DL (ref 6.3–8)
WBC # BLD: 3.9 K/UL (ref 4.8–10.8)

## 2021-11-15 PROCEDURE — 85027 COMPLETE CBC AUTOMATED: CPT

## 2021-11-15 PROCEDURE — 36415 COLL VENOUS BLD VENIPUNCTURE: CPT

## 2021-11-15 PROCEDURE — 80053 COMPREHEN METABOLIC PANEL: CPT

## 2021-12-27 ENCOUNTER — PROCEDURE VISIT (OUTPATIENT)
Dept: UROLOGY | Age: 86
End: 2021-12-27
Payer: MEDICARE

## 2021-12-27 VITALS
HEART RATE: 85 BPM | BODY MASS INDEX: 25.95 KG/M2 | SYSTOLIC BLOOD PRESSURE: 130 MMHG | HEIGHT: 64 IN | WEIGHT: 152 LBS | OXYGEN SATURATION: 98 % | DIASTOLIC BLOOD PRESSURE: 68 MMHG

## 2021-12-27 DIAGNOSIS — Z85.51 HISTORY OF BLADDER CANCER: Primary | ICD-10-CM

## 2021-12-27 PROCEDURE — 52000 CYSTOURETHROSCOPY: CPT | Performed by: UROLOGY

## 2021-12-27 RX ORDER — SULFAMETHOXAZOLE AND TRIMETHOPRIM 800; 160 MG/1; MG/1
1 TABLET ORAL ONCE
Qty: 1 TABLET | Refills: 0 | COMMUNITY
Start: 2021-12-27 | End: 2021-12-27

## 2021-12-27 NOTE — PROGRESS NOTES
Urology  Patient here for surveillance cystoscopy  History of solitary lesion with repeat biopsy showed no evidence of residual disease  Here for surveillance cystoscopy      Procedure note  Flexible cystoscopy/local anesthetic/premedicated with antibiotic  Normal urethra  Recurrent lesion above the right ureteral orifice  Patient will be scheduled for bladder biopsy with fulguration and bilateral retrograde pyelograms  Patient continues to smoke  Follow instructions given  SULAIMAN [place], MD

## 2022-01-04 ENCOUNTER — HOSPITAL ENCOUNTER (OUTPATIENT)
Dept: PREADMISSION TESTING | Age: 87
Discharge: HOME | End: 2022-01-08
Payer: MEDICARE

## 2022-01-04 VITALS
DIASTOLIC BLOOD PRESSURE: 71 MMHG | RESPIRATION RATE: 16 BRPM | HEART RATE: 78 BPM | SYSTOLIC BLOOD PRESSURE: 161 MMHG | OXYGEN SATURATION: 98 % | HEIGHT: 61 IN | BODY MASS INDEX: 28.4 KG/M2 | TEMPERATURE: 98 F | WEIGHT: 150.4 LBS

## 2022-01-04 LAB
ANION GAP SERPL CALCULATED.3IONS-SCNC: 10 MEQ/L (ref 9–15)
BUN BLDV-MCNC: 17 MG/DL (ref 8–23)
CALCIUM SERPL-MCNC: 9.6 MG/DL (ref 8.5–9.9)
CHLORIDE BLD-SCNC: 99 MEQ/L (ref 95–107)
CO2: 32 MEQ/L (ref 20–31)
CREAT SERPL-MCNC: 0.88 MG/DL (ref 0.5–0.9)
GFR AFRICAN AMERICAN: >60
GFR NON-AFRICAN AMERICAN: >60
GLUCOSE BLD-MCNC: 111 MG/DL (ref 70–99)
HCT VFR BLD CALC: 44.3 % (ref 37–47)
HEMOGLOBIN: 14.8 G/DL (ref 12–16)
MCH RBC QN AUTO: 31.9 PG (ref 27–31.3)
MCHC RBC AUTO-ENTMCNC: 33.5 % (ref 33–37)
MCV RBC AUTO: 95.2 FL (ref 82–100)
PDW BLD-RTO: 13.5 % (ref 11.5–14.5)
PLATELET # BLD: 213 K/UL (ref 130–400)
POTASSIUM SERPL-SCNC: 4 MEQ/L (ref 3.4–4.9)
RBC # BLD: 4.65 M/UL (ref 4.2–5.4)
SODIUM BLD-SCNC: 141 MEQ/L (ref 135–144)
WBC # BLD: 4.1 K/UL (ref 4.8–10.8)

## 2022-01-04 PROCEDURE — 80048 BASIC METABOLIC PNL TOTAL CA: CPT

## 2022-01-04 PROCEDURE — 93005 ELECTROCARDIOGRAM TRACING: CPT | Performed by: NURSE PRACTITIONER

## 2022-01-04 PROCEDURE — 85027 COMPLETE CBC AUTOMATED: CPT

## 2022-01-04 RX ORDER — SODIUM CHLORIDE, SODIUM LACTATE, POTASSIUM CHLORIDE, CALCIUM CHLORIDE 600; 310; 30; 20 MG/100ML; MG/100ML; MG/100ML; MG/100ML
INJECTION, SOLUTION INTRAVENOUS CONTINUOUS
Status: CANCELLED | OUTPATIENT
Start: 2022-01-12

## 2022-01-04 RX ORDER — LIDOCAINE HYDROCHLORIDE 10 MG/ML
1 INJECTION, SOLUTION EPIDURAL; INFILTRATION; INTRACAUDAL; PERINEURAL
Status: CANCELLED | OUTPATIENT
Start: 2022-01-12 | End: 2022-01-12

## 2022-01-04 RX ORDER — SODIUM CHLORIDE 0.9 % (FLUSH) 0.9 %
10 SYRINGE (ML) INJECTION PRN
Status: CANCELLED | OUTPATIENT
Start: 2022-01-12

## 2022-01-04 RX ORDER — SODIUM CHLORIDE 0.9 % (FLUSH) 0.9 %
10 SYRINGE (ML) INJECTION EVERY 12 HOURS SCHEDULED
Status: CANCELLED | OUTPATIENT
Start: 2022-01-12

## 2022-01-04 RX ORDER — GENTAMICIN SULFATE 80 MG/100ML
80 INJECTION, SOLUTION INTRAVENOUS ONCE
Status: CANCELLED | OUTPATIENT
Start: 2022-01-12

## 2022-01-04 RX ORDER — SODIUM CHLORIDE 9 MG/ML
25 INJECTION, SOLUTION INTRAVENOUS PRN
Status: CANCELLED | OUTPATIENT
Start: 2022-01-12

## 2022-01-04 ASSESSMENT — ENCOUNTER SYMPTOMS
SORE THROAT: 0
NAUSEA: 0
COUGH: 0
ABDOMINAL PAIN: 0
DIARRHEA: 0
CHEST TIGHTNESS: 0
WHEEZING: 0
CONSTIPATION: 1
ALLERGIC/IMMUNOLOGIC NEGATIVE: 1
VOMITING: 0
EYES NEGATIVE: 1
STRIDOR: 0
BACK PAIN: 1
SHORTNESS OF BREATH: 0

## 2022-01-04 NOTE — H&P
Nurse Practitioner History and Physical      CHIEF COMPLAINT:  Bladder tumor    HISTORY OF PRESENT ILLNESS:      The patient is a 82 y.o. female with significant past medical history of bladder tumor who presents for cystoscopy, bilateral  pyelogram, biopsy of bladder. Surveillance cystoscopy identified bladder tumor. Past hx bladder cancer with resection & past hx cecal cancer with resection in 2009. S/p cystoscopy x 2 in 2020. Denies any dysuria or hematuria. Ongoing smoking habit. Scheduled for OR. Past Medical History:        Diagnosis Date    Allergic rhinitis     Arthritis     Breast cancer ([facility] [address]     RT BREAST--dx 1986--oral chemo    Cecal cancer ([facility] [address]     dx 2002--chemo    Diverticulitis     HTN (hypertension)     meds > 20 yrs    Hypercholesterolemia     past trx with meds -- off meds > 5 yrs    Seasonal allergies     Small bowel obstruction ([facility] [address]      Past Surgical History:    Past Surgical History:   Procedure Laterality Date    APPENDECTOMY      age 31s    BREAST SURGERY Right 1987    mastectomy   [facility] COLONOSCOPY  4/09 & 1/11    COLONOSCOPY  1/9/13,1/10/11          COLONOSCOPY  1/21/15    polypectomy     COLONOSCOPY N/A 5/17/2021    COLORECTAL CANCER SCREENING, HIGH RISK performed by Chalo Owen MD at [facility] 7/8/2020    CYSTOSCOPY TURBT RESECTOSCOPE performed by Blas Bell MD at [place] [address] N/A 9/9/2020    CYSTOSCOPY BLADDER BIOPSY FULGURATION performed by Blas Bell MD at [facility]. [address], COLON, DIAGNOSTIC      EYE SURGERY      Phaco with IOL OU    EYE SURGERY      Lasix OU    HERNIA REPAIR Right 12/11    ING. HERNIA REPAIN W/ MESH    MASTECTOMY Right 1987    MASTECTOMY, MODIFIED RADICAL Right 1987    OTHER SURGICAL HISTORY      EXC.  BACK SKIN CYST    OTHER SURGICAL HISTORY  12/15/2009    PORT REMOVAL    [place] COLONOSCOPY FLX DX W/COLLJ SPEC WHEN PFRMD N/A 3/30/2018    COLONOSCOPY performed by Catherine Welch MD at [address] ESOPHAGOGASTRODUODENOSCOPY TRANSORAL DIAGNOSTIC N/A 3/30/2018    EGD ESOPHAGOGASTRODUODENOSCOPY performed by Catherine Welch MD at [facility]  04/28/09    CECAL CANCER    TUNNELED VENOUS PORT PLACEMENT  06/11/09    REMOVAL 12/09         Medications Prior to Admission:    Current Outpatient Medications   Medication Sig Dispense Refill    Multiple Vitamins-Minerals (PRESERVISION AREDS PO) Take by mouth      polyethylene glycol (GLYCOLAX) 17 GM/SCOOP powder Take 17 g by mouth daily      b complex vitamins capsule Take 1 capsule by mouth daily      Cholecalciferol (VITAMIN D3) 50 MCG (2000 UT) CAPS Take by mouth daily      Omega-3 Fatty Acids (FISH OIL) 1200 MG CAPS Take by mouth daily      traMADol (ULTRAM) 50 MG tablet Take 25 mg by mouth every 8 hours.  Probiotic Product ([place] PO) Take 1 capsule by mouth every evening       Loratadine (CLARITIN PO) Take 1 tablet by mouth daily       triamterene-hydrochlorothiazide (MAXZIDE-25) 37.5-25 MG per tablet Take 1 tablet by mouth daily. No current facility-administered medications for this encounter. Allergies:  Bacitracin, Seasonal, and Statins    Social History:   Social History     Socioeconomic History    Marital status:       Spouse name: Not on file    Number of children: Not on file    Years of education: Not on file    Highest education level: Not on file   Occupational History    Not on file   Tobacco Use    Smoking status: Current Every Day Smoker     Packs/day: 0.25     Years: 50.00     Pack years: 12.50     Types: Cigarettes    Smokeless tobacco: Never Used    Tobacco comment: 1/2-1 pack per week--past 1 ppd   Vaping Use    Vaping Use: Never used   Substance and Sexual Activity    Alcohol use: No    Drug use: Never    Sexual activity: Not on file Other Topics Concern    Not on file   Social History Narrative    Not on file     Social Determinants of Health     Financial Resource Strain:     Difficulty of Paying Living Expenses: Not on file   Food Insecurity:     Worried About Running Out of Food in the Last Year: Not on file    Nadya of Food in the Last Year: Not on file   Transportation Needs:     Lack of Transportation (Medical): Not on file    Lack of Transportation (Non-Medical): Not on file   Physical Activity:     Days of Exercise per Week: Not on file    Minutes of Exercise per Session: Not on file   Stress:     Feeling of Stress : Not on file   Social Connections:     Frequency of Communication with Friends and Family: Not on file    Frequency of Social Gatherings with Friends and Family: Not on file    Attends Hindu Services: Not on file    Active Member of Clubs or Organizations: Not on file    Attends Club or Organization Meetings: Not on file    Marital Status: Not on file   Intimate Partner Violence:     Fear of Current or Ex-Partner: Not on file    Emotionally Abused: Not on file    Physically Abused: Not on file    Sexually Abused: Not on file   Housing Stability:     Unable to Pay for Housing in the Last Year: Not on file    Number of [place] in the Last Year: Not on file    Unstable Housing in the Last Year: Not on file       Family History:       Problem Relation Age of Onset    Heart Disease Mother     Dementia Mother     Colon Polyps Mother     Diabetes Father     Obesity Father     Colon Cancer Neg Hx        Review of Systems   Constitutional: Negative. Negative for chills and fever. HENT: Positive for congestion (sinus) and tinnitus (right ). Negative for sore throat. Eyes: Negative. Negative for visual disturbance. Retinal issues OU. Respiratory: Negative for cough, chest tightness, shortness of breath, wheezing and stridor.     Cardiovascular: Negative for chest pain and Comments: Rounding of thoracic spine. Lymphadenopathy:      Cervical: No cervical adenopathy. Skin:     General: Skin is warm and dry. Findings: No erythema. Neurological:      Mental Status: She is alert and oriented to person, place, and time. Gait: Gait abnormal.      Comments: presents in wheelchair with cane. Psychiatric:         Mood and Affect: Mood normal.         Behavior: Behavior normal.         Thought Content: Thought content normal.         Judgment: Judgment normal.         [unfilled]    Assessment:  Patient Active Problem List   Diagnosis    Cancer ([facility] [address]    Cecal cancer ([facility] [address]    Seasonal allergies    Blood in stool    Gastric ulcer without hemorrhage, perforation, or obstruction    Personal history of colon cancer    Diverticulosis of large intestine without diverticulitis    Bladder tumor    Vitamin D deficiency    Tobacco use disorder    Malignant neoplasm of posterior wall of urinary bladder ([address]    Bladder cancer ([facility] [facility]    History of bladder cancer    SBO (small bowel obstruction) (HCC)    Adenomatous polyp of sigmoid colon    Adenomatous polyp of descending colon         Plan:  Scheduled for cystoscopy, bilateral pyelogram, biopsy of bladder.     SUSY Zavala - CNP  1/4/2022  11:38 AM

## 2022-01-05 LAB
EKG ATRIAL RATE: 69 BPM
EKG P AXIS: 71 DEGREES
EKG P-R INTERVAL: 164 MS
EKG Q-T INTERVAL: 426 MS
EKG QRS DURATION: 76 MS
EKG QTC CALCULATION (BAZETT): 456 MS
EKG R AXIS: 66 DEGREES
EKG T AXIS: 65 DEGREES
EKG VENTRICULAR RATE: 69 BPM

## 2022-01-05 PROCEDURE — 93010 ELECTROCARDIOGRAM REPORT: CPT | Performed by: INTERNAL MEDICINE

## 2022-01-12 ENCOUNTER — ANESTHESIA (OUTPATIENT)
Dept: OPERATING ROOM | Age: 87
End: 2022-01-12
Payer: MEDICARE

## 2022-01-12 ENCOUNTER — HOSPITAL ENCOUNTER (OUTPATIENT)
Dept: GENERAL RADIOLOGY | Age: 87
Setting detail: OUTPATIENT SURGERY
Discharge: HOME | End: 2022-01-14
Attending: UROLOGY
Payer: MEDICARE

## 2022-01-12 ENCOUNTER — HOSPITAL ENCOUNTER (OUTPATIENT)
Age: 87
Setting detail: OUTPATIENT SURGERY
Discharge: HOME | End: 2022-01-12
Attending: UROLOGY | Admitting: UROLOGY
Payer: MEDICARE

## 2022-01-12 ENCOUNTER — ANESTHESIA EVENT (OUTPATIENT)
Dept: OPERATING ROOM | Age: 87
End: 2022-01-12
Payer: MEDICARE

## 2022-01-12 VITALS
WEIGHT: 150.4 LBS | DIASTOLIC BLOOD PRESSURE: 69 MMHG | BODY MASS INDEX: 28.4 KG/M2 | HEART RATE: 72 BPM | RESPIRATION RATE: 16 BRPM | HEIGHT: 61 IN | SYSTOLIC BLOOD PRESSURE: 160 MMHG | TEMPERATURE: 97.1 F | OXYGEN SATURATION: 96 %

## 2022-01-12 VITALS — DIASTOLIC BLOOD PRESSURE: 50 MMHG | SYSTOLIC BLOOD PRESSURE: 90 MMHG | OXYGEN SATURATION: 99 %

## 2022-01-12 DIAGNOSIS — R52 PAIN: ICD-10-CM

## 2022-01-12 LAB — SARS-COV-2, NAAT: NOT DETECTED

## 2022-01-12 PROCEDURE — 2709999900 HC NON-CHARGEABLE SUPPLY: Performed by: UROLOGY

## 2022-01-12 PROCEDURE — 6370000000 HC RX 637 (ALT 250 FOR IP): Performed by: UROLOGY

## 2022-01-12 PROCEDURE — 6360000002 HC RX W HCPCS: Performed by: ANESTHESIOLOGY

## 2022-01-12 PROCEDURE — 3600000014 HC SURGERY LEVEL 4 ADDTL 15MIN: Performed by: UROLOGY

## 2022-01-12 PROCEDURE — 7100000011 HC PHASE II RECOVERY - ADDTL 15 MIN: Performed by: UROLOGY

## 2022-01-12 PROCEDURE — 7100000001 HC PACU RECOVERY - ADDTL 15 MIN: Performed by: UROLOGY

## 2022-01-12 PROCEDURE — 3700000000 HC ANESTHESIA ATTENDED CARE: Performed by: UROLOGY

## 2022-01-12 PROCEDURE — 2580000003 HC RX 258: Performed by: UROLOGY

## 2022-01-12 PROCEDURE — 52234 CYSTOSCOPY AND TREATMENT: CPT | Performed by: UROLOGY

## 2022-01-12 PROCEDURE — 88307 TISSUE EXAM BY PATHOLOGIST: CPT

## 2022-01-12 PROCEDURE — 7100000010 HC PHASE II RECOVERY - FIRST 15 MIN: Performed by: UROLOGY

## 2022-01-12 PROCEDURE — 88305 TISSUE EXAM BY PATHOLOGIST: CPT

## 2022-01-12 PROCEDURE — 3600000004 HC SURGERY LEVEL 4 BASE: Performed by: UROLOGY

## 2022-01-12 PROCEDURE — 2500000003 HC RX 250 WO HCPCS: Performed by: REGISTERED NURSE

## 2022-01-12 PROCEDURE — 74420 UROGRAPHY RTRGR +-KUB: CPT | Performed by: UROLOGY

## 2022-01-12 PROCEDURE — 3700000001 HC ADD 15 MINUTES (ANESTHESIA): Performed by: UROLOGY

## 2022-01-12 PROCEDURE — 7100000000 HC PACU RECOVERY - FIRST 15 MIN: Performed by: UROLOGY

## 2022-01-12 PROCEDURE — 2580000003 HC RX 258: Performed by: REGISTERED NURSE

## 2022-01-12 PROCEDURE — 6360000002 HC RX W HCPCS: Performed by: NURSE PRACTITIONER

## 2022-01-12 PROCEDURE — C1758 CATHETER, URETERAL: HCPCS | Performed by: UROLOGY

## 2022-01-12 PROCEDURE — 2580000003 HC RX 258: Performed by: NURSE PRACTITIONER

## 2022-01-12 PROCEDURE — 6360000002 HC RX W HCPCS: Performed by: REGISTERED NURSE

## 2022-01-12 PROCEDURE — 76000 FLUOROSCOPY <1 HR PHYS/QHP: CPT

## 2022-01-12 PROCEDURE — 6360000004 HC RX CONTRAST MEDICATION: Performed by: UROLOGY

## 2022-01-12 PROCEDURE — 87635 SARS-COV-2 COVID-19 AMP PRB: CPT

## 2022-01-12 PROCEDURE — C1769 GUIDE WIRE: HCPCS | Performed by: UROLOGY

## 2022-01-12 RX ORDER — PROPOFOL 10 MG/ML
INJECTION, EMULSION INTRAVENOUS PRN
Status: DISCONTINUED | OUTPATIENT
Start: 2022-01-12 | End: 2022-01-12 | Stop reason: SDUPTHER

## 2022-01-12 RX ORDER — ONDANSETRON 2 MG/ML
INJECTION INTRAMUSCULAR; INTRAVENOUS PRN
Status: DISCONTINUED | OUTPATIENT
Start: 2022-01-12 | End: 2022-01-12 | Stop reason: SDUPTHER

## 2022-01-12 RX ORDER — ROCURONIUM BROMIDE 10 MG/ML
INJECTION, SOLUTION INTRAVENOUS PRN
Status: DISCONTINUED | OUTPATIENT
Start: 2022-01-12 | End: 2022-01-12 | Stop reason: SDUPTHER

## 2022-01-12 RX ORDER — 0.9 % SODIUM CHLORIDE 0.9 %
500 INTRAVENOUS SOLUTION INTRAVENOUS
Status: DISCONTINUED | OUTPATIENT
Start: 2022-01-12 | End: 2022-01-12 | Stop reason: HOSPADM

## 2022-01-12 RX ORDER — PROCHLORPERAZINE EDISYLATE 5 MG/ML
5 INJECTION INTRAMUSCULAR; INTRAVENOUS
Status: DISCONTINUED | OUTPATIENT
Start: 2022-01-12 | End: 2022-01-12 | Stop reason: HOSPADM

## 2022-01-12 RX ORDER — MAGNESIUM HYDROXIDE 1200 MG/15ML
LIQUID ORAL PRN
Status: DISCONTINUED | OUTPATIENT
Start: 2022-01-12 | End: 2022-01-12 | Stop reason: ALTCHOICE

## 2022-01-12 RX ORDER — ONDANSETRON 2 MG/ML
4 INJECTION INTRAMUSCULAR; INTRAVENOUS
Status: DISCONTINUED | OUTPATIENT
Start: 2022-01-12 | End: 2022-01-12 | Stop reason: HOSPADM

## 2022-01-12 RX ORDER — GENTAMICIN SULFATE 80 MG/100ML
80 INJECTION, SOLUTION INTRAVENOUS ONCE
Status: COMPLETED | OUTPATIENT
Start: 2022-01-12 | End: 2022-01-12

## 2022-01-12 RX ORDER — HYDROCODONE BITARTRATE AND ACETAMINOPHEN 5; 325 MG/1; MG/1
1 TABLET ORAL PRN
Status: DISCONTINUED | OUTPATIENT
Start: 2022-01-12 | End: 2022-01-12 | Stop reason: HOSPADM

## 2022-01-12 RX ORDER — LIDOCAINE HYDROCHLORIDE 10 MG/ML
INJECTION, SOLUTION EPIDURAL; INFILTRATION; INTRACAUDAL; PERINEURAL PRN
Status: DISCONTINUED | OUTPATIENT
Start: 2022-01-12 | End: 2022-01-12 | Stop reason: SDUPTHER

## 2022-01-12 RX ORDER — FENTANYL CITRATE 50 UG/ML
25 INJECTION, SOLUTION INTRAMUSCULAR; INTRAVENOUS EVERY 5 MIN PRN
Status: DISCONTINUED | OUTPATIENT
Start: 2022-01-12 | End: 2022-01-12 | Stop reason: HOSPADM

## 2022-01-12 RX ORDER — LIDOCAINE HYDROCHLORIDE 20 MG/ML
JELLY TOPICAL PRN
Status: DISCONTINUED | OUTPATIENT
Start: 2022-01-12 | End: 2022-01-12 | Stop reason: ALTCHOICE

## 2022-01-12 RX ORDER — SODIUM CHLORIDE, SODIUM LACTATE, POTASSIUM CHLORIDE, CALCIUM CHLORIDE 600; 310; 30; 20 MG/100ML; MG/100ML; MG/100ML; MG/100ML
INJECTION, SOLUTION INTRAVENOUS CONTINUOUS
Status: DISCONTINUED | OUTPATIENT
Start: 2022-01-12 | End: 2022-01-12 | Stop reason: HOSPADM

## 2022-01-12 RX ORDER — LABETALOL HYDROCHLORIDE 5 MG/ML
5 INJECTION, SOLUTION INTRAVENOUS EVERY 10 MIN PRN
Status: DISCONTINUED | OUTPATIENT
Start: 2022-01-12 | End: 2022-01-12 | Stop reason: HOSPADM

## 2022-01-12 RX ORDER — GLYCOPYRROLATE 1 MG/5 ML
SYRINGE (ML) INTRAVENOUS PRN
Status: DISCONTINUED | OUTPATIENT
Start: 2022-01-12 | End: 2022-01-12 | Stop reason: SDUPTHER

## 2022-01-12 RX ORDER — SODIUM CHLORIDE 0.9 % (FLUSH) 0.9 %
10 SYRINGE (ML) INJECTION EVERY 12 HOURS SCHEDULED
Status: DISCONTINUED | OUTPATIENT
Start: 2022-01-12 | End: 2022-01-12 | Stop reason: HOSPADM

## 2022-01-12 RX ORDER — DIPHENHYDRAMINE HYDROCHLORIDE 50 MG/ML
12.5 INJECTION INTRAMUSCULAR; INTRAVENOUS
Status: DISCONTINUED | OUTPATIENT
Start: 2022-01-12 | End: 2022-01-12 | Stop reason: HOSPADM

## 2022-01-12 RX ORDER — HYDROCODONE BITARTRATE AND ACETAMINOPHEN 5; 325 MG/1; MG/1
2 TABLET ORAL PRN
Status: DISCONTINUED | OUTPATIENT
Start: 2022-01-12 | End: 2022-01-12 | Stop reason: HOSPADM

## 2022-01-12 RX ORDER — SODIUM CHLORIDE, SODIUM LACTATE, POTASSIUM CHLORIDE, CALCIUM CHLORIDE 600; 310; 30; 20 MG/100ML; MG/100ML; MG/100ML; MG/100ML
INJECTION, SOLUTION INTRAVENOUS CONTINUOUS PRN
Status: DISCONTINUED | OUTPATIENT
Start: 2022-01-12 | End: 2022-01-12 | Stop reason: SDUPTHER

## 2022-01-12 RX ORDER — SODIUM CHLORIDE 0.9 % (FLUSH) 0.9 %
10 SYRINGE (ML) INJECTION PRN
Status: DISCONTINUED | OUTPATIENT
Start: 2022-01-12 | End: 2022-01-12 | Stop reason: HOSPADM

## 2022-01-12 RX ORDER — SODIUM CHLORIDE 9 MG/ML
25 INJECTION, SOLUTION INTRAVENOUS PRN
Status: DISCONTINUED | OUTPATIENT
Start: 2022-01-12 | End: 2022-01-12 | Stop reason: HOSPADM

## 2022-01-12 RX ORDER — LIDOCAINE HYDROCHLORIDE 10 MG/ML
1 INJECTION, SOLUTION EPIDURAL; INFILTRATION; INTRACAUDAL; PERINEURAL
Status: DISCONTINUED | OUTPATIENT
Start: 2022-01-12 | End: 2022-01-12 | Stop reason: HOSPADM

## 2022-01-12 RX ADMIN — SODIUM CHLORIDE, POTASSIUM CHLORIDE, SODIUM LACTATE AND CALCIUM CHLORIDE: 600; 310; 30; 20 INJECTION, SOLUTION INTRAVENOUS at 10:12

## 2022-01-12 RX ADMIN — SUGAMMADEX 100 MG: 100 INJECTION, SOLUTION INTRAVENOUS at 11:03

## 2022-01-12 RX ADMIN — FENTANYL CITRATE 25 MCG: 50 INJECTION INTRAMUSCULAR; INTRAVENOUS at 11:29

## 2022-01-12 RX ADMIN — SODIUM CHLORIDE, POTASSIUM CHLORIDE, SODIUM LACTATE AND CALCIUM CHLORIDE: 600; 310; 30; 20 INJECTION, SOLUTION INTRAVENOUS at 08:40

## 2022-01-12 RX ADMIN — ROCURONIUM BROMIDE 10 MG: 10 INJECTION INTRAVENOUS at 10:53

## 2022-01-12 RX ADMIN — LIDOCAINE HYDROCHLORIDE 60 MG: 10 INJECTION, SOLUTION EPIDURAL; INFILTRATION; INTRACAUDAL; PERINEURAL at 10:13

## 2022-01-12 RX ADMIN — Medication 0.4 MG: at 10:42

## 2022-01-12 RX ADMIN — GENTAMICIN SULFATE 80 MG: 80 INJECTION, SOLUTION INTRAVENOUS at 10:17

## 2022-01-12 RX ADMIN — ONDANSETRON 4 MG: 2 INJECTION INTRAMUSCULAR; INTRAVENOUS at 10:37

## 2022-01-12 RX ADMIN — PROPOFOL 150 MG: 10 INJECTION, EMULSION INTRAVENOUS at 10:12

## 2022-01-12 ASSESSMENT — PULMONARY FUNCTION TESTS
PIF_VALUE: 15
PIF_VALUE: 17
PIF_VALUE: 0
PIF_VALUE: 19
PIF_VALUE: 16
PIF_VALUE: 27
PIF_VALUE: 15
PIF_VALUE: 17
PIF_VALUE: 12
PIF_VALUE: 16
PIF_VALUE: 15
PIF_VALUE: 15
PIF_VALUE: 16
PIF_VALUE: 15
PIF_VALUE: 15
PIF_VALUE: 0
PIF_VALUE: 14
PIF_VALUE: 19
PIF_VALUE: 0
PIF_VALUE: 16
PIF_VALUE: 15
PIF_VALUE: 15
PIF_VALUE: 16
PIF_VALUE: 16
PIF_VALUE: 6
PIF_VALUE: 9
PIF_VALUE: 17
PIF_VALUE: 15
PIF_VALUE: 15
PIF_VALUE: 1
PIF_VALUE: 0
PIF_VALUE: 15
PIF_VALUE: 16
PIF_VALUE: 2
PIF_VALUE: 17
PIF_VALUE: 13
PIF_VALUE: 17
PIF_VALUE: 15
PIF_VALUE: 7
PIF_VALUE: 15
PIF_VALUE: 15
PIF_VALUE: 16
PIF_VALUE: 4
PIF_VALUE: 2
PIF_VALUE: 2
PIF_VALUE: 16
PIF_VALUE: 15
PIF_VALUE: 0
PIF_VALUE: 15
PIF_VALUE: 7
PIF_VALUE: 15
PIF_VALUE: 16
PIF_VALUE: 16
PIF_VALUE: 3
PIF_VALUE: 7
PIF_VALUE: 15
PIF_VALUE: 16
PIF_VALUE: 17
PIF_VALUE: 15
PIF_VALUE: 15
PIF_VALUE: 3
PIF_VALUE: 17

## 2022-01-12 ASSESSMENT — PAIN SCALES - GENERAL: PAINLEVEL_OUTOF10: 8

## 2022-01-12 NOTE — ANESTHESIA PRE PROCEDURE
Department of Anesthesiology  Preprocedure Note       Name:  Brenna Singh   Age:  82 y.o.  :  1935                                          MRN:  [id number]         Date:  2022      Surgeon: Aly Khan):  Vania Anderson MD    Procedure: Procedure(s):  CYSTOSCOPY BILATERAL PYELOGRAM  BIOPSY OF BLADDER    Medications prior to admission:   Prior to Admission medications    Medication Sig Start Date End Date Taking? Authorizing Provider   traMADol (ULTRAM) 50 MG tablet Take 25 mg by mouth every 8 hours. 6/10/20  Yes Historical Provider, MD   Probiotic Product ([address]) Take 1 capsule by mouth every evening    Yes Historical Provider, MD   Loratadine (CLARITIN PO) Take 1 tablet by mouth daily    Yes Historical Provider, MD   triamterene-hydrochlorothiazide (MAXZIDE-25) 37.5-25 MG per tablet Take 1 tablet by mouth daily.      Yes Historical Provider, MD   Multiple Vitamins-Minerals (PRESERVISION AREDS PO) Take by mouth    Historical Provider, MD   polyethylene glycol (GLYCOLAX) 17 GM/SCOOP powder Take 17 g by mouth daily    Historical Provider, MD   b complex vitamins capsule Take 1 capsule by mouth daily    Historical Provider, MD   Cholecalciferol (VITAMIN D3) 50 MCG (2000 UT) CAPS Take by mouth daily    Historical Provider, MD   Omega-3 Fatty Acids (FISH OIL) 1200 MG CAPS Take by mouth daily    Historical Provider, MD       Current medications:    Current Facility-Administered Medications   Medication Dose Route Frequency Provider Last Rate Last Admin    0.9 % sodium chloride infusion  25 mL IntraVENous PRN SUSY Henderson CNP        lactated ringers infusion   IntraVENous Continuous SUSY Henderson  mL/hr at 22 0840 New Bag at 22 0840    lidocaine PF 1 % injection 1 mL  1 mL IntraDERmal Once PRN SUSY Henderson CNP        sodium chloride flush 0.9 % injection 10 mL  10 mL IntraVENous 2 times per day SUSY Henderson last solid food consumption: 01/12/22    BMI:   Wt Readings from Last 3 Encounters:   01/12/22 150 lb 6.4 oz (68.2 kg)   01/04/22 150 lb 6.4 oz (68.2 kg)   12/27/21 152 lb (68.9 kg)     Body mass index is 28.42 kg/m². CBC:   Lab Results   Component Value Date    WBC 4.1 01/04/2022    RBC 4.65 01/04/2022    RBC 3.73 01/01/2012    HGB 14.8 01/04/2022    HCT 44.3 01/04/2022    MCV 95.2 01/04/2022    RDW 13.5 01/04/2022     01/04/2022       CMP:   Lab Results   Component Value Date     01/04/2022    K 4.0 01/04/2022    CL 99 01/04/2022    CO2 32 01/04/2022    BUN 17 01/04/2022    CREATININE 0.88 01/04/2022    GFRAA >60.0 01/04/2022    LABGLOM >60.0 01/04/2022    GLUCOSE 111 01/04/2022    GLUCOSE 138 01/01/2012    PROT 7.9 11/15/2021    CALCIUM 9.6 01/04/2022    BILITOT 0.6 11/15/2021    ALKPHOS 110 11/15/2021    AST 22 11/15/2021    ALT 15 11/15/2021       POC Tests: No results for input(s): POCGLU, POCNA, POCK, POCCL, POCBUN, POCHEMO, POCHCT in the last 72 hours.     Coags:   Lab Results   Component Value Date    PROTIME 10.1 11/10/2013    INR 1.0 11/10/2013    APTT 32.8 11/10/2013       HCG (If Applicable): No results found for: PREGTESTUR, PREGSERUM, HCG, HCGQUANT     ABGs: No results found for: PHART, PO2ART, TUL4VCF, KMF8OLP, BEART, U0DOENNX     Type & Screen (If Applicable):  No results found for: LABABO, LABRH    Drug/Infectious Status (If Applicable):  No results found for: HIV, HEPCAB    COVID-19 Screening (If Applicable):   Lab Results   Component Value Date    COVID19 Not Detected 01/12/2022    COVID19 Not Detected 05/10/2021           Anesthesia Evaluation  Patient summary reviewed and Nursing notes reviewed no history of anesthetic complications:   Airway: Mallampati: II  TM distance: >3 FB   Neck ROM: full  Mouth opening: > = 3 FB Dental: normal exam         Pulmonary:Negative Pulmonary ROS and normal exam                               Cardiovascular:Negative CV ROS  Exercise tolerance: good (>4 METS),   (+) hypertension:,       ECG reviewed               Beta Blocker:  Not on Beta Blocker         Neuro/Psych:   Negative Neuro/Psych ROS              GI/Hepatic/Renal: Neg GI/Hepatic/Renal ROS  (+) PUD,           Endo/Other: Negative Endo/Other ROS             Pt had PAT visit. Abdominal:             Vascular: negative vascular ROS. Other Findings:             Anesthesia Plan      general     ASA 2       Induction: intravenous. MIPS: Postoperative opioids intended and Prophylactic antiemetics administered. Anesthetic plan and risks discussed with patient. Plan discussed with CRNA.     Attending anesthesiologist reviewed and agrees with Pre Eval content              Ashley Sheppard MD   1/12/2022

## 2022-01-12 NOTE — OP NOTE
[facility] [address] [place] [address], [address] [facility]                                OPERATIVE REPORT    PATIENT NAME: Ella Easton                   :        1935  MED REC NO:   [id number]                            ROOM:  ACCOUNT NO:   [de-identified]                           ADMIT DATE: 2022  PROVIDER:     Eb Liriano MD    DATE OF PROCEDURE:  2022    PREOPERATIVE DIAGNOSIS:  Recurrent polypoid lesion of the bladder. POSTOPERATIVE DIAGNOSIS:  Recurrent polypoid lesion of the bladder. OPERATION:  Transurethral resection of bladder tumor, 0.5 mm to 2 cm in  size, bilateral retrograde pyelograms. SURGEON:  Cristóbal Wang. Ailyn Licea MD    ANESTHESIA:  General.    ESTIMATED BLOOD LOSS:  Not applicable. INDICATIONS:  The patient is an 19-year-old female with history of  smoking and history of superficial bladder cancer, who had initial  resection of a bladder tumor near the right ureteral orifice. Followup cystoscopy recently showed a polypoid lesion just lateral to  the right ureteral orifice consistent with neoplasm. The patient will  undergo resection of this lesion and bilateral retrograde pyelograms to  rule out any type of upper tract lesions. OPERATIVE NOTE:  The patient received preoperative IV antibiotics. She  was taken to operating room, placed on the operative table in dorsal  lithotomy position. LMA anesthesia was induced. A 21-Luxembourgish cystoscope  was used to examine the bladder. The only lesion that was noted was the  one that had been noted in the office, which was a solitary polypoid  lesion lateral to the right ureteral orifice. Then, a 4-Luxembourgish end-hole  catheter was used to perform bilateral retrograde pyelograms. The right  retrograde pyelogram showed a normal anatomy with no evidence of  hydronephrosis and no evidence of filling defects.   Left retrograde  pyelogram showed no evidence of hydronephrosis and no evidence of  abnormalities with anatomy and there were no filling defects. Both  retrograde pyelograms were completely within normal limits. Then a  resectoscope was placed into the bladder and the tumor lateral to the  right ureteral orifice was resected while the right ureteral catheter  was in place protecting the right ureteral orifice. This tumor was sampled with  superficial and deep biopsies. Area was then fulgurated and the right  ureteral catheter was then removed. The patient was discharged to  recovery room in stable condition. The patient tolerated the procedure  well. There were no complications.         Marcos Houston MD    D: 01/12/2022 13:22:40       T: 01/12/2022 13:25:50     [email or web address]  Job#: [id number]     Doc#: [id number]    CC:

## 2022-01-12 NOTE — BRIEF OP NOTE
Brief Postoperative Note      Patient: Betsy Rene  YOB: 1935  MRN: [id number]    Date of Procedure: 1/12/2022    Pre-Op Diagnosis: BLADDER TUMOR    Post-Op Diagnosis: Same       Procedure(s):  CYSTOSCOPY BILATERAL PYELOGRAM  BIOPSY OF BLADDER  TURBT 0.5 to 2.0 cm  Surgeon(s):  Madelyn Kerr MD    Assistant:  * No surgical staff found *    Anesthesia: General    Estimated Blood Loss (mL): Minimal    Complications: None    Specimens:   ID Type Source Tests Collected by Time Destination   A : BLADDER TUMOR  Tissue Bladder SURGICAL PATHOLOGY Madelyn Kerr MD 1/12/2022 1024    B : DEEP BLADDER WALL  Tissue Bladder SURGICAL PATHOLOGY Madelyn Kerr MD 1/12/2022 1055        Implants:  * No implants in log *      Drains: * No LDAs found *    Findings: normal reterograde pyelogram bilaterally    Electronically signed by Madelyn Kerr MD on 1/12/2022 at 11:02 AM

## 2022-01-12 NOTE — ANESTHESIA POSTPROCEDURE EVALUATION
Department of Anesthesiology  Postprocedure Note    Patient: Jasiel Hughes  MRN: [id number]  YOB: 1935  Date of evaluation: 1/12/2022  Time:  11:18 AM     Procedure Summary     Date: 01/12/22 Room / Location: [facility]    Anesthesia Start: 1005 Anesthesia Stop: 1109    Procedures:       [address] (N/A )      BIOPSY OF BLADDER (N/A ) Diagnosis: (BLADDER TUMOR)    Surgeons: Yajaira Durant MD Responsible Provider: Bunny Lane MD    Anesthesia Type: general ASA Status: 2          Anesthesia Type: general    Dorian Phase I: Dorian Score: 4    Dorian Phase II:      Last vitals: Reviewed and per EMR flowsheets.        Anesthesia Post Evaluation    Patient location during evaluation: bedside  Patient participation: complete - patient participated  Level of consciousness: awake and awake and alert  Pain score: 0  Airway patency: patent  Nausea & Vomiting: no nausea and no vomiting  Complications: no  Cardiovascular status: blood pressure returned to baseline and hemodynamically stable  Respiratory status: acceptable  Hydration status: euvolemic

## 2022-01-24 ENCOUNTER — VIRTUAL VISIT (OUTPATIENT)
Dept: UROLOGY | Age: 87
End: 2022-01-24
Payer: MEDICARE

## 2022-01-24 DIAGNOSIS — C67.0 MALIGNANT NEOPLASM OF TRIGONE OF URINARY BLADDER (HCC): Primary | ICD-10-CM

## 2022-01-24 PROCEDURE — 99441 PR PHYS/QHP TELEPHONE EVALUATION 5-10 MIN: CPT | Performed by: UROLOGY

## 2022-01-24 NOTE — PROGRESS NOTES
Urology  Postop virtual visit  To review pathology from TURBT      FINAL DIAGNOSIS:   A.  TUR BLADDER TUMOR:   INVASIVE UROTHELIAL CARCINOMA     B.  DEEP BLADDER WALL:   UROTHELIAL CARCINOMA WITH CAUTERY EFFECT       PROCEDURE:  TURBT   TUMOR SITE: NOT SPECIFIED   HISTOLOGIC TYPE: INVASIVE PAPILLARY UROTHELIAL CARCINOMA   HISTOLOGIC GRADE: HIGH-GRADE   TUMOR EXTENT: INVADES LAMINA PROPRIA   LYMPHOVASCULAR INVASION: NOT IDENTIFIED   MUSCULARIS PROPRIA: NOT IDENTIFIED       COMMENT: DR. RIOS NOTIFIED VIA [facility] 1/14/2022    Patient has no complaints  She will be scheduled for biopsy of the bladder on 3/2/2022  Preoperative instructions given  Greater than 5 less than 10-minute virtual visit over telephone  Hilda Vela MD

## 2022-01-25 ENCOUNTER — TELEPHONE (OUTPATIENT)
Dept: UROLOGY | Age: 87
End: 2022-01-25

## 2022-01-25 NOTE — TELEPHONE ENCOUNTER
----- Message from [place], [address] [place] sent at 1/25/2022  2:21 PM EST -----  Regarding: advice  Contact: [phone]  Patient states that her toilet tissue has blood on it from her urinating, denies pain.  Patient is a bit up upset and was wondering if it was normal. Please advise

## 2022-02-24 ENCOUNTER — HOSPITAL ENCOUNTER (OUTPATIENT)
Dept: PREADMISSION TESTING | Age: 87
Discharge: HOME | End: 2022-02-28
Payer: MEDICARE

## 2022-02-24 VITALS
TEMPERATURE: 98 F | WEIGHT: 153 LBS | DIASTOLIC BLOOD PRESSURE: 71 MMHG | HEIGHT: 61 IN | BODY MASS INDEX: 28.89 KG/M2 | HEART RATE: 71 BPM | SYSTOLIC BLOOD PRESSURE: 166 MMHG | OXYGEN SATURATION: 98 % | RESPIRATION RATE: 16 BRPM

## 2022-02-24 LAB
ABO/RH: NORMAL
ANION GAP SERPL CALCULATED.3IONS-SCNC: 13 MEQ/L (ref 9–15)
ANTIBODY SCREEN: NORMAL
BUN BLDV-MCNC: 19 MG/DL (ref 8–23)
CALCIUM SERPL-MCNC: 9.9 MG/DL (ref 8.5–9.9)
CHLORIDE BLD-SCNC: 101 MEQ/L (ref 95–107)
CO2: 31 MEQ/L (ref 20–31)
CREAT SERPL-MCNC: 0.94 MG/DL (ref 0.5–0.9)
GFR AFRICAN AMERICAN: >60
GFR NON-AFRICAN AMERICAN: 56.6
GLUCOSE BLD-MCNC: 111 MG/DL (ref 70–99)
HCT VFR BLD CALC: 42.1 % (ref 37–47)
HEMOGLOBIN: 14.3 G/DL (ref 12–16)
MCH RBC QN AUTO: 31.8 PG (ref 27–31.3)
MCHC RBC AUTO-ENTMCNC: 33.9 % (ref 33–37)
MCV RBC AUTO: 93.9 FL (ref 82–100)
PDW BLD-RTO: 13.5 % (ref 11.5–14.5)
PLATELET # BLD: 228 K/UL (ref 130–400)
POTASSIUM SERPL-SCNC: 4.5 MEQ/L (ref 3.4–4.9)
RBC # BLD: 4.49 M/UL (ref 4.2–5.4)
SARS-COV-2, PCR: NOT DETECTED
SODIUM BLD-SCNC: 145 MEQ/L (ref 135–144)
WBC # BLD: 4.6 K/UL (ref 4.8–10.8)

## 2022-02-24 PROCEDURE — 86901 BLOOD TYPING SEROLOGIC RH(D): CPT

## 2022-02-24 PROCEDURE — 85027 COMPLETE CBC AUTOMATED: CPT

## 2022-02-24 PROCEDURE — 87635 SARS-COV-2 COVID-19 AMP PRB: CPT

## 2022-02-24 PROCEDURE — 86900 BLOOD TYPING SEROLOGIC ABO: CPT

## 2022-02-24 PROCEDURE — 86850 RBC ANTIBODY SCREEN: CPT

## 2022-02-24 PROCEDURE — 80048 BASIC METABOLIC PNL TOTAL CA: CPT

## 2022-02-24 RX ORDER — SODIUM CHLORIDE 9 MG/ML
25 INJECTION, SOLUTION INTRAVENOUS PRN
Status: CANCELLED | OUTPATIENT
Start: 2022-03-02

## 2022-02-24 RX ORDER — SODIUM CHLORIDE 0.9 % (FLUSH) 0.9 %
10 SYRINGE (ML) INJECTION EVERY 12 HOURS SCHEDULED
Status: CANCELLED | OUTPATIENT
Start: 2022-03-02

## 2022-02-24 RX ORDER — SODIUM CHLORIDE, SODIUM LACTATE, POTASSIUM CHLORIDE, CALCIUM CHLORIDE 600; 310; 30; 20 MG/100ML; MG/100ML; MG/100ML; MG/100ML
INJECTION, SOLUTION INTRAVENOUS CONTINUOUS
Status: CANCELLED | OUTPATIENT
Start: 2022-03-02

## 2022-02-24 RX ORDER — SODIUM CHLORIDE 0.9 % (FLUSH) 0.9 %
10 SYRINGE (ML) INJECTION PRN
Status: CANCELLED | OUTPATIENT
Start: 2022-03-02

## 2022-02-24 RX ORDER — LIDOCAINE HYDROCHLORIDE 10 MG/ML
1 INJECTION, SOLUTION EPIDURAL; INFILTRATION; INTRACAUDAL; PERINEURAL
Status: CANCELLED | OUTPATIENT
Start: 2022-03-02 | End: 2022-03-02

## 2022-02-24 ASSESSMENT — ENCOUNTER SYMPTOMS
NAUSEA: 0
SORE THROAT: 0
SHORTNESS OF BREATH: 0
STRIDOR: 0
EYES NEGATIVE: 1
CONSTIPATION: 1
BACK PAIN: 1
WHEEZING: 0
ABDOMINAL PAIN: 0
COUGH: 0
VOMITING: 0
ALLERGIC/IMMUNOLOGIC NEGATIVE: 1
CHEST TIGHTNESS: 0
DIARRHEA: 0

## 2022-02-24 NOTE — H&P
Nurse Practitioner History and Physical      CHIEF COMPLAINT:  Bladder cancer    HISTORY OF PRESENT ILLNESS:      The patient is a 82 y.o. female with significant past medical history of bladder cancer who presents for cystoscopy, biopsy, fulguration possible right retrograde pyelogram.  Dx bladder cancer due to sx hematuria in 2020. S/p TURBT. C/o some chronic urgency. Denies any other  sx. Surveillance OR. Scheduled for OR. Past Medical History:        Diagnosis Date    Allergic rhinitis     Arthritis     Breast cancer ([facility] [address]     RT BREAST--dx 1986--oral chemo -- OR    Cecal cancer ([address]     dx 2002--chemo    Diverticulitis     HTN (hypertension)     meds > 20 yrs    Hypercholesterolemia     past trx with meds -- off meds > 5 yrs    Seasonal allergies     Small bowel obstruction ([facility] [address]      Past Surgical History:    Past Surgical History:   Procedure Laterality Date    APPENDECTOMY      age 31s    BREAST SURGERY Right 1987    mastectomy   [facility] COLONOSCOPY  4/09 & 1/11    COLONOSCOPY  1/9/13,1/10/11          COLONOSCOPY  1/21/15    polypectomy     COLONOSCOPY N/A 5/17/2021    COLORECTAL CANCER SCREENING, HIGH RISK performed by Js Briseno MD at [facility] 7/8/2020    CYSTOSCOPY TURBT RESECTOSCOPE performed by Neeru Alfaro MD at [facility], 1/12/2022    CYSTOSCOPY BILATERAL PYELOGRAM performed by Neeru Alfaro MD at [facility] N/A 1/12/2022    BIOPSY OF BLADDER performed by Neeru Alfaro MD at [place] [address] N/A 9/9/2020    CYSTOSCOPY BLADDER BIOPSY FULGURATION performed by Neeru Alfaro MD at [facility]. [address], COLON, DIAGNOSTIC      EYE SURGERY      [place] with IOL OU    EYE SURGERY      Lasik OU    HERNIA REPAIR Right 12/11    ING.  HERNIA REPAIN W/ MESH    MASTECTOMY Right 1987    MASTECTOMY, MODIFIED RADICAL Right 1987    OTHER SURGICAL HISTORY      EXC. BACK SKIN CYST    OTHER SURGICAL HISTORY  12/15/2009    PORT REMOVAL    AR COLONOSCOPY FLX DX W/COLLJ SPEC WHEN PFRMD N/A 3/30/2018    COLONOSCOPY performed by Luis Adams MD at [address] ESOPHAGOGASTRODUODENOSCOPY TRANSORAL DIAGNOSTIC N/A 3/30/2018    EGD ESOPHAGOGASTRODUODENOSCOPY performed by Luis Adams MD at [facility]  04/28/09    CECAL CANCER    TUNNELED VENOUS PORT PLACEMENT  06/11/09    REMOVAL 12/09         Medications Prior to Admission:    Current Outpatient Medications   Medication Sig Dispense Refill    Multiple Vitamins-Minerals (PRESERVISION AREDS PO) Take by mouth      polyethylene glycol (GLYCOLAX) 17 GM/SCOOP powder Take 17 g by mouth daily      b complex vitamins capsule Take 1 capsule by mouth daily      Cholecalciferol (VITAMIN D3) 50 MCG (2000 UT) CAPS Take by mouth daily      Omega-3 Fatty Acids (FISH OIL) 1200 MG CAPS Take by mouth daily      traMADol (ULTRAM) 50 MG tablet Take 25 mg by mouth every 8 hours.  Probiotic Product ([facility] PO) Take 1 capsule by mouth every evening       Loratadine (CLARITIN PO) Take 1 tablet by mouth daily       triamterene-hydrochlorothiazide (MAXZIDE-25) 37.5-25 MG per tablet Take 1 tablet by mouth daily. No current facility-administered medications for this encounter. Allergies:  Bacitracin, Seasonal, and Statins    Social History:   Social History     Socioeconomic History    Marital status:       Spouse name: Not on file    Number of children: Not on file    Years of education: Not on file    Highest education level: Not on file   Occupational History    Not on file   Tobacco Use    Smoking status: Current Every Day Smoker     Packs/day: 0.25     Years: 50.00     Pack years: 12.50     Types: Cigarettes     Start date: 2/24/1958    Smokeless tobacco: Never Used    Tobacco comment: 1/2-1 pack per week--past 1 ppd   Vaping Use    Vaping Use: Never used   Substance and Sexual Activity    Alcohol use: No    Drug use: Never    Sexual activity: Not on file   Other Topics Concern    Not on file   Social History Narrative    Not on file     Social Determinants of Health     Financial Resource Strain:     Difficulty of Paying Living Expenses: Not on file   Food Insecurity:     Worried About Running Out of Food in the Last Year: Not on file    Nadya of Food in the Last Year: Not on file   Transportation Needs:     Lack of Transportation (Medical): Not on file    Lack of Transportation (Non-Medical): Not on file   Physical Activity:     Days of Exercise per Week: Not on file    Minutes of Exercise per Session: Not on file   Stress:     Feeling of Stress : Not on file   Social Connections:     Frequency of Communication with Friends and Family: Not on file    Frequency of Social Gatherings with Friends and Family: Not on file    Attends Protestant Services: Not on file    Active Member of Clubs or Organizations: Not on file    Attends Club or Organization Meetings: Not on file    Marital Status: Not on file   Intimate Partner Violence:     Fear of Current or Ex-Partner: Not on file    Emotionally Abused: Not on file    Physically Abused: Not on file    Sexually Abused: Not on file   Housing Stability:     Unable to Pay for Housing in the Last Year: Not on file    Number of [place] in the Last Year: Not on file    Unstable Housing in the Last Year: Not on file       Family History:       Problem Relation Age of Onset    Heart Disease Mother     Dementia Mother     Colon Polyps Mother     Diabetes Father     Obesity Father     Colon Cancer Neg Hx        Review of Systems   Constitutional: Negative. Negative for chills and fever. HENT: Positive for congestion (sinus), postnasal drip and tinnitus (bilateral ). Negative for sore throat. [place] right > left   Eyes: Negative. Negative for visual disturbance. IOL OU. Hx retinal bleeds trx with Laser. Respiratory: Negative for cough, chest tightness, shortness of breath, wheezing and stridor. Cardiovascular: Negative for chest pain and palpitations. Gastrointestinal: Positive for constipation (prone). Negative for abdominal pain, diarrhea, nausea and vomiting. Endocrine: Negative. Genitourinary: Negative for dysuria and frequency. Dx bladder cancer   Musculoskeletal: Positive for back pain (low back pain). Negative for myalgias and neck pain. Skin: Negative. Allergic/Immunologic: Negative. Neurological: Negative. Negative for seizures and headaches. Hematological: Negative. Psychiatric/Behavioral: Negative. Vitals:  [facility] 06/30/1980     Physical Exam  Vitals reviewed. HENT:      Head: Normocephalic and atraumatic. Right Ear: External ear normal. There is impacted cerumen. Left Ear: External ear normal. There is impacted cerumen. Nose: Nose normal. No congestion. Mouth/Throat:      Mouth: Mucous membranes are dry. Eyes:      General: No scleral icterus. Extraocular Movements: Extraocular movements intact. Conjunctiva/sclera: Conjunctivae normal.      Pupils: Pupils are equal, round, and reactive to light. Comments: IOL OU. Neck:      Vascular: Carotid bruit (bilateral with right > left.) present. Cardiovascular:      Rate and Rhythm: Normal rate and regular rhythm. Heart sounds: No murmur heard. No friction rub. No gallop. Pulmonary:      Effort: Pulmonary effort is normal. No respiratory distress. Breath sounds: Normal breath sounds. No wheezing or rales. Comments: Dry smokers cough. Abdominal:      General: Bowel sounds are normal.      Palpations: Abdomen is soft. There is no mass. Tenderness: There is no abdominal tenderness. Genitourinary:     Comments: Deferred. Musculoskeletal:         General: Normal range of motion. Cervical back: Normal range of motion and neck supple. Right lower leg: No edema. Left lower leg: No edema. Comments: Thoracic spine rounding. Skin:     General: Skin is warm and dry. Neurological:      Mental Status: She is alert and oriented to person, place, and time. Gait: Gait abnormal (ambulates with cane with upper body flexed forward. ). Psychiatric:         Mood and Affect: Mood normal.         Behavior: Behavior normal.         Thought Content:  Thought content normal.         Judgment: Judgment normal.         [unfilled]    Assessment:  Patient Active Problem List   Diagnosis    Cancer ([address]    Cecal cancer ([address]    Seasonal allergies    Blood in stool    Gastric ulcer without hemorrhage, perforation, or obstruction    Personal history of colon cancer    Diverticulosis of large intestine without diverticulitis    Bladder tumor    Vitamin D deficiency    Tobacco use disorder    Malignant neoplasm of posterior wall of urinary bladder ([facility]    Bladder cancer ([facility] [facility]    History of bladder cancer    SBO (small bowel obstruction) (HCC)    Adenomatous polyp of sigmoid colon    Adenomatous polyp of descending colon         Plan:  Scheduled for cystoscopy, biopsy, fulguration possible right retrograde pyelogram.      Brandon Lerner, SUSY - CNP  2/24/2022  9:30 AM

## 2022-03-02 ENCOUNTER — ANESTHESIA EVENT (OUTPATIENT)
Dept: OPERATING ROOM | Age: 87
End: 2022-03-02
Payer: MEDICARE

## 2022-03-02 ENCOUNTER — HOSPITAL ENCOUNTER (OUTPATIENT)
Age: 87
Setting detail: OUTPATIENT SURGERY
Discharge: HOME | End: 2022-03-02
Attending: UROLOGY | Admitting: UROLOGY
Payer: MEDICARE

## 2022-03-02 ENCOUNTER — HOSPITAL ENCOUNTER (OUTPATIENT)
Dept: GENERAL RADIOLOGY | Age: 87
Setting detail: OUTPATIENT SURGERY
Discharge: HOME | End: 2022-03-04
Attending: UROLOGY
Payer: MEDICARE

## 2022-03-02 ENCOUNTER — ANESTHESIA (OUTPATIENT)
Dept: OPERATING ROOM | Age: 87
End: 2022-03-02
Payer: MEDICARE

## 2022-03-02 VITALS — TEMPERATURE: 94.8 F | OXYGEN SATURATION: 99 % | SYSTOLIC BLOOD PRESSURE: 98 MMHG | DIASTOLIC BLOOD PRESSURE: 52 MMHG

## 2022-03-02 VITALS
DIASTOLIC BLOOD PRESSURE: 73 MMHG | TEMPERATURE: 97.5 F | BODY MASS INDEX: 28.89 KG/M2 | OXYGEN SATURATION: 95 % | RESPIRATION RATE: 16 BRPM | WEIGHT: 153 LBS | HEIGHT: 61 IN | HEART RATE: 66 BPM | SYSTOLIC BLOOD PRESSURE: 168 MMHG

## 2022-03-02 DIAGNOSIS — R52 PAIN: ICD-10-CM

## 2022-03-02 PROCEDURE — 7100000010 HC PHASE II RECOVERY - FIRST 15 MIN: Performed by: UROLOGY

## 2022-03-02 PROCEDURE — 3700000001 HC ADD 15 MINUTES (ANESTHESIA): Performed by: UROLOGY

## 2022-03-02 PROCEDURE — C1769 GUIDE WIRE: HCPCS | Performed by: UROLOGY

## 2022-03-02 PROCEDURE — 3600000004 HC SURGERY LEVEL 4 BASE: Performed by: UROLOGY

## 2022-03-02 PROCEDURE — 2709999900 HC NON-CHARGEABLE SUPPLY: Performed by: UROLOGY

## 2022-03-02 PROCEDURE — 3600000014 HC SURGERY LEVEL 4 ADDTL 15MIN: Performed by: UROLOGY

## 2022-03-02 PROCEDURE — 6360000002 HC RX W HCPCS: Performed by: NURSE ANESTHETIST, CERTIFIED REGISTERED

## 2022-03-02 PROCEDURE — 2580000003 HC RX 258: Performed by: UROLOGY

## 2022-03-02 PROCEDURE — 52000 CYSTOURETHROSCOPY: CPT | Performed by: UROLOGY

## 2022-03-02 PROCEDURE — 7100000011 HC PHASE II RECOVERY - ADDTL 15 MIN: Performed by: UROLOGY

## 2022-03-02 PROCEDURE — 7100000000 HC PACU RECOVERY - FIRST 15 MIN: Performed by: UROLOGY

## 2022-03-02 PROCEDURE — 7100000001 HC PACU RECOVERY - ADDTL 15 MIN: Performed by: UROLOGY

## 2022-03-02 PROCEDURE — C1758 CATHETER, URETERAL: HCPCS | Performed by: UROLOGY

## 2022-03-02 PROCEDURE — 6370000000 HC RX 637 (ALT 250 FOR IP): Performed by: UROLOGY

## 2022-03-02 PROCEDURE — 2500000003 HC RX 250 WO HCPCS: Performed by: NURSE ANESTHETIST, CERTIFIED REGISTERED

## 2022-03-02 PROCEDURE — 2580000003 HC RX 258: Performed by: NURSE ANESTHETIST, CERTIFIED REGISTERED

## 2022-03-02 PROCEDURE — 3700000000 HC ANESTHESIA ATTENDED CARE: Performed by: UROLOGY

## 2022-03-02 RX ORDER — SODIUM CHLORIDE, SODIUM LACTATE, POTASSIUM CHLORIDE, CALCIUM CHLORIDE 600; 310; 30; 20 MG/100ML; MG/100ML; MG/100ML; MG/100ML
INJECTION, SOLUTION INTRAVENOUS CONTINUOUS PRN
Status: DISCONTINUED | OUTPATIENT
Start: 2022-03-02 | End: 2022-03-02 | Stop reason: SDUPTHER

## 2022-03-02 RX ORDER — GENTAMICIN SULFATE 40 MG/ML
INJECTION, SOLUTION INTRAMUSCULAR; INTRAVENOUS PRN
Status: DISCONTINUED | OUTPATIENT
Start: 2022-03-02 | End: 2022-03-02 | Stop reason: SDUPTHER

## 2022-03-02 RX ORDER — MAGNESIUM HYDROXIDE 1200 MG/15ML
LIQUID ORAL PRN
Status: DISCONTINUED | OUTPATIENT
Start: 2022-03-02 | End: 2022-03-02 | Stop reason: ALTCHOICE

## 2022-03-02 RX ORDER — LIDOCAINE HYDROCHLORIDE 20 MG/ML
INJECTION, SOLUTION EPIDURAL; INFILTRATION; INTRACAUDAL; PERINEURAL PRN
Status: DISCONTINUED | OUTPATIENT
Start: 2022-03-02 | End: 2022-03-02 | Stop reason: SDUPTHER

## 2022-03-02 RX ORDER — PROPOFOL 10 MG/ML
INJECTION, EMULSION INTRAVENOUS PRN
Status: DISCONTINUED | OUTPATIENT
Start: 2022-03-02 | End: 2022-03-02 | Stop reason: SDUPTHER

## 2022-03-02 RX ORDER — ONDANSETRON 2 MG/ML
INJECTION INTRAMUSCULAR; INTRAVENOUS PRN
Status: DISCONTINUED | OUTPATIENT
Start: 2022-03-02 | End: 2022-03-02 | Stop reason: SDUPTHER

## 2022-03-02 RX ORDER — LIDOCAINE HYDROCHLORIDE 20 MG/ML
JELLY TOPICAL PRN
Status: DISCONTINUED | OUTPATIENT
Start: 2022-03-02 | End: 2022-03-02 | Stop reason: ALTCHOICE

## 2022-03-02 RX ADMIN — PROPOFOL 40 MG: 10 INJECTION, EMULSION INTRAVENOUS at 10:40

## 2022-03-02 RX ADMIN — LIDOCAINE HYDROCHLORIDE 60 MG: 20 INJECTION, SOLUTION EPIDURAL; INFILTRATION; INTRACAUDAL; PERINEURAL at 10:39

## 2022-03-02 RX ADMIN — PROPOFOL 80 MG: 10 INJECTION, EMULSION INTRAVENOUS at 10:39

## 2022-03-02 RX ADMIN — GENTAMICIN SULFATE 120 MG: 40 INJECTION, SOLUTION INTRAMUSCULAR; INTRAVENOUS at 10:46

## 2022-03-02 RX ADMIN — ONDANSETRON 4 MG: 2 INJECTION INTRAMUSCULAR; INTRAVENOUS at 10:55

## 2022-03-02 RX ADMIN — SODIUM CHLORIDE, POTASSIUM CHLORIDE, SODIUM LACTATE AND CALCIUM CHLORIDE: 600; 310; 30; 20 INJECTION, SOLUTION INTRAVENOUS at 10:31

## 2022-03-02 ASSESSMENT — PULMONARY FUNCTION TESTS
PIF_VALUE: 1
PIF_VALUE: 0
PIF_VALUE: 0
PIF_VALUE: 16
PIF_VALUE: 13
PIF_VALUE: 0
PIF_VALUE: 12
PIF_VALUE: 11
PIF_VALUE: 11
PIF_VALUE: 13
PIF_VALUE: 12
PIF_VALUE: 11
PIF_VALUE: 13
PIF_VALUE: 0
PIF_VALUE: 12
PIF_VALUE: 11
PIF_VALUE: 12
PIF_VALUE: 12
PIF_VALUE: 4
PIF_VALUE: 1
PIF_VALUE: 1
PIF_VALUE: 11
PIF_VALUE: 0
PIF_VALUE: 5
PIF_VALUE: 2
PIF_VALUE: 13
PIF_VALUE: 12
PIF_VALUE: 0
PIF_VALUE: 2
PIF_VALUE: 3
PIF_VALUE: 15

## 2022-03-02 ASSESSMENT — PAIN - FUNCTIONAL ASSESSMENT: PAIN_FUNCTIONAL_ASSESSMENT: 0-10

## 2022-03-02 NOTE — OP NOTE
[facility] [address] [place] [address], [address] [place] [address]                                OPERATIVE REPORT    PATIENT NAME: Tracy Alexander                   :        1935  MED REC NO:   [id number]                            ROOM:  ACCOUNT NO:   [de-identified]                           ADMIT DATE: 2022  PROVIDER:     Can Almanza MD    DATE OF PROCEDURE:  2022    PREOPERATIVE DIAGNOSIS:  History of bladder cancer. POSTOPERATIVE DIAGNOSIS:  No evidence of recurrent disease. OPERATION:  Cystoscopy. SURGEON:  Elvira Velazquez. Jacque Pressley MD    ANESTHESIA:  General.    ESTIMATED BLOOD LOSS:  Not applicable. INDICATIONS:  The patient is an 82-year-old female who underwent  transurethral resection of multifocal bladder lesions that were  consistent with invasive high-grade bladder cancer to lamina propria  with no evidence of muscularis invasion. The patient now comes in for  rebiopsy and possible fulguration. OPERATIVE NOTE:  The patient received preoperative IV antibiotics. She  was taken to the operating room and placed on the operating table in  dorsal lithotomy position. She underwent general anesthetic. A  21-Cymraes cystoscope was used to perform a cystoscopy. This was  followed by a cystoscopy performed with a 70-degree lens. Examination  of bladder showed well-healed previous areas of resection. There was no  evidence of recurrent bladder lesions. No biopsies had to be performed. The patient will be scheduled for cystoscopy in 3 months. The patient  tolerated the procedure well. There were no complications.         Ayaka Schultz MD    D: 2022 10:59:22       T: 2022 11:02:28     [email or web address]  Job#: [id number]     Doc#: [id number]    CC:

## 2022-03-02 NOTE — ANESTHESIA POSTPROCEDURE EVALUATION
Department of Anesthesiology  Postprocedure Note    Patient: Paresh Vargas  MRN: [id number]  YOB: 1935  Date of evaluation: 3/2/2022  Time:  11:11 AM     Procedure Summary     Date: 03/02/22 Room / Location: [facility]    Anesthesia Start: 1031 Anesthesia Stop: 9623    Procedure: CYSTOSCOPY (N/A ) Diagnosis: (BLADDER CANCER)    Surgeons: Giselle Velazquez MD Responsible Provider: Cher Domínguez MD    Anesthesia Type: general ASA Status: 2          Anesthesia Type: general    Dorian Phase I: Dorian Score: 9    Dorian Phase II:      Last vitals: Reviewed and per EMR flowsheets.        Anesthesia Post Evaluation    Patient location during evaluation: bedside  Patient participation: complete - patient participated  Level of consciousness: awake and awake and alert  Pain score: 0  Airway patency: patent  Nausea & Vomiting: no nausea and no vomiting  Complications: no  Cardiovascular status: blood pressure returned to baseline and hemodynamically stable  Respiratory status: acceptable  Hydration status: euvolemic

## 2022-03-02 NOTE — ANESTHESIA PRE PROCEDURE
Department of Anesthesiology  Preprocedure Note       Name:  Dave Velásquez   Age:  82 y.o.  :  1935                                          MRN:  [id number]         Date:  3/2/2022      Surgeon: Nemo Mccrary):  Emmanuel Medellin MD    Procedure: Procedure(s):  CYSTOSCOPY BIOPSY FULGURATION  POSSIBLE RIGHT  RETROGRADE PYELOGRAM    Medications prior to admission:   Prior to Admission medications    Medication Sig Start Date End Date Taking? Authorizing Provider   Multiple Vitamins-Minerals (PRESERVISION AREDS PO) Take by mouth    Historical Provider, MD   polyethylene glycol (GLYCOLAX) 17 GM/SCOOP powder Take 17 g by mouth daily    Historical Provider, MD   b complex vitamins capsule Take 1 capsule by mouth daily    Historical Provider, MD   Cholecalciferol (VITAMIN D3) 50 MCG (2000 UT) CAPS Take by mouth daily    Historical Provider, MD   Omega-3 Fatty Acids (FISH OIL) 1200 MG CAPS Take by mouth daily    Historical Provider, MD   traMADol (ULTRAM) 50 MG tablet Take 25 mg by mouth every 8 hours. 6/10/20   Historical Provider, MD   Probiotic Product ([address]) Take 1 capsule by mouth every evening     Historical Provider, MD   Loratadine (CLARITIN PO) Take 1 tablet by mouth daily     Historical Provider, MD   triamterene-hydrochlorothiazide (MAXZIDE-25) 37.5-25 MG per tablet Take 1 tablet by mouth daily. Historical Provider, MD       Current medications:    No current facility-administered medications for this encounter. Allergies:     Allergies   Allergen Reactions    Bacitracin Swelling    Seasonal      Sinus sx    Statins      Abdominal pain       Problem List:    Patient Active Problem List   Diagnosis Code    Cancer ([address] C80.1    [facility] C18.0    Seasonal allergies J30.2    Blood in stool K92.1    Gastric ulcer without hemorrhage, perforation, or obstruction K25.9    Personal history of colon cancer Z85.038    Diverticulosis of large intestine without diverticulitis K57.30    Bladder tumor D49.4    Vitamin D deficiency E55.9    Tobacco use disorder F17.200    Malignant neoplasm of posterior wall of urinary bladder (HCC) C67.4    Bladder cancer (HCC) C67.9    History of bladder cancer Z85.51    SBO (small bowel obstruction) ([address] K56.609    Adenomatous polyp of sigmoid colon D12.5    Adenomatous polyp of descending colon D12.4       Past Medical History:        Diagnosis Date    Allergic rhinitis     Arthritis     [facility] ([facility]     RT BREAST--dx 1986--oral chemo -- OR    Cecal cancer ([address]     dx 2002--chemo    Diverticulitis     HTN (hypertension)     meds > 20 yrs    Hypercholesterolemia     past trx with meds -- off meds > 5 yrs    Seasonal allergies     Small bowel obstruction (HCC)     Tinnitus 03/02/2022       Past Surgical History:        Procedure Laterality Date    APPENDECTOMY      age 37s   Murrel Neither BREAST SURGERY Right 1987    mastectomy   [facility] COLONOSCOPY  4/09 & 1/11    COLONOSCOPY  1/9/13,1/10/11          COLONOSCOPY  1/21/15    polypectomy     COLONOSCOPY N/A 5/17/2021    COLORECTAL CANCER SCREENING, HIGH RISK performed by Kale Felix MD at [facility] 7/8/2020    CYSTOSCOPY TURBT RESECTOSCOPE performed by Jack Irving MD at [facility] N/A 1/12/2022    CYSTOSCOPY BILATERAL PYELOGRAM performed by Jack Irving MD at [facility] N/A 1/12/2022    BIOPSY OF BLADDER performed by Jack Irving MD at [place] [address] N/A 9/9/2020    CYSTOSCOPY BLADDER BIOPSY FULGURATION performed by Jack Irving MD at  [address], COLON, DIAGNOSTIC      EYE SURGERY      Phaco with IOL OU    EYE SURGERY      Lasik OU    HERNIA REPAIR Right 12/11    ING.  HERNIA REPAIN W/ MESH    MASTECTOMY Right 1987    MASTECTOMY, MODIFIED RADICAL Right 1987    OTHER SURGICAL HISTORY EXC. BACK SKIN CYST    OTHER SURGICAL HISTORY  12/15/2009    PORT REMOVAL    ID COLONOSCOPY FLX DX W/COLLJ SPEC WHEN PFRMD N/A 3/30/2018    [facility] ESOPHAGOGASTRODUODENOSCOPY TRANSORAL DIAGNOSTIC N/A 3/30/2018    EGD ESOPHAGOGASTRODUODENOSCOPY performed by Charles Morgan MD at [facility]  04/28/09    CECAL CANCER    TUNNELED VENOUS PORT PLACEMENT  06/11/09    REMOVAL 12/09       Social History:    Social History     Tobacco Use    Smoking status: Current Every Day Smoker     Packs/day: 0.25     Years: 50.00     Pack years: 12.50     Types: Cigarettes     Start date: 2/24/1958    Smokeless tobacco: Never Used    Tobacco comment: 1/2-1 pack per week--past 1 ppd   Substance Use Topics    Alcohol use: No                                Ready to quit: Not Answered  Counseling given: Not Answered  Comment: 1/2-1 pack per week--past 1 ppd      Vital Signs (Current):   Vitals:    03/02/22 0845   BP: (!) 170/71   Pulse: 66   Resp: 16   Temp: 98.7 °F (37.1 °C)   TempSrc: Skin   SpO2: 96%   Weight: 153 lb (69.4 kg)   Height: 5' 1\" (1.549 m)                                              BP Readings from Last 3 Encounters:   03/02/22 (!) 170/71   02/24/22 (!) 166/71   01/12/22 (!) 160/69       NPO Status: Time of last liquid consumption: 1800                        Time of last solid consumption: 2100                        Date of last liquid consumption: 03/01/22                        Date of last solid food consumption: 03/01/22    BMI:   Wt Readings from Last 3 Encounters:   03/02/22 153 lb (69.4 kg)   02/24/22 153 lb (69.4 kg)   01/12/22 150 lb 6.4 oz (68.2 kg)     Body mass index is 28.91 kg/m².     CBC:   Lab Results   Component Value Date    WBC 4.6 02/24/2022    RBC 4.49 02/24/2022    RBC 3.73 01/01/2012    HGB 14.3 02/24/2022    HCT 42.1 02/24/2022    MCV 93.9 02/24/2022    RDW 13.5 02/24/2022     02/24/2022       CMP:   Lab Results   Component Value Date     02/24/2022    K 4.5 02/24/2022     02/24/2022    CO2 31 02/24/2022    BUN 19 02/24/2022    CREATININE 0.94 02/24/2022    GFRAA >60.0 02/24/2022    LABGLOM 56.6 02/24/2022    GLUCOSE 111 02/24/2022    GLUCOSE 138 01/01/2012    PROT 7.9 11/15/2021    CALCIUM 9.9 02/24/2022    BILITOT 0.6 11/15/2021    ALKPHOS 110 11/15/2021    AST 22 11/15/2021    ALT 15 11/15/2021       POC Tests: No results for input(s): POCGLU, POCNA, POCK, POCCL, POCBUN, POCHEMO, POCHCT in the last 72 hours. Coags:   Lab Results   Component Value Date    PROTIME 10.1 11/10/2013    INR 1.0 11/10/2013    APTT 32.8 11/10/2013       HCG (If Applicable): No results found for: PREGTESTUR, PREGSERUM, HCG, HCGQUANT     ABGs: No results found for: PHART, PO2ART, ALP3ZJP, YIO5QVD, BEART, W4JSVJBF     Type & Screen (If Applicable):  No results found for: LABABO, LABRH    Drug/Infectious Status (If Applicable):  No results found for: HIV, HEPCAB    COVID-19 Screening (If Applicable):   Lab Results   Component Value Date    COVID19 Not Detected 02/24/2022           Anesthesia Evaluation  Patient summary reviewed and Nursing notes reviewed no history of anesthetic complications:   Airway: Mallampati: II  TM distance: >3 FB   Neck ROM: full  Mouth opening: > = 3 FB Dental: normal exam         Pulmonary:Negative Pulmonary ROS and normal exam                               Cardiovascular:  Exercise tolerance: good (>4 METS),   (+) hypertension:,       ECG reviewed               Beta Blocker:  Not on Beta Blocker         Neuro/Psych:   Negative Neuro/Psych ROS              GI/Hepatic/Renal: Neg GI/Hepatic/Renal ROS  (+) PUD,           Endo/Other: Negative Endo/Other ROS             Pt had PAT visit. Abdominal:             Vascular: negative vascular ROS. Other Findings:             Anesthesia Plan      general     ASA 2     (LMA)  Induction: intravenous.     MIPS: Postoperative opioids intended and Prophylactic antiemetics administered. Anesthetic plan and risks discussed with patient. Plan discussed with CRNA.     Attending anesthesiologist reviewed and agrees with Pre Eval content              Jonn Cunningham MD   3/2/2022

## 2022-03-02 NOTE — BRIEF OP NOTE
Brief Postoperative Note      Patient: Kathleen Holcomb  YOB: 1935  MRN: [id number]    Date of Procedure: 3/2/2022    Pre-Op Diagnosis: BLADDER CANCER    Post-Op Diagnosis: No evidence of recurrent disease       Procedure(s):  CYSTOSCOPY    Surgeon(s):  Hossein Villagran MD    Assistant:  * No surgical staff found *    Anesthesia: General    Estimated Blood Loss (mL): Minimal    Complications: None    Specimens:   * No specimens in log *    Implants:  * No implants in log *      Drains: * No LDAs found *    Findings: No evidence of recurrent disease    Electronically signed by Hossein Villagran MD on 3/2/2022 at 10:56 AM

## 2022-03-03 NOTE — OP NOTE
[facility]asia Sheehan [facility] [address] [place] [address], [address] [place] [address]                                OPERATIVE REPORT    PATIENT NAME: Yue Olmstead                   :        1935  MED REC NO:   [id number]                            ROOM:  ACCOUNT NO:   [de-identified]                           ADMIT DATE: 2022  PROVIDER:     Vania Anderson MD    DATE OF PROCEDURE:  2022    PREOPERATIVE DIAGNOSIS:  History of bladder cancer. POSTOPERATIVE DIAGNOSIS:  History of bladder cancer. OPERATION:  Cystoscopy. SURGEON:  Amol French. Jayro Officer, MD    ANESTHESIA:  General.    ESTIMATED BLOOD LOSS:  Not applicable. INDICATIONS:  The patient is an 57-year-old female who comes in for  cystoscopic evaluation with potential rebiopsy of areas of previous  resection. 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 The patient will undergo cystoscopy, possible biopsies. OPERATIVE NOTE:  The patient received preoperative IV antibiotics. She  was taken to the operating room, placed on the operating table in dorsal  lithotomy position. General anesthetic was induced. Time-out was  taken. The patient received gentamicin IV. Then a 21-Maori cystoscope  was used to perform a cystoscopy. Cystoscopic evaluation showed no  evidence of tumors, stones, and/or other abnormalities. Previous areas  of resection were closely examined and no recurrent disease was noted. At this point, there was no indication for biopsy. Bladder was then  drained. She was discharged to the recovery room in stable condition. The patient tolerated the procedure well. There were no complications. The patient will undergo office cystoscopy in 3 months.         Brenna Craig MD    D: 2022 14:53:44       T: 2022 14:57:04     [email or web address]  Job#: [id number]     Doc#: [id number]    CC:

## 2022-06-06 ENCOUNTER — PROCEDURE VISIT (OUTPATIENT)
Dept: UROLOGY | Age: 87
End: 2022-06-06
Payer: MEDICARE

## 2022-06-06 VITALS
SYSTOLIC BLOOD PRESSURE: 138 MMHG | HEART RATE: 82 BPM | DIASTOLIC BLOOD PRESSURE: 76 MMHG | WEIGHT: 155 LBS | BODY MASS INDEX: 28.52 KG/M2 | HEIGHT: 62 IN | OXYGEN SATURATION: 96 %

## 2022-06-06 DIAGNOSIS — C67.4 MALIGNANT NEOPLASM OF POSTERIOR WALL OF URINARY BLADDER (HCC): ICD-10-CM

## 2022-06-06 DIAGNOSIS — Z85.51 HISTORY OF BLADDER CANCER: Primary | ICD-10-CM

## 2022-06-06 PROCEDURE — 52000 CYSTOURETHROSCOPY: CPT | Performed by: UROLOGY

## 2022-06-06 PROCEDURE — 81003 URINALYSIS AUTO W/O SCOPE: CPT | Performed by: UROLOGY

## 2022-06-06 RX ORDER — SULFAMETHOXAZOLE AND TRIMETHOPRIM 800; 160 MG/1; MG/1
1 TABLET ORAL ONCE
Qty: 1 TABLET | Refills: 0 | COMMUNITY
Start: 2022-06-06 | End: 2022-06-06

## 2023-01-23 ENCOUNTER — PROCEDURE VISIT (OUTPATIENT)
Dept: UROLOGY | Age: 88
End: 2023-01-23
Payer: MEDICARE

## 2023-01-23 VITALS
OXYGEN SATURATION: 97 % | HEIGHT: 62 IN | SYSTOLIC BLOOD PRESSURE: 138 MMHG | HEART RATE: 91 BPM | BODY MASS INDEX: 27.42 KG/M2 | DIASTOLIC BLOOD PRESSURE: 82 MMHG | WEIGHT: 149 LBS

## 2023-01-23 DIAGNOSIS — Z85.51 HISTORY OF BLADDER CANCER: Primary | ICD-10-CM

## 2023-01-23 LAB
BILIRUBIN, POC: ABNORMAL
BLOOD URINE, POC: ABNORMAL
CLARITY, POC: CLEAR
COLOR, POC: YELLOW
GLUCOSE URINE, POC: ABNORMAL
KETONES, POC: ABNORMAL
LEUKOCYTE EST, POC: ABNORMAL
NITRITE, POC: ABNORMAL
PH, POC: 7
PROTEIN, POC: ABNORMAL
SPECIFIC GRAVITY, POC: 1.01
UROBILINOGEN, POC: 1

## 2023-01-23 PROCEDURE — 81003 URINALYSIS AUTO W/O SCOPE: CPT | Performed by: UROLOGY

## 2023-01-23 PROCEDURE — 52000 CYSTOURETHROSCOPY: CPT | Performed by: UROLOGY

## 2023-01-23 PROCEDURE — 99999 PR OFFICE/OUTPT VISIT,PROCEDURE ONLY: CPT | Performed by: UROLOGY

## 2023-01-23 RX ORDER — SULFAMETHOXAZOLE AND TRIMETHOPRIM 800; 160 MG/1; MG/1
1 TABLET ORAL ONCE
Qty: 1 TABLET | Refills: 0 | COMMUNITY
Start: 2023-01-23 | End: 2023-01-23

## 2023-01-23 NOTE — PROGRESS NOTES
Urology  History of superficial high-grade bladder cancer with no evidence of recurrence over the past 2 years  Cystoscopy 6 months ago showed no evidence of recurrent disease    Flexible cystoscopy/local anesthetic/premedicated with antibiotic  Normal urethra  Normal bladder neck  Clear urine from both ureters  No evidence of tumors, stones, and other abnormalities  No evidence of recurrent urothelial carcinoma  Surveillance cystoscopy will be this 1 year per patient request instead of 6 months  Guru Mckee MD

## 2025-01-24 ENCOUNTER — APPOINTMENT (OUTPATIENT)
Dept: CT IMAGING | Age: 89
DRG: 057 | End: 2025-01-24
Payer: MEDICARE

## 2025-01-24 ENCOUNTER — APPOINTMENT (OUTPATIENT)
Dept: GENERAL RADIOLOGY | Age: 89
DRG: 057 | End: 2025-01-24
Payer: MEDICARE

## 2025-01-24 ENCOUNTER — HOSPITAL ENCOUNTER (INPATIENT)
Age: 89
LOS: 5 days | Discharge: HOME | DRG: 057 | End: 2025-02-02
Attending: STUDENT IN AN ORGANIZED HEALTH CARE EDUCATION/TRAINING PROGRAM | Admitting: STUDENT IN AN ORGANIZED HEALTH CARE EDUCATION/TRAINING PROGRAM
Payer: MEDICARE

## 2025-01-24 DIAGNOSIS — R41.82 ALTERED MENTAL STATUS, UNSPECIFIED ALTERED MENTAL STATUS TYPE: Primary | ICD-10-CM

## 2025-01-24 DIAGNOSIS — R44.3 HALLUCINATIONS: ICD-10-CM

## 2025-01-24 LAB
ALBUMIN SERPL-MCNC: 4.1 G/DL (ref 3.5–4.6)
ALP SERPL-CCNC: 85 U/L (ref 40–130)
ALT SERPL-CCNC: 17 U/L (ref 0–33)
ANION GAP SERPL CALCULATED.3IONS-SCNC: 11 MEQ/L (ref 9–15)
APTT PPP: 42.2 SEC (ref 24.4–36.8)
AST SERPL-CCNC: 26 U/L (ref 0–35)
BACTERIA URNS QL MICRO: NEGATIVE /HPF
BASOPHILS # BLD: 0 K/UL (ref 0–0.2)
BASOPHILS NFR BLD: 0.2 %
BILIRUB SERPL-MCNC: 0.7 MG/DL (ref 0.2–0.7)
BILIRUB UR QL STRIP: NEGATIVE
BUN SERPL-MCNC: 23 MG/DL (ref 8–23)
CALCIUM SERPL-MCNC: 9 MG/DL (ref 8.5–9.9)
CHLORIDE SERPL-SCNC: 104 MEQ/L (ref 95–107)
CK SERPL-CCNC: 153 U/L (ref 0–170)
CLARITY UR: CLEAR
CO2 SERPL-SCNC: 27 MEQ/L (ref 20–31)
COLOR UR: YELLOW
CREAT SERPL-MCNC: 0.63 MG/DL (ref 0.5–0.9)
EKG ATRIAL RATE: 67 BPM
EKG P AXIS: 79 DEGREES
EKG P-R INTERVAL: 204 MS
EKG Q-T INTERVAL: 422 MS
EKG QRS DURATION: 80 MS
EKG QTC CALCULATION (BAZETT): 445 MS
EKG R AXIS: 48 DEGREES
EKG T AXIS: 51 DEGREES
EKG VENTRICULAR RATE: 67 BPM
EOSINOPHIL # BLD: 0.1 K/UL (ref 0–0.7)
EOSINOPHIL NFR BLD: 1.1 %
EPI CELLS #/AREA URNS AUTO: ABNORMAL /HPF (ref 0–5)
ERYTHROCYTE [DISTWIDTH] IN BLOOD BY AUTOMATED COUNT: 13.6 % (ref 11.5–14.5)
GLOBULIN SER CALC-MCNC: 3 G/DL (ref 2.3–3.5)
GLUCOSE SERPL-MCNC: 99 MG/DL (ref 70–99)
GLUCOSE UR STRIP-MCNC: NEGATIVE MG/DL
HCT VFR BLD AUTO: 41.5 % (ref 37–47)
HGB BLD-MCNC: 13.4 G/DL (ref 12–16)
HGB UR QL STRIP: ABNORMAL
HYALINE CASTS #/AREA URNS AUTO: ABNORMAL /HPF (ref 0–5)
INR PPP: 1
KETONES UR STRIP-MCNC: 15 MG/DL
LEUKOCYTE ESTERASE UR QL STRIP: NEGATIVE
LYMPHOCYTES # BLD: 0.5 K/UL (ref 1–4.8)
LYMPHOCYTES NFR BLD: 9.1 %
MCH RBC QN AUTO: 30.6 PG (ref 27–31.3)
MCHC RBC AUTO-ENTMCNC: 32.3 % (ref 33–37)
MCV RBC AUTO: 94.7 FL (ref 79.4–94.8)
MONOCYTES # BLD: 0.5 K/UL (ref 0.2–0.8)
MONOCYTES NFR BLD: 9.5 %
NEUTROPHILS # BLD: 4.4 K/UL (ref 1.4–6.5)
NEUTS SEG NFR BLD: 79.6 %
NITRITE UR QL STRIP: NEGATIVE
PH UR STRIP: 5.5 [PH] (ref 5–9)
PLATELET # BLD AUTO: 189 K/UL (ref 130–400)
POTASSIUM SERPL-SCNC: 3.4 MEQ/L (ref 3.4–4.9)
PROT SERPL-MCNC: 7.1 G/DL (ref 6.3–8)
PROT UR STRIP-MCNC: ABNORMAL MG/DL
PROTHROMBIN TIME: 13.5 SEC (ref 12.3–14.9)
RBC # BLD AUTO: 4.38 M/UL (ref 4.2–5.4)
RBC #/AREA URNS AUTO: ABNORMAL /HPF (ref 0–5)
SODIUM SERPL-SCNC: 142 MEQ/L (ref 135–144)
SP GR UR STRIP: 1.02 (ref 1–1.03)
TROPONIN, HIGH SENSITIVITY: 13 NG/L (ref 0–19)
TROPONIN, HIGH SENSITIVITY: 13 NG/L (ref 0–19)
URINE REFLEX TO CULTURE: ABNORMAL
UROBILINOGEN UR STRIP-ACNC: 0.2 E.U./DL
WBC # BLD AUTO: 5.5 K/UL (ref 4.8–10.8)
WBC #/AREA URNS AUTO: ABNORMAL /HPF (ref 0–5)

## 2025-01-24 PROCEDURE — 73552 X-RAY EXAM OF FEMUR 2/>: CPT

## 2025-01-24 PROCEDURE — 84484 ASSAY OF TROPONIN QUANT: CPT

## 2025-01-24 PROCEDURE — 85025 COMPLETE CBC W/AUTO DIFF WBC: CPT

## 2025-01-24 PROCEDURE — 85610 PROTHROMBIN TIME: CPT

## 2025-01-24 PROCEDURE — 72125 CT NECK SPINE W/O DYE: CPT

## 2025-01-24 PROCEDURE — 70450 CT HEAD/BRAIN W/O DYE: CPT

## 2025-01-24 PROCEDURE — 72170 X-RAY EXAM OF PELVIS: CPT

## 2025-01-24 PROCEDURE — 73110 X-RAY EXAM OF WRIST: CPT

## 2025-01-24 PROCEDURE — 6370000000 HC RX 637 (ALT 250 FOR IP): Performed by: INTERNAL MEDICINE

## 2025-01-24 PROCEDURE — 82550 ASSAY OF CK (CPK): CPT

## 2025-01-24 PROCEDURE — 71045 X-RAY EXAM CHEST 1 VIEW: CPT

## 2025-01-24 PROCEDURE — G0378 HOSPITAL OBSERVATION PER HR: HCPCS

## 2025-01-24 PROCEDURE — 93005 ELECTROCARDIOGRAM TRACING: CPT | Performed by: PHYSICIAN ASSISTANT

## 2025-01-24 PROCEDURE — 80053 COMPREHEN METABOLIC PANEL: CPT

## 2025-01-24 PROCEDURE — 85730 THROMBOPLASTIN TIME PARTIAL: CPT

## 2025-01-24 PROCEDURE — 81001 URINALYSIS AUTO W/SCOPE: CPT

## 2025-01-24 PROCEDURE — 99285 EMERGENCY DEPT VISIT HI MDM: CPT

## 2025-01-24 RX ORDER — ENOXAPARIN SODIUM 100 MG/ML
40 INJECTION SUBCUTANEOUS DAILY
Status: DISCONTINUED | OUTPATIENT
Start: 2025-01-24 | End: 2025-02-02 | Stop reason: HOSPADM

## 2025-01-24 RX ORDER — ACETAMINOPHEN 650 MG/1
650 SUPPOSITORY RECTAL EVERY 6 HOURS PRN
Status: DISCONTINUED | OUTPATIENT
Start: 2025-01-24 | End: 2025-02-02 | Stop reason: HOSPADM

## 2025-01-24 RX ORDER — MAGNESIUM SULFATE IN WATER 40 MG/ML
2000 INJECTION, SOLUTION INTRAVENOUS PRN
Status: DISCONTINUED | OUTPATIENT
Start: 2025-01-24 | End: 2025-02-02 | Stop reason: HOSPADM

## 2025-01-24 RX ORDER — SODIUM CHLORIDE 9 MG/ML
INJECTION, SOLUTION INTRAVENOUS PRN
Status: DISCONTINUED | OUTPATIENT
Start: 2025-01-24 | End: 2025-02-02 | Stop reason: HOSPADM

## 2025-01-24 RX ORDER — POTASSIUM CHLORIDE 1500 MG/1
40 TABLET, EXTENDED RELEASE ORAL PRN
Status: DISCONTINUED | OUTPATIENT
Start: 2025-01-24 | End: 2025-02-02 | Stop reason: HOSPADM

## 2025-01-24 RX ORDER — ACETAMINOPHEN 325 MG/1
650 TABLET ORAL EVERY 6 HOURS PRN
Status: DISCONTINUED | OUTPATIENT
Start: 2025-01-24 | End: 2025-02-02 | Stop reason: HOSPADM

## 2025-01-24 RX ORDER — ONDANSETRON 2 MG/ML
4 INJECTION INTRAMUSCULAR; INTRAVENOUS EVERY 6 HOURS PRN
Status: DISCONTINUED | OUTPATIENT
Start: 2025-01-24 | End: 2025-02-02 | Stop reason: HOSPADM

## 2025-01-24 RX ORDER — POTASSIUM CHLORIDE 7.45 MG/ML
10 INJECTION INTRAVENOUS PRN
Status: DISCONTINUED | OUTPATIENT
Start: 2025-01-24 | End: 2025-02-02 | Stop reason: HOSPADM

## 2025-01-24 RX ORDER — TRIAMTERENE AND HYDROCHLOROTHIAZIDE 37.5; 25 MG/1; MG/1
1 TABLET ORAL DAILY
Status: DISCONTINUED | OUTPATIENT
Start: 2025-01-24 | End: 2025-02-02 | Stop reason: HOSPADM

## 2025-01-24 RX ORDER — SODIUM CHLORIDE 0.9 % (FLUSH) 0.9 %
5-40 SYRINGE (ML) INJECTION PRN
Status: DISCONTINUED | OUTPATIENT
Start: 2025-01-24 | End: 2025-02-02 | Stop reason: HOSPADM

## 2025-01-24 RX ORDER — POLYETHYLENE GLYCOL 3350 17 G/17G
17 POWDER, FOR SOLUTION ORAL DAILY PRN
Status: DISCONTINUED | OUTPATIENT
Start: 2025-01-24 | End: 2025-02-02 | Stop reason: HOSPADM

## 2025-01-24 RX ORDER — TRAMADOL HYDROCHLORIDE 50 MG/1
25 TABLET ORAL EVERY 8 HOURS
Status: DISCONTINUED | OUTPATIENT
Start: 2025-01-24 | End: 2025-02-02 | Stop reason: HOSPADM

## 2025-01-24 RX ORDER — SODIUM CHLORIDE 0.9 % (FLUSH) 0.9 %
5-40 SYRINGE (ML) INJECTION EVERY 12 HOURS SCHEDULED
Status: DISCONTINUED | OUTPATIENT
Start: 2025-01-24 | End: 2025-02-02 | Stop reason: HOSPADM

## 2025-01-24 RX ORDER — ONDANSETRON 4 MG/1
4 TABLET, ORALLY DISINTEGRATING ORAL EVERY 8 HOURS PRN
Status: DISCONTINUED | OUTPATIENT
Start: 2025-01-24 | End: 2025-02-02 | Stop reason: HOSPADM

## 2025-01-24 RX ADMIN — TRAMADOL HYDROCHLORIDE 25 MG: 50 TABLET, COATED ORAL at 22:04

## 2025-01-24 ASSESSMENT — ENCOUNTER SYMPTOMS
ABDOMINAL DISTENTION: 0
ABDOMINAL PAIN: 0
RHINORRHEA: 0
COLOR CHANGE: 0
SORE THROAT: 0
EYE DISCHARGE: 0
CONSTIPATION: 0
SHORTNESS OF BREATH: 0

## 2025-01-24 ASSESSMENT — PAIN - FUNCTIONAL ASSESSMENT: PAIN_FUNCTIONAL_ASSESSMENT: NONE - DENIES PAIN

## 2025-01-24 ASSESSMENT — LIFESTYLE VARIABLES
HOW MANY STANDARD DRINKS CONTAINING ALCOHOL DO YOU HAVE ON A TYPICAL DAY: PATIENT DOES NOT DRINK
HOW OFTEN DO YOU HAVE A DRINK CONTAINING ALCOHOL: NEVER

## 2025-01-24 ASSESSMENT — PAIN DESCRIPTION - ORIENTATION
ORIENTATION: LEFT
ORIENTATION: LOWER

## 2025-01-24 ASSESSMENT — PAIN DESCRIPTION - LOCATION
LOCATION: BACK
LOCATION: WRIST;HAND

## 2025-01-24 ASSESSMENT — PAIN DESCRIPTION - DESCRIPTORS: DESCRIPTORS: ACHING;HEAVINESS

## 2025-01-24 ASSESSMENT — PAIN SCALES - GENERAL
PAINLEVEL_OUTOF10: 8
PAINLEVEL_OUTOF10: 9

## 2025-01-24 NOTE — ED NOTES
Neighbor Art leaving, states to call him if anything changes. Phone number listed under . Art states he is her legal POA and will bring the paperwork with him when he comes back.
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
## 2025-01-24 NOTE — ED NOTES
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  Pt states that she has not been eating much \"lately\", unknown if this is accurate or not   Pt lives alone and has POA that is neighbor and helps pt  Pt states that she uses cane for mobility at home, \"I've never used a walker before\"

## 2025-01-24 NOTE — ED NOTES
Pt got up to bedside commode. Pt has unsteady gait, standby assist for transfer to bedside commode. Pt has confusion, is A&O x4, but is talking about having to go outside last night for a class.

## 2025-01-24 NOTE — H&P
capsule Take 1 capsule by mouth daily    Marquise Gregory MD   Cholecalciferol (VITAMIN D3) 50 MCG (2000 UT) CAPS Take by mouth daily    Marquise Gregory MD   Omega-3 Fatty Acids (FISH OIL) 1200 MG CAPS Take by mouth daily    Marquise Gregory MD   traMADol (ULTRAM) 50 MG tablet Take 25 mg by mouth every 8 hours.  6/10/20   Marquise Gregory MD   Probiotic Product ([employer] [facility] PO) Take 1 capsule by mouth every evening     Marquise Gregory MD   Loratadine (CLARITIN PO) Take 1 tablet by mouth daily     Marquise Gregory MD   triamterene-hydrochlorothiazide (MAXZIDE-25) 37.5-25 MG per tablet Take 1 tablet by mouth daily.      Marquise Gregory MD        Allergies   Bacitracin, Seasonal, and Statins    Social History     Social History       Tobacco History       Smoking Status  Every Day Smoking Start Date  2/24/1958 Current Packs/Day  0.3 packs/day Average Packs/Day  0.3 packs/day for 66.9 years (16.7 ttl pk-yrs) Smoking Tobacco Type  Cigarettes since 2/24/1958   Pack Year History     Packs/Day From To Years    0.25 2/24/1958  66.9      Smokeless Tobacco Use  Never      Tobacco Comments  1/2-1 pack per week--past 1 ppd              Alcohol History       Alcohol Use Status  No              Drug Use       Drug Use Status  Never              Sexual Activity       Sexually Active  Not Asked                    Family History     Family History   Problem Relation Age of Onset    Heart Disease Mother     Dementia Mother     Colon Polyps Mother     Diabetes Father     Obesity Father     Colon Cancer Neg Hx        Review of Systems   12 point review of systems reviewed with patient; negative other than as mentioned    Physical Exam   BP (!) 134/57   Pulse 74   Temp 98.3 °F (36.8 °C) (Oral)   Resp 18   Ht 1.575 m (5' 2\")   Wt 70.4 kg (155 lb 1.6 oz)   LMP 06/30/1980   SpO2 92%   BMI 28.37 kg/m²       CONSTITUTIONAL:  Awake, alert, no apparent distress, and appears stated  age  EYES: pupils equal, round and reactive to light   NECK:  Supple   LUNGS:  No increased work of breathing, good air exchange, clear to auscultation bilaterally, no crackles or wheezing  CARDIOVASCULAR:  Normal apical impulse, regular rate and rhythm  ABDOMEN:  No scars, normal bowel sounds, soft, non-distended, non-tender  MUSCULOSKELETAL:  There is no redness, warmth, or swelling of the joints.  Full range of motion noted  NEUROLOGIC:  Awake, alert.  No focal deficits noted  SKIN:  No bruising or bleeding, normal skin color, texture, turgor, no redness, warmth, or swelling, no rashes, and no lesions    Labs      Recent Results (from the past 24 hour(s))   CBC with Auto Differential    Collection Time: 01/24/25  7:16 AM   Result Value Ref Range    WBC 5.5 4.8 - 10.8 K/uL    RBC 4.38 4.20 - 5.40 M/uL    Hemoglobin 13.4 12.0 - 16.0 g/dL    Hematocrit 41.5 37.0 - 47.0 %    MCV 94.7 79.4 - 94.8 fL    MCH 30.6 27.0 - 31.3 pg    MCHC 32.3 (L) 33.0 - 37.0 %    RDW 13.6 11.5 - 14.5 %    Platelets 189 130 - 400 K/uL    Neutrophils % 79.6 %    Lymphocytes % 9.1 %    Monocytes % 9.5 %    Eosinophils % 1.1 %    Basophils % 0.2 %    Neutrophils Absolute 4.4 1.4 - 6.5 K/uL    Lymphocytes Absolute 0.5 (L) 1.0 - 4.8 K/uL    Monocytes Absolute 0.5 0.2 - 0.8 K/uL    Eosinophils Absolute 0.1 0.0 - 0.7 K/uL    Basophils Absolute 0.0 0.0 - 0.2 K/uL   CMP    Collection Time: 01/24/25  7:16 AM   Result Value Ref Range    Sodium 142 135 - 144 mEq/L    Potassium 3.4 3.4 - 4.9 mEq/L    Chloride 104 95 - 107 mEq/L    CO2 27 20 - 31 mEq/L    Anion Gap 11 9 - 15 mEq/L    Glucose 99 70 - 99 mg/dL    BUN 23 8 - 23 mg/dL    Creatinine 0.63 0.50 - 0.90 mg/dL    Est, Glom Filt Rate 85.4 >60    Calcium 9.0 8.5 - 9.9 mg/dL    Total Protein 7.1 6.3 - 8.0 g/dL    Albumin 4.1 3.5 - 4.6 g/dL    Total Bilirubin 0.7 0.2 - 0.7 mg/dL    Alkaline Phosphatase 85 40 - 130 U/L    ALT 17 0 - 33 U/L    AST 26 0 - 35 U/L    Globulin 3.0 2.3 - 3.5 g/dL  there is no intracranial hemorrhage. 2. Atrophy and periventricular microvascular ischemic disease,       Assessment      Hospital Problems             Last Modified POA    * (Principal) AMS (altered mental status) 1/24/2025 Yes       Plan   *Altered mental status  -CT of the head negative for acute abnormalities  -X-ray of left wrist negative  -Labs    -Consult neurology  -Telemetry  -BMP CBC in a.m.  -Adult regular diet  -PT OT eval and treat  -DVT prophylaxis Lovenox    *Hypertension, hypercholesterolemia  -Home medication includes Maxide, omega-3 fatty acid     *SBO; resolved no complaints of abdominal pain  -Home medication includes GlycoLax daily    Home medications reviewed; will resume as appropriate once reconciled by nurse    Additional work up or/and treatment plan may be added today or then after based on clinical progression by other providers or specialists.  Patient will need to follow-up with PCP for chronic disease management.     I have spent greater than 70% of time  spent focused exclusively on this patient ,reviewing  chart, labs/diagnostics, reconciling medications, &  answering questions with patient and discussing plan.  Consultations Ordered:  IP CONSULT TO NEUROLOGY    Electronically signed by SUSY Tsai CNP on 1/24/25 at 12:44 PM EST
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
## 2025-01-25 LAB
ANION GAP SERPL CALCULATED.3IONS-SCNC: 8 MEQ/L (ref 9–15)
BASOPHILS # BLD: 0 K/UL (ref 0–0.2)
BASOPHILS NFR BLD: 0.3 %
BUN SERPL-MCNC: 21 MG/DL (ref 8–23)
CALCIUM SERPL-MCNC: 8.7 MG/DL (ref 8.5–9.9)
CHLORIDE SERPL-SCNC: 107 MEQ/L (ref 95–107)
CO2 SERPL-SCNC: 30 MEQ/L (ref 20–31)
CREAT SERPL-MCNC: 0.67 MG/DL (ref 0.5–0.9)
EOSINOPHIL # BLD: 0.1 K/UL (ref 0–0.7)
EOSINOPHIL NFR BLD: 0.8 %
ERYTHROCYTE [DISTWIDTH] IN BLOOD BY AUTOMATED COUNT: 13.6 % (ref 11.5–14.5)
GLUCOSE SERPL-MCNC: 96 MG/DL (ref 70–99)
HCT VFR BLD AUTO: 41.4 % (ref 37–47)
HGB BLD-MCNC: 13.4 G/DL (ref 12–16)
LYMPHOCYTES # BLD: 0.6 K/UL (ref 1–4.8)
LYMPHOCYTES NFR BLD: 10 %
MCH RBC QN AUTO: 30.7 PG (ref 27–31.3)
MCHC RBC AUTO-ENTMCNC: 32.4 % (ref 33–37)
MCV RBC AUTO: 95 FL (ref 79.4–94.8)
MONOCYTES # BLD: 0.6 K/UL (ref 0.2–0.8)
MONOCYTES NFR BLD: 9.8 %
NEUTROPHILS # BLD: 5 K/UL (ref 1.4–6.5)
NEUTS SEG NFR BLD: 78.8 %
PLATELET # BLD AUTO: 172 K/UL (ref 130–400)
POTASSIUM SERPL-SCNC: 3.7 MEQ/L (ref 3.4–4.9)
RBC # BLD AUTO: 4.36 M/UL (ref 4.2–5.4)
SODIUM SERPL-SCNC: 145 MEQ/L (ref 135–144)
TSH REFLEX: 4.2 UIU/ML (ref 0.44–3.86)
WBC # BLD AUTO: 6.4 K/UL (ref 4.8–10.8)

## 2025-01-25 PROCEDURE — G0378 HOSPITAL OBSERVATION PER HR: HCPCS

## 2025-01-25 PROCEDURE — 97166 OT EVAL MOD COMPLEX 45 MIN: CPT

## 2025-01-25 PROCEDURE — 80048 BASIC METABOLIC PNL TOTAL CA: CPT

## 2025-01-25 PROCEDURE — 36415 COLL VENOUS BLD VENIPUNCTURE: CPT

## 2025-01-25 PROCEDURE — 6360000002 HC RX W HCPCS: Performed by: STUDENT IN AN ORGANIZED HEALTH CARE EDUCATION/TRAINING PROGRAM

## 2025-01-25 PROCEDURE — 2500000003 HC RX 250 WO HCPCS: Performed by: STUDENT IN AN ORGANIZED HEALTH CARE EDUCATION/TRAINING PROGRAM

## 2025-01-25 PROCEDURE — 82607 VITAMIN B-12: CPT

## 2025-01-25 PROCEDURE — 85025 COMPLETE CBC W/AUTO DIFF WBC: CPT

## 2025-01-25 PROCEDURE — 82746 ASSAY OF FOLIC ACID SERUM: CPT

## 2025-01-25 PROCEDURE — 97162 PT EVAL MOD COMPLEX 30 MIN: CPT

## 2025-01-25 PROCEDURE — 6370000000 HC RX 637 (ALT 250 FOR IP): Performed by: PSYCHIATRY & NEUROLOGY

## 2025-01-25 PROCEDURE — 6370000000 HC RX 637 (ALT 250 FOR IP): Performed by: INTERNAL MEDICINE

## 2025-01-25 PROCEDURE — 84443 ASSAY THYROID STIM HORMONE: CPT

## 2025-01-25 PROCEDURE — 99223 1ST HOSP IP/OBS HIGH 75: CPT | Performed by: PSYCHIATRY & NEUROLOGY

## 2025-01-25 PROCEDURE — 96372 THER/PROPH/DIAG INJ SC/IM: CPT

## 2025-01-25 RX ORDER — DIVALPROEX SODIUM 250 MG/1
250 TABLET, DELAYED RELEASE ORAL EVERY 8 HOURS SCHEDULED
Status: DISCONTINUED | OUTPATIENT
Start: 2025-01-25 | End: 2025-01-26

## 2025-01-25 RX ADMIN — DIVALPROEX SODIUM 250 MG: 250 TABLET, DELAYED RELEASE ORAL at 14:54

## 2025-01-25 RX ADMIN — DIVALPROEX SODIUM 250 MG: 250 TABLET, DELAYED RELEASE ORAL at 21:14

## 2025-01-25 RX ADMIN — TRAMADOL HYDROCHLORIDE 25 MG: 50 TABLET, COATED ORAL at 12:55

## 2025-01-25 RX ADMIN — Medication 10 ML: at 10:49

## 2025-01-25 RX ADMIN — ENOXAPARIN SODIUM 40 MG: 100 INJECTION SUBCUTANEOUS at 10:49

## 2025-01-25 RX ADMIN — TRIAMTERENE AND HYDROCHLOROTHIAZIDE 1 TABLET: 37.5; 25 TABLET ORAL at 10:49

## 2025-01-25 RX ADMIN — TRAMADOL HYDROCHLORIDE 25 MG: 50 TABLET, COATED ORAL at 21:14

## 2025-01-25 ASSESSMENT — ENCOUNTER SYMPTOMS
TROUBLE SWALLOWING: 0
CHOKING: 0
NAUSEA: 0
COLOR CHANGE: 0
PHOTOPHOBIA: 0
SHORTNESS OF BREATH: 0
BACK PAIN: 0
VOMITING: 0

## 2025-01-25 ASSESSMENT — PAIN SCALES - GENERAL
PAINLEVEL_OUTOF10: 0
PAINLEVEL_OUTOF10: 3
PAINLEVEL_OUTOF10: 0

## 2025-01-25 ASSESSMENT — PAIN DESCRIPTION - LOCATION: LOCATION: BACK

## 2025-01-25 ASSESSMENT — PAIN DESCRIPTION - ORIENTATION: ORIENTATION: LOWER

## 2025-01-25 ASSESSMENT — PAIN DESCRIPTION - DESCRIPTORS: DESCRIPTORS: ACHING;SORE

## 2025-01-25 NOTE — CONSULTS
1986--oral chemo -- OR    Cecal cancer (HCC)     dx 2002--chemo    Diverticulitis     HTN (hypertension)     meds > 20 yrs    Hypercholesterolemia     past trx with meds -- off meds > 5 yrs    Seasonal allergies     Small bowel obstruction (HCC)     Tinnitus 03/02/2022     Past Surgical History:   Procedure Laterality Date    APPENDECTOMY      age 32s    BREAST SURGERY Right 1987    mastectomy    CATARACT REMOVAL      CHOLECYSTECTOMY  1970    COLONOSCOPY  4/09 & 1/11    COLONOSCOPY  1/9/13,1/10/11          COLONOSCOPY  1/21/15    polypectomy     COLONOSCOPY N/A 5/17/2021    COLORECTAL CANCER SCREENING, HIGH RISK performed by Kadeem Dunn MD at [facility]    CYSTOSCOPY N/A 7/8/2020    CYSTOSCOPY TURBT RESECTOSCOPE performed by Davian Gama MD at [facility] [place]    CYSTOSCOPY N/A 1/12/2022    CYSTOSCOPY BILATERAL PYELOGRAM performed by Davian Gama MD at [facility] OR    CYSTOSCOPY N/A 1/12/2022    BIOPSY OF BLADDER performed by Davian Gama MD at [facility] OR    CYSTOSCOPY N/A 3/2/2022    CYSTOSCOPY performed by Davian Gama MD at [facility]    CYSTOSCOPY W BIOPSY OF BLADDER N/A 9/9/2020    CYSTOSCOPY BLADDER BIOPSY FULGURATION performed by Davian Gama MD at [facility] OR    ENDOSCOPY, COLON, DIAGNOSTIC      EYE SURGERY      Phaco with IOL OU    EYE SURGERY      Lasik OU    HERNIA REPAIR Right 12/11    ING. HERNIA REPAIN W/ MESH    MASTECTOMY Right 1987    MASTECTOMY, MODIFIED RADICAL Right 1987    OTHER SURGICAL HISTORY      EXC. BACK SKIN CYST    OTHER SURGICAL HISTORY  12/15/2009    PORT REMOVAL    NE COLONOSCOPY FLX DX W/COLLJ SPEC WHEN PFRMD N/A 3/30/2018    COLONOSCOPY performed by Misael Antunez MD at [facility]    NE ESOPHAGOGASTRODUODENOSCOPY TRANSORAL DIAGNOSTIC N/A 3/30/2018    EGD ESOPHAGOGASTRODUODENOSCOPY performed by Misael Antunez MD at [facility]    RIGHT COLECTOMY  04/28/09    CECAL CANCER    TUNNELED VENOUS PORT PLACEMENT  06/11/09    REMOVAL  12/09     Social History     Socioeconomic History    Marital status:      Spouse name: Not on file    Number of children: Not on file    Years of education: Not on file    Highest education level: Not on file   Occupational History    Not on file   Tobacco Use    Smoking status: Every Day     Current packs/day: 0.25     Average packs/day: 0.3 packs/day for 66.9 years (16.7 ttl pk-yrs)     Types: Cigarettes     Start date: 2/24/1958    Smokeless tobacco: Never    Tobacco comments:     1/2-1 pack per week--past 1 ppd   Vaping Use    Vaping status: Never Used   Substance and Sexual Activity    Alcohol use: No    Drug use: Never    Sexual activity: Not on file   Other Topics Concern    Not on file   Social History Narrative    Not on file     Social Determinants of Health     Financial Resource Strain: Not on file   Food Insecurity: No Food Insecurity (1/24/2025)    Hunger Vital Sign     Worried About Running Out of Food in the Last Year: Never true     Ran Out of Food in the Last Year: Never true   Transportation Needs: No Transportation Needs (1/24/2025)    PRAPARE - Transportation     Lack of Transportation (Medical): No     Lack of Transportation (Non-Medical): No   Physical Activity: Not on file   Stress: Not on file   Social Connections: Not on file   Intimate Partner Violence: Not on file   Housing Stability: Low Risk  (1/24/2025)    Housing Stability Vital Sign     Unable to Pay for Housing in the Last Year: No     Number of Times Moved in the Last Year: 0     Homeless in the Last Year: No     Family History   Problem Relation Age of Onset    Heart Disease Mother     Dementia Mother     Colon Polyps Mother     Diabetes Father     Obesity Father     Colon Cancer Neg Hx      Allergies   Allergen Reactions    Bacitracin Swelling    Seasonal      Sinus sx    Statins      Abdominal pain     Current Facility-Administered Medications   Medication Dose Route Frequency Provider Last Rate Last Admin    sodium

## 2025-01-25 NOTE — ACP (ADVANCE CARE PLANNING)
Advance Care Planning   Healthcare Decision Maker:    Primary Decision Maker: Art Albert - Other - [phone]    Secondary Decision Maker: Deandra Albert - Other - [phone]    Click here to complete Healthcare Decision Makers including selection of the Healthcare Decision Maker Relationship (ie \"Primary\").  Today we documented Decision Maker(s) consistent with ACP documents on file.       Electronically signed by Rissa Molina RN, BSN on 1/24/2025 at 8:20 PM

## 2025-01-25 NOTE — CARE COORDINATION
Case Management Assessment  Initial Evaluation    Date/Time of Evaluation: 1/24/2025 8:20 PM  Assessment Completed by: Rissa Molina, RN, BSN    If patient is discharged prior to next notation, then this note serves as note for discharge by case management.    Patient Name: Sherrie Lundberg                   YOB: 1935  Diagnosis: Hallucinations [R44.3]  Altered mental status, unspecified altered mental status type [R41.82]  AMS (altered mental status) [R41.82]                   Date / Time: 1/24/2025  6:41 AM    Patient Admission Status: Observation   Readmission Risk (Low < 19, Mod (19-27), High > 27): No data recorded  Current PCP: Alysha Castillo MD  PCP verified by ? Yes    Chart Reviewed: Yes      History Provided by: Patient, Friend, Medical Record, Other (see comment) (NEIGHBOR, FRIEND WHO ALSO IS KELSEY BARNEY PROVIDED INFORMATION AS WELL)  Patient Orientation: Alert and Oriented, Person, Place, Situation, Self (INTERMITTENT CONFUSION)    Patient Cognition: Other (see comment) (INTERMITTENT CONFUSION SURROUNDING EVENTS LEADING UP TO ER)    Hospitalization in the last 30 days (Readmission):  No    If yes, Readmission Assessment in CM Navigator will be completed.    Advance Directives:      Code Status: Full Code   Patient's Primary Decision Maker is: Named in Scanned ACP Document    Primary Decision Maker: Art Albert - Alysha - [phone]    Secondary Decision Maker: Deandra Albert - Alysha - [phone]    Discharge Planning:    Patient lives with: Alone Type of Home: House  Primary Care Giver: Self  Patient Support Systems include: Family Members, Friends/Neighbors   Current Financial resources: Medicare  Current community resources: None  Current services prior to admission: None            Current DME: Cane            Type of Home Care services:  None    ADLS  Prior functional level: Assistance with the following:, Mobility, Other (see comment) (AMB W CANE)  Current functional level:  Other (see comment) (DID NOT ASSESS FOUND DOWN OUTSIDE BY NEIGHBOR)    PT AM-PAC:   /24  OT AM-PAC:   /24    Family can provide assistance at DC: Other (comment) (ONLY HAS NEIGHBOR HIS MOM & [employer] FOR SOCIAL SUPPORT)  Would you like Case Management to discuss the discharge plan with any other family members/significant others, and if so, who? Yes (ECTOR)  Plans to Return to Present Housing: Unknown at present  Other Identified Issues/Barriers to RETURNING to current housing: DETERMINING THE CAUSE & ELIMINATING IT & RETURN TO NORMAL ACTIVITIES  Potential Assistance needed at discharge: Transportation, Meals On Wheels, Home Care            Potential DME:  TBD  Patient expects to discharge to: House  Plan for transportation at discharge:  TBD    Financial    Payor: [employer] MEDICARE / Plan: [employer] MEDICARE-ADVANTAGE PPO / Product Type: Medicare /     Does insurance require precert for SNF: Yes    Potential assistance Purchasing Medications: No  Meds-to-Beds request:  TBD      [facility] - [place], [place] - [address] - P [phone] - F [phone]  [address]  [facility] [address]  Phone: [phone] Fax: [phone]    [facility] [address] -  [phone] - F [phone]  [address] [place] [address]  Phone: [phone] Fax: [phone]      Notes:    Factors facilitating achievement of predicted outcomes: Friend support, Cooperative, and INTERMITTENT CONFUSION    Barriers to discharge: Confusion, Cognitive deficit, Limited safety awareness, Limited insight into deficits, Medical complications, Medication managment, and NEW PERIODS OF INTERMITTENT CONFUSION, NEURO WORK UP POSSIBLE PSYCH CONSULT    Additional Case Management Notes: FROM HOME ALONE. , NO CHILDREN. NEIGHBOR ECTOR VERY INVOLVED & IS POA ALONG WITH HIS MOM. PT VERY ACTIVE PRIOR TO EPISODE, DRIVES, COOKS, FREQUENTS NEIGHBORS' HOME. DID DRIVE A \"5 SPEED UP UNTIL

## 2025-01-25 NOTE — PROGRESS NOTES
See OT evaluation for all goals and OT POC. Electronically signed by Trevor Santa OTR/L on 1/25/2025 at 10:46 AM

## 2025-01-25 NOTE — PLAN OF CARE
Problem: Discharge Planning  Goal: Discharge to home or other facility with appropriate resources  Outcome: Progressing     Problem: Pain  Goal: Verbalizes/displays adequate comfort level or baseline comfort level  1/24/2025 2249 by Ella Draper RN  Outcome: Progressing  1/24/2025 1854 by Marah Cox RN  Outcome: Progressing     Problem: Safety - Adult  Goal: Free from fall injury  1/24/2025 2249 by Ella Draper RN  Outcome: Progressing  1/24/2025 1854 by Marah Cox RN  Outcome: Progressing     Problem: ABCDS Injury Assessment  Goal: Absence of physical injury  Outcome: Progressing

## 2025-01-25 NOTE — PLAN OF CARE
Therapy evaluation completed.  Please see daily notes and/or progress notes for details related to planned treatment interventions, goals and functional performance.     Electronically signed by Lisa Worrell PT on 1/25/2025 at 1:25 PM

## 2025-01-25 NOTE — CARE COORDINATION
CM SPOKE TO KELSEY BARNEY AND PT IN THE ROOM.  DISCUSSION WAS HAD ABOUT SAFETY AND REHAB.  PT IS AGREEABLE TO [facility].  REFERRAL SENT.  WILL NEED PRECERT.     CALL TO SNF [facility] ABOUT REFERRAL.  NAVJOT.

## 2025-01-25 NOTE — PROGRESS NOTES
[facility] OCCUPATIONAL THERAPY EVALUATION - ACUTE     NAME: Sherrie Lundberg  : 1935 (89 y.o.)  MRN: [id number]  CODE STATUS: Full Code  Room: [facility]/W287-01    Date of Service: 2025    Patient Diagnosis(es): Hallucinations [R44.3]  Altered mental status, unspecified altered mental status type [R41.82]  AMS (altered mental status) [R41.82]   Patient Active Problem List    Diagnosis Date Noted    AMS (altered mental status) 2025    Adenomatous polyp of sigmoid colon     Adenomatous polyp of descending colon     SBO (small bowel obstruction) (HCC) 10/26/2020    History of bladder cancer     Bladder cancer (HCC) 2020    Malignant neoplasm of posterior wall of urinary bladder (HCC)     Bladder tumor 2020    Tobacco use disorder 2020    Blood in stool     Gastric ulcer without hemorrhage, perforation, or obstruction     Personal history of colon cancer     Diverticulosis of large intestine without diverticulitis     Vitamin D deficiency 2015    Seasonal allergies     Cancer (HCC)     Cecal cancer (HCC)         Past Medical History:   Diagnosis Date    Allergic rhinitis     Arthritis     Breast cancer (HCC)     RT BREAST--dx --oral chemo -- OR    Cecal cancer (HCC)     dx --chemo    Diverticulitis     HTN (hypertension)     meds > 20 yrs    Hypercholesterolemia     past trx with meds -- off meds > 5 yrs    Seasonal allergies     Small bowel obstruction (HCC)     Tinnitus 2022     Past Surgical History:   Procedure Laterality Date    APPENDECTOMY      age 30s    BREAST SURGERY Right 1987    mastectomy    CATARACT REMOVAL      CHOLECYSTECTOMY  1970    COLONOSCOPY   &     COLONOSCOPY  13,1/10/11          COLONOSCOPY  1/21/15    polypectomy     COLONOSCOPY N/A 2021    COLORECTAL CANCER SCREENING, HIGH RISK performed by Kadeem Dunn MD at [facility]    CYSTOSCOPY N/A 2020    CYSTOSCOPY TURBT RESECTOSCOPE performed by

## 2025-01-26 VITALS
HEIGHT: 62 IN | RESPIRATION RATE: 18 BRPM | WEIGHT: 155.1 LBS | BODY MASS INDEX: 28.54 KG/M2 | OXYGEN SATURATION: 94 % | SYSTOLIC BLOOD PRESSURE: 131 MMHG | DIASTOLIC BLOOD PRESSURE: 51 MMHG | TEMPERATURE: 97.9 F | HEART RATE: 66 BPM

## 2025-01-26 LAB
FOLATE: 36.7 NG/ML (ref 4.8–24.2)
VITAMIN B-12: 771 PG/ML (ref 232–1245)

## 2025-01-26 PROCEDURE — 6370000000 HC RX 637 (ALT 250 FOR IP): Performed by: INTERNAL MEDICINE

## 2025-01-26 PROCEDURE — 99232 SBSQ HOSP IP/OBS MODERATE 35: CPT | Performed by: PSYCHIATRY & NEUROLOGY

## 2025-01-26 PROCEDURE — G0378 HOSPITAL OBSERVATION PER HR: HCPCS

## 2025-01-26 PROCEDURE — 6370000000 HC RX 637 (ALT 250 FOR IP): Performed by: PSYCHIATRY & NEUROLOGY

## 2025-01-26 PROCEDURE — 6360000002 HC RX W HCPCS: Performed by: STUDENT IN AN ORGANIZED HEALTH CARE EDUCATION/TRAINING PROGRAM

## 2025-01-26 PROCEDURE — 2500000003 HC RX 250 WO HCPCS: Performed by: STUDENT IN AN ORGANIZED HEALTH CARE EDUCATION/TRAINING PROGRAM

## 2025-01-26 PROCEDURE — 96372 THER/PROPH/DIAG INJ SC/IM: CPT

## 2025-01-26 RX ORDER — MEMANTINE HYDROCHLORIDE 10 MG/1
5 TABLET ORAL 2 TIMES DAILY
Status: DISCONTINUED | OUTPATIENT
Start: 2025-01-26 | End: 2025-01-26

## 2025-01-26 RX ORDER — MEMANTINE HYDROCHLORIDE 10 MG/1
5 TABLET ORAL 2 TIMES DAILY
Status: DISCONTINUED | OUTPATIENT
Start: 2025-01-26 | End: 2025-02-02 | Stop reason: HOSPADM

## 2025-01-26 RX ADMIN — TRAMADOL HYDROCHLORIDE 25 MG: 50 TABLET, COATED ORAL at 20:07

## 2025-01-26 RX ADMIN — TRIAMTERENE AND HYDROCHLOROTHIAZIDE 1 TABLET: 37.5; 25 TABLET ORAL at 09:15

## 2025-01-26 RX ADMIN — ENOXAPARIN SODIUM 40 MG: 100 INJECTION SUBCUTANEOUS at 09:15

## 2025-01-26 RX ADMIN — MEMANTINE 5 MG: 10 TABLET ORAL at 20:07

## 2025-01-26 RX ADMIN — Medication 10 ML: at 20:08

## 2025-01-26 RX ADMIN — Medication 10 ML: at 08:09

## 2025-01-26 RX ADMIN — DIVALPROEX SODIUM 250 MG: 250 TABLET, DELAYED RELEASE ORAL at 09:16

## 2025-01-26 RX ADMIN — MEMANTINE 5 MG: 10 TABLET ORAL at 13:50

## 2025-01-26 RX ADMIN — TRAMADOL HYDROCHLORIDE 25 MG: 50 TABLET, COATED ORAL at 12:17

## 2025-01-26 ASSESSMENT — PAIN SCALES - GENERAL
PAINLEVEL_OUTOF10: 5
PAINLEVEL_OUTOF10: 8
PAINLEVEL_OUTOF10: 0

## 2025-01-26 ASSESSMENT — PAIN DESCRIPTION - ORIENTATION: ORIENTATION: LEFT

## 2025-01-26 ASSESSMENT — PAIN DESCRIPTION - LOCATION: LOCATION: WRIST

## 2025-01-26 ASSESSMENT — PAIN DESCRIPTION - DESCRIPTORS: DESCRIPTORS: ACHING

## 2025-01-26 NOTE — PLAN OF CARE
Problem: Discharge Planning  Goal: Discharge to home or other facility with appropriate resources  Outcome: Progressing     Problem: Pain  Goal: Verbalizes/displays adequate comfort level or baseline comfort level  1/25/2025 2154 by Ella Draper RN  Outcome: Progressing  1/25/2025 1509 by Maci Nieto RN  Outcome: Progressing     Problem: Safety - Adult  Goal: Free from fall injury  1/25/2025 2154 by Ella Draper RN  Outcome: Progressing  1/25/2025 1509 by Maci Nieto RN  Outcome: Progressing     Problem: ABCDS Injury Assessment  Goal: Absence of physical injury  1/25/2025 2154 by Ella Draper RN  Outcome: Progressing  1/25/2025 1509 by Maci Nieto RN  Outcome: Progressing

## 2025-01-26 NOTE — PROGRESS NOTES
Call received from Art asking for update. CIT still in progress at this time, but updates given on pt to best of writer's ability. He stated he would be in to see her in the AM.
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
## 2025-01-26 NOTE — PROGRESS NOTES
Hospitalist Progress Note      PCP: Alysha Castillo MD    Date of Admission: 1/24/2025    Chief Complaint:    Chief Complaint   Patient presents with    Altered Mental Status     Subjective:  No acute events overnight. Pt sitting up in the chair eating lunch. Denies any acute complaints. Denies chest pain or shortness of breath.    Medications:  Reviewed    Infusion Medications    sodium chloride       Scheduled Medications    divalproex  250 mg Oral 3 times per day    sodium chloride flush  5-40 mL IntraVENous 2 times per day    enoxaparin  40 mg SubCUTAneous Daily    traMADol  25 mg Oral Q8H    triamterene-hydroCHLOROthiazide  1 tablet Oral Daily     PRN Meds: sodium chloride flush, sodium chloride, potassium chloride **OR** potassium alternative oral replacement **OR** potassium chloride, magnesium sulfate, ondansetron **OR** ondansetron, polyethylene glycol, acetaminophen **OR** acetaminophen      Intake/Output Summary (Last 24 hours) at 1/26/2025 0213  Last data filed at 1/25/2025 1821  Gross per 24 hour   Intake 365 ml   Output --   Net 365 ml     Exam:  /61   Pulse 78   Temp 97.7 °F (36.5 °C) (Oral)   Resp 18   Ht 1.575 m (5' 2\")   Wt 70.4 kg (155 lb 1.6 oz)   LMP 06/30/1980   SpO2 93%   BMI 28.37 kg/m²   Physical Exam  Cardiovascular:      Rate and Rhythm: Normal rate and regular rhythm.   Pulmonary:      Effort: Pulmonary effort is normal. No respiratory distress.   Abdominal:      Palpations: Abdomen is soft.      Tenderness: There is no abdominal tenderness.   Neurological:      Mental Status: She is alert and oriented to person, place, and time.   Psychiatric:         Mood and Affect: Mood normal.         Behavior: Behavior normal.       Labs:   Recent Labs     01/24/25  0716 01/25/25  0517   WBC 5.5 6.4   HGB 13.4 13.4   HCT 41.5 41.4    172     Recent Labs     01/24/25  0716 01/25/25  0517    145*   K 3.4 3.7    107   CO2 27 30   BUN 23 21   CREATININE 0.63 0.67

## 2025-01-26 NOTE — PLAN OF CARE
Problem: Discharge Planning  Goal: Discharge to home or other facility with appropriate resources  1/26/2025 0824 by Aishwarya Crowell, RN  Outcome: Progressing     Problem: Pain  Goal: Verbalizes/displays adequate comfort level or baseline comfort level  1/26/2025 0824 by Aishwarya Crowell, RN  Outcome: Progressing     Problem: Safety - Adult  Goal: Free from fall injury  1/26/2025 0824 by Aishwarya Crowell, RN  Outcome: Progressing     Problem: ABCDS Injury Assessment  Goal: Absence of physical injury  1/26/2025 0824 by Aishwarya Crowell, RN  Outcome: Progressing

## 2025-01-26 NOTE — PROGRESS NOTES
SYSTEM PROVIDED HISTORY: fall TECHNOLOGIST PROVIDED HISTORY: Reason for exam:->fall What reading provider will be dictating this exam?->CRC FINDINGS: Generalized bony demineralization consistent with the patient's age.  No fracture or acute osseous abnormality.  There is degenerative arthritis of the left hip and left knee.  No suspicious bony lesion.  Atherosclerotic calcifications are present.     1.  No fracture or acute osseous abnormality. 2.  Chronic changes, as above.     XR PELVIS (1-2 VIEWS)    Result Date: 1/24/2025  EXAMINATION: ONE XRAY VIEW OF THE PELVIS 1/24/2025 8:24 am COMPARISON: CT abdomen and pelvis 10/26/2020 HISTORY: ORDERING SYSTEM PROVIDED HISTORY: fall TECHNOLOGIST PROVIDED HISTORY: Reason for exam:->fall What reading provider will be dictating this exam?->CRC FINDINGS: Generalized bony demineralization consistent with the patient's age.  Poor visualization of the sacrum secondary to overlying bowel. No fracture or dislocation.  No suspicious bony lesion.  Bilateral hip osteoarthritis, mild to moderate on the left and mild on the right.  Normal pubic symphysis.  Poor visualization of bilateral SI joints.  Multilevel degenerative changes of the lower lumbar spine.     1.  No fracture or acute osseous abnormality. 2.  Degenerative changes, as above.     CT CERVICAL SPINE WO CONTRAST    Result Date: 1/24/2025  EXAMINATION: CT OF THE CERVICAL SPINE WITHOUT CONTRAST 1/24/2025 8:11 am TECHNIQUE: CT of the cervical spine was performed without the administration of intravenous contrast. Multiplanar reformatted images are provided for review. Automated exposure control, iterative reconstruction, and/or weight based adjustment of the mA/kV was utilized to reduce the radiation dose to as low as reasonably achievable. COMPARISON: None. HISTORY: ORDERING SYSTEM PROVIDED HISTORY: fall TECHNOLOGIST PROVIDED HISTORY: Reason for exam:->fall Decision Support Exception - unselect if not a suspected or confirmed  emergency medical condition->Emergency Medical Condition (MA) What reading provider will be dictating this exam?->CRC FINDINGS: The ring of C1 is intact as is the dense.  There is no compression fracture of the cervical spine.  No jumped or perched facet is noted. Multilevel degenerative disc and degenerative joint disease is noted. The prevertebral soft tissues are unremarkable.  The airway is widely patent. Images through the lung apices are negative for a pneumothorax.     1. There is no acute compression fracture or subluxation of the cervical spine. 2. Multilevel degenerative disc and degenerative joint disease.     CT Head W/O Contrast    Result Date: 1/24/2025  EXAMINATION: CT OF THE HEAD WITHOUT CONTRAST  1/24/2025 8:11 am TECHNIQUE: CT of the head was performed without the administration of intravenous contrast. Automated exposure control, iterative reconstruction, and/or weight based adjustment of the mA/kV was utilized to reduce the radiation dose to as low as reasonably achievable. COMPARISON: None. HISTORY: ORDERING SYSTEM PROVIDED HISTORY: fall, confusion TECHNOLOGIST PROVIDED HISTORY: Reason for exam:->fall, confusion Has a \"code stroke\" or \"stroke alert\" been called?->No Decision Support Exception - unselect if not a suspected or confirmed emergency medical condition->Emergency Medical Condition (MA) What reading provider will be dictating this exam?->CRC FINDINGS: BRAIN/VENTRICLES: There is no acute intracranial hemorrhage, mass effect or midline shift.  No abnormal extra-axial fluid collection.  The gray-white differentiation is maintained without evidence of an acute infarct.  There is no evidence of hydrocephalus. The ventricles, cisterns and sulci are prominent consistent with atrophy.  There is decreased attenuation within the periventricular white matter consistent with periventricular microvascular ischemic disease. ORBITS: The visualized portion of the orbits demonstrate no acute

## 2025-01-26 NOTE — FLOWSHEET NOTE
Morning assessment complete, VSS, pt alert to self/place only this AM. Pt very confused as to why she is here and would like to \"get out of here\". Ambulated pt to bathroom, returned to bed, currently sitting on EOB. This RN setup breakfast but pt currently refusing to eat in thoughts that this writer is trying to \"drug her\". Reassured pt that I am not here to drug her just setting up her breakfast. 1:1 remains at bedside for safety. Care ongoing.     1052 Ambulated pt to bathroom, pt had large BM, helped pt wash up for the day, new gown, complete linen change. Pt returned to chair. 1:1 remains at bedside for safety.

## 2025-01-27 PROBLEM — F22 DELUSIONS (HCC): Status: ACTIVE | Noted: 2025-01-27

## 2025-01-27 PROBLEM — R44.3 HALLUCINATION: Status: ACTIVE | Noted: 2025-01-27

## 2025-01-27 PROBLEM — G93.41 METABOLIC ENCEPHALOPATHY: Status: ACTIVE | Noted: 2025-01-27

## 2025-01-27 LAB
ANION GAP SERPL CALCULATED.3IONS-SCNC: 8 MEQ/L (ref 9–15)
BUN SERPL-MCNC: 33 MG/DL (ref 8–23)
CALCIUM SERPL-MCNC: 8.7 MG/DL (ref 8.5–9.9)
CHLORIDE SERPL-SCNC: 102 MEQ/L (ref 95–107)
CO2 SERPL-SCNC: 31 MEQ/L (ref 20–31)
CREAT SERPL-MCNC: 1.06 MG/DL (ref 0.5–0.9)
EKG ATRIAL RATE: 67 BPM
EKG P AXIS: 79 DEGREES
EKG P-R INTERVAL: 204 MS
EKG Q-T INTERVAL: 422 MS
EKG QRS DURATION: 80 MS
EKG QTC CALCULATION (BAZETT): 445 MS
EKG R AXIS: 48 DEGREES
EKG T AXIS: 51 DEGREES
EKG VENTRICULAR RATE: 67 BPM
GLUCOSE SERPL-MCNC: 85 MG/DL (ref 70–99)
MAGNESIUM SERPL-MCNC: 2.3 MG/DL (ref 1.7–2.4)
POTASSIUM SERPL-SCNC: 3.5 MEQ/L (ref 3.4–4.9)
SODIUM SERPL-SCNC: 141 MEQ/L (ref 135–144)

## 2025-01-27 PROCEDURE — 83735 ASSAY OF MAGNESIUM: CPT

## 2025-01-27 PROCEDURE — 97116 GAIT TRAINING THERAPY: CPT

## 2025-01-27 PROCEDURE — 93010 ELECTROCARDIOGRAM REPORT: CPT | Performed by: INTERNAL MEDICINE

## 2025-01-27 PROCEDURE — 6370000000 HC RX 637 (ALT 250 FOR IP): Performed by: PSYCHIATRY & NEUROLOGY

## 2025-01-27 PROCEDURE — 6370000000 HC RX 637 (ALT 250 FOR IP): Performed by: STUDENT IN AN ORGANIZED HEALTH CARE EDUCATION/TRAINING PROGRAM

## 2025-01-27 PROCEDURE — 96372 THER/PROPH/DIAG INJ SC/IM: CPT

## 2025-01-27 PROCEDURE — G0378 HOSPITAL OBSERVATION PER HR: HCPCS

## 2025-01-27 PROCEDURE — 80048 BASIC METABOLIC PNL TOTAL CA: CPT

## 2025-01-27 PROCEDURE — 95816 EEG AWAKE AND DROWSY: CPT

## 2025-01-27 PROCEDURE — 99232 SBSQ HOSP IP/OBS MODERATE 35: CPT | Performed by: PSYCHIATRY & NEUROLOGY

## 2025-01-27 PROCEDURE — 36415 COLL VENOUS BLD VENIPUNCTURE: CPT

## 2025-01-27 PROCEDURE — 6360000002 HC RX W HCPCS: Performed by: STUDENT IN AN ORGANIZED HEALTH CARE EDUCATION/TRAINING PROGRAM

## 2025-01-27 PROCEDURE — 6370000000 HC RX 637 (ALT 250 FOR IP): Performed by: INTERNAL MEDICINE

## 2025-01-27 PROCEDURE — 2500000003 HC RX 250 WO HCPCS: Performed by: STUDENT IN AN ORGANIZED HEALTH CARE EDUCATION/TRAINING PROGRAM

## 2025-01-27 PROCEDURE — APPSS30 APP SPLIT SHARED TIME 16-30 MINUTES: Performed by: NURSE PRACTITIONER

## 2025-01-27 RX ADMIN — TRAMADOL HYDROCHLORIDE 25 MG: 50 TABLET, COATED ORAL at 12:36

## 2025-01-27 RX ADMIN — TRAMADOL HYDROCHLORIDE 25 MG: 50 TABLET, COATED ORAL at 05:47

## 2025-01-27 RX ADMIN — TRIAMTERENE AND HYDROCHLOROTHIAZIDE 1 TABLET: 37.5; 25 TABLET ORAL at 10:31

## 2025-01-27 RX ADMIN — ENOXAPARIN SODIUM 40 MG: 100 INJECTION SUBCUTANEOUS at 10:31

## 2025-01-27 RX ADMIN — MEMANTINE 5 MG: 10 TABLET ORAL at 10:31

## 2025-01-27 RX ADMIN — TRAMADOL HYDROCHLORIDE 25 MG: 50 TABLET, COATED ORAL at 20:38

## 2025-01-27 RX ADMIN — POLYETHYLENE GLYCOL 3350 17 G: 17 POWDER, FOR SOLUTION ORAL at 12:37

## 2025-01-27 RX ADMIN — MEMANTINE 5 MG: 10 TABLET ORAL at 20:37

## 2025-01-27 RX ADMIN — Medication 10 ML: at 10:31

## 2025-01-27 RX ADMIN — Medication 10 ML: at 21:03

## 2025-01-27 ASSESSMENT — ENCOUNTER SYMPTOMS
COLOR CHANGE: 0
TROUBLE SWALLOWING: 0
NAUSEA: 0
SHORTNESS OF BREATH: 0
WHEEZING: 0
COUGH: 0
CHEST TIGHTNESS: 0
VOMITING: 0

## 2025-01-27 ASSESSMENT — PAIN DESCRIPTION - LOCATION: LOCATION: BACK

## 2025-01-27 ASSESSMENT — PAIN SCALES - GENERAL
PAINLEVEL_OUTOF10: 0
PAINLEVEL_OUTOF10: 3
PAINLEVEL_OUTOF10: 2

## 2025-01-27 NOTE — PROGRESS NOTES
Physical Therapy Med Surg Daily Treatment Note  Facility/Department: [address] ORTHO TELE  Room: [facility]       NAME: Sherrie Lundberg  : 1935 (89 y.o.)  MRN: [id number]  CODE STATUS: Full Code    Date of Service: 2025    Patient Diagnosis(es): Hallucinations [R44.3]  Altered mental status, unspecified altered mental status type [R41.82]  AMS (altered mental status) [R41.82]   Chief Complaint   Patient presents with    Altered Mental Status     Patient Active Problem List    Diagnosis Date Noted    AMS (altered mental status) 2025    Adenomatous polyp of sigmoid colon     Adenomatous polyp of descending colon     SBO (small bowel obstruction) (HCC) 10/26/2020    History of bladder cancer     Bladder cancer (HCC) 2020    Malignant neoplasm of posterior wall of urinary bladder (HCC)     Bladder tumor 2020    Tobacco use disorder 2020    Blood in stool     Gastric ulcer without hemorrhage, perforation, or obstruction     Personal history of colon cancer     Diverticulosis of large intestine without diverticulitis     Vitamin D deficiency 2015    Seasonal allergies     Cancer (HCC)     Cecal cancer (HCC)         Past Medical History:   Diagnosis Date    Allergic rhinitis     Arthritis     Breast cancer (HCC)     RT BREAST--dx --oral chemo -- OR    Cecal cancer (HCC)     dx --chemo    Diverticulitis     HTN (hypertension)     meds > 20 yrs    Hypercholesterolemia     past trx with meds -- off meds > 5 yrs    Seasonal allergies     Small bowel obstruction (HCC)     Tinnitus 2022     Past Surgical History:   Procedure Laterality Date    APPENDECTOMY      age 32s    BREAST SURGERY Right 1987    mastectomy    CATARACT REMOVAL      CHOLECYSTECTOMY  1970    COLONOSCOPY   &     COLONOSCOPY  13,1/10/11          COLONOSCOPY  1/21/15    polypectomy     COLONOSCOPY N/A 2021    COLORECTAL CANCER SCREENING, HIGH RISK performed by Kadeem TEJADA

## 2025-01-27 NOTE — CARE COORDINATION
PER EDUAR WITH [facility] THE PRECERT WAS STARTED TODAY FOR [facility].  BEDS ARE TIGHT AT [facility] SO PT MAY NEED TO GO TO [facility] IF NO BED IS AVAILABLE WHEN THE PRECERT APPROVAL IS RECEIVED.  PT MADE AWARE OF THIS.

## 2025-01-27 NOTE — PROGRESS NOTES
Comprehensive Nutrition Assessment    Type and Reason for Visit:  Initial, Positive nutrition screen    Nutrition Recommendations/Plan:   Modify Current Diet (LUIS ANTONIO)     Malnutrition Assessment:  Malnutrition Status:  No malnutrition (01/27/25 1523)      Nutrition Assessment:    Pt appears stable from a nutritional standpoint, with reported usual good appetite/intake currently and pta, with no nutritional complaints. To modify diet to LUIS ANTONIO d/t diuretic tx and edema noted. To continue to follow.    Nutrition Related Findings:    PMH-htn, hld, cecal CA, breast CA; admitted with 'Delusions and hallucinations which may suggest very early dementia'. Labs/meds reviewed. Pt receiving namenda, HCTZ. +1 BLE edema noted. Wound Type: None       Current Nutrition Intake & Therapies:    Average Meal Intake: %     ADULT DIET; Regular    Anthropometric Measures:  Height: 157.5 cm (5' 2\")  Ideal Body Weight (IBW): 110 lbs (50 kg)    Admission Body Weight: 70.4 kg (155 lb 3.3 oz) (actual)  Current BMI (kg/m2):  28.4  Usual Body Weight: 70.3 kg (155 lb) ((5/2024); 149lb (1/2023))                          BMI Categories: Overweight (BMI 25.0-29.9)  trition Diagnosis:   No nutrition diagnosis at this time     Nutrition Interventions:   Food and/or Nutrient Delivery: Modify Current Diet (LUIS ANTONIO)  Nutrition Education/Counseling: No recommendation at this time  Coordination of Nutrition Care: Continue to monitor while inpatient       Goals:  Goals: PO intake 75% or greater (improvement in edema)  Type of Goal: New goal       Nutrition Monitoring and Evaluation:      Food/Nutrient Intake Outcomes: Food and Nutrient Intake  Physical Signs/Symptoms Outcomes: Weight, Biochemical Data, Fluid Status or Edema    Discharge Planning:    No discharge needs at this time     Liat Kang, RD, LD

## 2025-01-27 NOTE — PROGRESS NOTES
Hospitalist Progress Note      PCP: Alysha Castillo MD    Date of Admission: 1/24/2025    Chief Complaint:    Chief Complaint   Patient presents with    Altered Mental Status     Subjective:  Pt with agitation and confusion overnight. Pt sitting up in the chair. Calm and cooperative at this time. Denies any acute complaints. Denies chest pain or shortness of breath.    Medications:  Reviewed    Infusion Medications    sodium chloride       Scheduled Medications    memantine  5 mg Oral BID    sodium chloride flush  5-40 mL IntraVENous 2 times per day    enoxaparin  40 mg SubCUTAneous Daily    traMADol  25 mg Oral Q8H    triamterene-hydroCHLOROthiazide  1 tablet Oral Daily     PRN Meds: sodium chloride flush, sodium chloride, potassium chloride **OR** potassium alternative oral replacement **OR** potassium chloride, magnesium sulfate, ondansetron **OR** ondansetron, polyethylene glycol, acetaminophen **OR** acetaminophen      Intake/Output Summary (Last 24 hours) at 1/26/2025 2055  Last data filed at 1/26/2025 1318  Gross per 24 hour   Intake 430 ml   Output --   Net 430 ml     Exam:  BP (!) 94/34   Pulse 66   Temp 97.9 °F (36.6 °C)   Resp 18   Ht 1.575 m (5' 2\")   Wt 70.4 kg (155 lb 1.6 oz)   LMP 06/30/1980   SpO2 94%   BMI 28.37 kg/m²   Physical Exam  Cardiovascular:      Rate and Rhythm: Normal rate and regular rhythm.   Pulmonary:      Effort: Pulmonary effort is normal. No respiratory distress.   Abdominal:      Palpations: Abdomen is soft.      Tenderness: There is no abdominal tenderness.   Neurological:      Mental Status: She is alert and oriented to person, place, and time.   Psychiatric:         Mood and Affect: Mood normal.         Behavior: Behavior normal.       Labs:   Recent Labs     01/24/25  0716 01/25/25  0517   WBC 5.5 6.4   HGB 13.4 13.4   HCT 41.5 41.4    172     Recent Labs     01/24/25  0716 01/25/25  0517    145*   K 3.4 3.7    107   CO2 27 30   BUN 23 21

## 2025-01-27 NOTE — PROGRESS NOTES
Physical Therapy Med Surg Initial Assessment  Facility/Department: [address] ORTHO TELE  Room: [facility]/[place] [address]       NAME: Sherrie Lundberg  : 1935 (89 y.o.)  MRN: [id number]  CODE STATUS: Full Code    Date of Service: 2025    Patient Diagnosis(es): Hallucinations [R44.3]  Altered mental status, unspecified altered mental status type [R41.82]  AMS (altered mental status) [R41.82]   Chief Complaint   Patient presents with    Altered Mental Status     Patient Active Problem List    Diagnosis Date Noted    AMS (altered mental status) 2025    Adenomatous polyp of sigmoid colon     Adenomatous polyp of descending colon     SBO (small bowel obstruction) (HCC) 10/26/2020    History of bladder cancer     Bladder cancer (HCC) 2020    Malignant neoplasm of posterior wall of urinary bladder (HCC)     Bladder tumor 2020    Tobacco use disorder 2020    Blood in stool     Gastric ulcer without hemorrhage, perforation, or obstruction     Personal history of colon cancer     Diverticulosis of large intestine without diverticulitis     Vitamin D deficiency 2015    Seasonal allergies     Cancer (HCC)     Cecal cancer (HCC)         Past Medical History:   Diagnosis Date    Allergic rhinitis     Arthritis     Breast cancer (HCC)     RT BREAST--dx --oral chemo -- OR    Cecal cancer (HCC)     dx --chemo    Diverticulitis     HTN (hypertension)     meds > 20 yrs    Hypercholesterolemia     past trx with meds -- off meds > 5 yrs    Seasonal allergies     Small bowel obstruction (HCC)     Tinnitus 2022     Past Surgical History:   Procedure Laterality Date    APPENDECTOMY      age 30s    BREAST SURGERY Right 1987    mastectomy    CATARACT REMOVAL      CHOLECYSTECTOMY  1970    COLONOSCOPY   &     COLONOSCOPY  13,1/10/11          COLONOSCOPY  1/21/15    polypectomy     COLONOSCOPY N/A 2021    COLORECTAL CANCER SCREENING, HIGH RISK performed by Kadeem TEJADA

## 2025-01-27 NOTE — PROGRESS NOTES
Neurology Follow up    SUBJECTIVE: Patient seen and examined for neurology follow-up.  Alert and oriented x 1-2, no acute distress, cooperative.  No current behavioral disturbances.  No seizure activity reported.  No headache.  Afebrile.  Patient actually feels much better today since we changed her medication  Current Facility-Administered Medications   Medication Dose Route Frequency Provider Last Rate Last Admin    memantine (NAMENDA) tablet 5 mg  5 mg Oral BID Morro Garcia MD   5 mg at 01/27/25 1031    sodium chloride flush 0.9 % injection 5-40 mL  5-40 mL IntraVENous 2 times per day Coleen Cotton MD   10 mL at 01/27/25 1031    sodium chloride flush 0.9 % injection 5-40 mL  5-40 mL IntraVENous PRN Coleen Cotton MD        0.9 % sodium chloride infusion   IntraVENous PRN Coleen Cotton MD        potassium chloride (KLOR-CON M) extended release tablet 40 mEq  40 mEq Oral PRN Coleen Cotton MD        Or    potassium bicarb-citric acid (EFFER-K) effervescent tablet 40 mEq  40 mEq Oral PRN Coleen Cotton MD        Or    potassium chloride 10 mEq/100 mL IVPB (Peripheral Line)  10 mEq IntraVENous PRN Coleen Cotton MD        magnesium sulfate 2000 mg in 50 mL IVPB premix  2,000 mg IntraVENous PRN Coleen Cotton MD        enoxaparin (LOVENOX) injection 40 mg  40 mg SubCUTAneous Daily Coleen Cotton MD   40 mg at 01/27/25 1031    ondansetron (ZOFRAN-ODT) disintegrating tablet 4 mg  4 mg Oral Q8H PRN Coleen Cotton MD        Or    ondansetron (ZOFRAN) injection 4 mg  4 mg IntraVENous Q6H PRN Coleen Cotton MD        polyethylene glycol (GLYCOLAX) packet 17 g  17 g Oral Daily PRN Coleen Cotton MD   17 g at 01/27/25 1237    acetaminophen (TYLENOL) tablet 650 mg  650 mg Oral Q6H PRN Coleen Cotton MD        Or    acetaminophen (TYLENOL) suppository 650 mg  650 mg Rectal Q6H PRN Coleen Cotton MD        traMADol (ULTRAM) tablet 25 mg  25 mg Oral Q8H Tevin De La Paz MD   25 mg at

## 2025-01-28 PROBLEM — R41.82 ALTERED MENTAL STATUS: Status: ACTIVE | Noted: 2025-01-28

## 2025-01-28 PROCEDURE — 99232 SBSQ HOSP IP/OBS MODERATE 35: CPT | Performed by: PSYCHIATRY & NEUROLOGY

## 2025-01-28 PROCEDURE — 97116 GAIT TRAINING THERAPY: CPT

## 2025-01-28 PROCEDURE — 96372 THER/PROPH/DIAG INJ SC/IM: CPT

## 2025-01-28 PROCEDURE — APPSS15 APP SPLIT SHARED TIME 0-15 MINUTES: Performed by: NURSE PRACTITIONER

## 2025-01-28 PROCEDURE — 97530 THERAPEUTIC ACTIVITIES: CPT

## 2025-01-28 PROCEDURE — 6370000000 HC RX 637 (ALT 250 FOR IP): Performed by: PSYCHIATRY & NEUROLOGY

## 2025-01-28 PROCEDURE — 1210000000 HC MED SURG R&B

## 2025-01-28 PROCEDURE — 6370000000 HC RX 637 (ALT 250 FOR IP): Performed by: STUDENT IN AN ORGANIZED HEALTH CARE EDUCATION/TRAINING PROGRAM

## 2025-01-28 PROCEDURE — 6370000000 HC RX 637 (ALT 250 FOR IP): Performed by: INTERNAL MEDICINE

## 2025-01-28 PROCEDURE — 6360000002 HC RX W HCPCS: Performed by: STUDENT IN AN ORGANIZED HEALTH CARE EDUCATION/TRAINING PROGRAM

## 2025-01-28 PROCEDURE — 2500000003 HC RX 250 WO HCPCS: Performed by: STUDENT IN AN ORGANIZED HEALTH CARE EDUCATION/TRAINING PROGRAM

## 2025-01-28 RX ADMIN — Medication 10 ML: at 22:27

## 2025-01-28 RX ADMIN — MEMANTINE 5 MG: 10 TABLET ORAL at 22:26

## 2025-01-28 RX ADMIN — MEMANTINE 5 MG: 10 TABLET ORAL at 09:01

## 2025-01-28 RX ADMIN — TRAMADOL HYDROCHLORIDE 25 MG: 50 TABLET, COATED ORAL at 22:26

## 2025-01-28 RX ADMIN — TRAMADOL HYDROCHLORIDE 25 MG: 50 TABLET, COATED ORAL at 05:23

## 2025-01-28 RX ADMIN — TRIAMTERENE AND HYDROCHLOROTHIAZIDE 1 TABLET: 37.5; 25 TABLET ORAL at 09:01

## 2025-01-28 RX ADMIN — Medication 10 ML: at 09:01

## 2025-01-28 RX ADMIN — ENOXAPARIN SODIUM 40 MG: 100 INJECTION SUBCUTANEOUS at 09:01

## 2025-01-28 RX ADMIN — TRAMADOL HYDROCHLORIDE 25 MG: 50 TABLET, COATED ORAL at 13:05

## 2025-01-28 RX ADMIN — POLYETHYLENE GLYCOL 3350 17 G: 17 POWDER, FOR SOLUTION ORAL at 09:01

## 2025-01-28 ASSESSMENT — PAIN SCALES - GENERAL
PAINLEVEL_OUTOF10: 2

## 2025-01-28 ASSESSMENT — PAIN DESCRIPTION - LOCATION
LOCATION: BACK

## 2025-01-28 ASSESSMENT — ENCOUNTER SYMPTOMS
TROUBLE SWALLOWING: 0
NAUSEA: 0
VOMITING: 0
CHEST TIGHTNESS: 0
SHORTNESS OF BREATH: 0
WHEEZING: 0
COLOR CHANGE: 0
COUGH: 0

## 2025-01-28 NOTE — PROGRESS NOTES
Physical Therapy Med Surg Daily Treatment Note  Facility/Department: [address] ORTHO TELE  Room: [facility]/[place] [address]       NAME: Sherrie Lundberg  : 1935 (89 y.o.)  MRN: [id number]  CODE STATUS: Full Code    Date of Service: 2025    Patient Diagnosis(es): Hallucinations [R44.3]  Altered mental status, unspecified altered mental status type [R41.82]  AMS (altered mental status) [R41.82]  Altered mental status [R41.82]   Chief Complaint   Patient presents with    Altered Mental Status     Patient Active Problem List    Diagnosis Date Noted    Altered mental status 2025    Metabolic encephalopathy 2025    Delusions (HCC) 2025    Hallucination 2025    AMS (altered mental status) 2025    Adenomatous polyp of sigmoid colon     Adenomatous polyp of descending colon     SBO (small bowel obstruction) (HCC) 10/26/2020    History of bladder cancer     Bladder cancer (HCC) 2020    Malignant neoplasm of posterior wall of urinary bladder (HCC)     Bladder tumor 2020    Tobacco use disorder 2020    Blood in stool     Gastric ulcer without hemorrhage, perforation, or obstruction     Personal history of colon cancer     Diverticulosis of large intestine without diverticulitis     Vitamin D deficiency 2015    Seasonal allergies     Cancer (HCC)     Cecal cancer (HCC)         Past Medical History:   Diagnosis Date    Allergic rhinitis     Arthritis     Breast cancer (HCC)     RT BREAST--dx --oral chemo -- OR    Cecal cancer (HCC)     dx --chemo    Diverticulitis     HTN (hypertension)     meds > 20 yrs    Hypercholesterolemia     past trx with meds -- off meds > 5 yrs    Seasonal allergies     Small bowel obstruction (HCC)     Tinnitus 2022         Restrictions:  Restrictions/Precautions: Fall Risk;Other (Comment)  Position Activity Restriction  Other Position/Activity Restrictions: AVASYS    SUBJECTIVE:   Pt eager to participate in PT. Pt states \"I feel great when  Devices  Type of Devices: All fall risk precautions in place, Call light within reach, Chair alarm in place, Left in chair, Telesitter in use     AMPAC (6 CLICK) BASIC MOBILITY  AM-PAC Inpatient Mobility Raw Score : 17     Therapy Time   Individual   Time In 1450   Time Out 1506   Minutes 16     Gt- 8 min   TE- 4 min   Trans- 4 min     Rocio Cleaning PTA, 01/28/25 at 3:20 PM     Definitions for assistance levels  Independent = pt does not require any physical supervision or assistance from another person for activity completion. Device may be needed.  Stand by assistance = pt requires verbal cues or instructions from another person, close to but not touching, to perform the activity  Minimal assistance= pt performs 75% or more of the activity; assistance is required to complete the activity  Moderate assistance= pt performs 50% of the activity; assistance is required to complete the activity  Maximal assistance = pt performs 25% of the activity; assistance is required to complete the activity  Dependent = pt requires total physical assistance to accomplish the task

## 2025-01-28 NOTE — PROGRESS NOTES
Hospitalist Progress Note      PCP: Alysha Castillo MD    Date of Admission: 1/24/2025    Chief Complaint:    Chief Complaint   Patient presents with    Altered Mental Status     Subjective:  No acute events overnight. Pt sitting up in the chair. Calm and cooperative at this time. Denies any acute complaints. Denies chest pain or shortness of breath.    Medications:  Reviewed    Infusion Medications    sodium chloride       Scheduled Medications    memantine  5 mg Oral BID    sodium chloride flush  5-40 mL IntraVENous 2 times per day    enoxaparin  40 mg SubCUTAneous Daily    traMADol  25 mg Oral Q8H    triamterene-hydroCHLOROthiazide  1 tablet Oral Daily     PRN Meds: sodium chloride flush, sodium chloride, potassium chloride **OR** potassium alternative oral replacement **OR** potassium chloride, magnesium sulfate, ondansetron **OR** ondansetron, polyethylene glycol, acetaminophen **OR** acetaminophen      Intake/Output Summary (Last 24 hours) at 1/28/2025 0739  Last data filed at 1/28/2025 0655  Gross per 24 hour   Intake 1000 ml   Output 700 ml   Net 300 ml     Exam:  BP (!) 153/50   Pulse 63   Temp 97.3 °F (36.3 °C) (Oral)   Resp 16   Ht 1.575 m (5' 2\")   Wt 70.4 kg (155 lb 1.6 oz)   LMP 06/30/1980   SpO2 92%   BMI 28.37 kg/m²   Physical Exam  Cardiovascular:      Rate and Rhythm: Normal rate and regular rhythm.   Pulmonary:      Effort: Pulmonary effort is normal. No respiratory distress.   Abdominal:      Palpations: Abdomen is soft.      Tenderness: There is no abdominal tenderness.   Neurological:      Mental Status: She is alert and oriented to person, place, and time.   Psychiatric:         Mood and Affect: Mood normal.         Behavior: Behavior normal.       Labs:   No results for input(s): \"WBC\", \"HGB\", \"HCT\", \"PLT\" in the last 72 hours.    Recent Labs     01/27/25  0512      K 3.5      CO2 31   BUN 33*   CREATININE 1.06*   CALCIUM 8.7     No results for input(s): \"AST\", \"ALT\",

## 2025-01-28 NOTE — PROGRESS NOTES
Neurology Follow up    SUBJECTIVE: Patient seen and examined for neurology follow-up.  Alert and oriented x 2, no acute distress, cooperative.  No current behavioral disturbances.  No seizure activity reported.  No headache.  Afebrile.  Patient is sitting up in the chair.  Very pleasant and talkative.  Patient is symptomatic and very talkative and functional  Current Facility-Administered Medications   Medication Dose Route Frequency Provider Last Rate Last Admin    memantine (NAMENDA) tablet 5 mg  5 mg Oral BID Morro Garcia MD   5 mg at 01/28/25 0901    sodium chloride flush 0.9 % injection 5-40 mL  5-40 mL IntraVENous 2 times per day Coleen Cotton MD   10 mL at 01/28/25 0901    sodium chloride flush 0.9 % injection 5-40 mL  5-40 mL IntraVENous PRN Coleen Cotton MD        0.9 % sodium chloride infusion   IntraVENous PRN Coleen Cotton MD        potassium chloride (KLOR-CON M) extended release tablet 40 mEq  40 mEq Oral PRN Coleen Cotton MD        Or    potassium bicarb-citric acid (EFFER-K) effervescent tablet 40 mEq  40 mEq Oral PRN Coleen Cotton MD        Or    potassium chloride 10 mEq/100 mL IVPB (Peripheral Line)  10 mEq IntraVENous PRColeen Yoon MD        magnesium sulfate 2000 mg in 50 mL IVPB premix  2,000 mg IntraVENous PRColeen Yoon MD        enoxaparin (LOVENOX) injection 40 mg  40 mg SubCUTAneous Daily Coleen Cotton MD   40 mg at 01/28/25 0901    ondansetron (ZOFRAN-ODT) disintegrating tablet 4 mg  4 mg Oral Q8H PRColeen Yoon MD        Or    ondansetron (ZOFRAN) injection 4 mg  4 mg IntraVENous Q6H PRColeen Yoon MD        polyethylene glycol (GLYCOLAX) packet 17 g  17 g Oral Daily PRN Coleen Cotton MD   17 g at 01/28/25 0901    acetaminophen (TYLENOL) tablet 650 mg  650 mg Oral Q6H PRN Coleen Cotton MD        Or    acetaminophen (TYLENOL) suppository 650 mg  650 mg Rectal Q6H PRColeen Yoon MD        traMADol (ULTRAM) tablet 25 mg   FINDINGS: AP overhead technique.  The patient's head and face partly obscure the thoracic inlet and superior mediastinum. Chronic mild elevation of the left hemidiaphragm.  Mild cardiomegaly.  Right upper lobe calcified granuloma.  No acute pulmonary infiltrate or pulmonary consolidation.  Allowing for overhead technique, no overt vascular congestion.  No significant pleural effusion.  Atherosclerotic thoracic aorta.  Mild S-shaped thoracolumbar scoliosis with the thoracic convexity to the right.  No pneumothorax.     1.  No acute cardiopulmonary disease. 2.  Chronic changes, as above.     XR FEMUR LEFT (MIN 2 VIEWS)    Result Date: 1/24/2025  EXAMINATION: FOUR XRAY VIEWS OF THE LEFT FEMUR 1/24/2025 8:24 am COMPARISON: None. HISTORY: ORDERING SYSTEM PROVIDED HISTORY: fall TECHNOLOGIST PROVIDED HISTORY: Reason for exam:->fall What reading provider will be dictating this exam?->CRC FINDINGS: Generalized bony demineralization consistent with the patient's age.  No fracture or acute osseous abnormality.  There is degenerative arthritis of the left hip and left knee.  No suspicious bony lesion.  Atherosclerotic calcifications are present.     1.  No fracture or acute osseous abnormality. 2.  Chronic changes, as above.     XR PELVIS (1-2 VIEWS)    Result Date: 1/24/2025  EXAMINATION: ONE XRAY VIEW OF THE PELVIS 1/24/2025 8:24 am COMPARISON: CT abdomen and pelvis 10/26/2020 HISTORY: ORDERING SYSTEM PROVIDED HISTORY: fall TECHNOLOGIST PROVIDED HISTORY: Reason for exam:->fall What reading provider will be dictating this exam?->CRC FINDINGS: Generalized bony demineralization consistent with the patient's age.  Poor visualization of the sacrum secondary to overlying bowel. No fracture or dislocation.  No suspicious bony lesion.  Bilateral hip osteoarthritis, mild to moderate on the left and mild on the right.  Normal pubic symphysis.  Poor visualization of bilateral SI joints.  Multilevel degenerative changes of the lower

## 2025-01-28 NOTE — PROGRESS NOTES
[facility] OCCUPATIONAL THERAPY MED SURG TREATMENT NOTE     Date: 2025  Patient Name: Sherrie Lundberg        MRN: [id number]  Account: [id number]   : 1935  (89 y.o.)  Room: [place] [address]    Chart Review:    Restrictions  Restrictions/Precautions  Restrictions/Precautions: Fall Risk, Other   Position Activity Restriction  Other Position/Activity Restrictions: AVASYS     Safety:  Safety Devices  Type of Devices: All fall risk precautions in place;Call light within reach;Chair alarm in place;Left in chair;Telesitter in use    Patient's birthday verified: Yes    Subjective: \"I use a 2# dumbbell at home.\"     Pain   Pain at start of treatment: No    Pain at end of treatment: No      Objective: Pt sitting up in bedside chair upon arrival with RN present in room passing medications. Introduced self, explained role of OT, and offered to assist pt with ADL completion. Pt declined reporting that she already completed today. Pt agreeable to participating in BUE exercises using 1# dumbbell. Pt completed as follows.     Upper Extremity Function  UE Strengthing:  BUE HEP (can exercises): Pt used a 1# dumbbell to engage in BUE exercises as follows:   Elbow Flexion/Extension: 1 set x 20 reps   Chest Press: 1 set x 20 reps   Shoulder Flexion: 1 set x 10 reps   Supination/Pronation: 1 set x 20 reps  Pt tolerated well with rest breaks prn. Pt completed exercises to increase BUE strength and endurance for improved transfers. Educated pt in exercise technique and provided cues to maintain.      Cognition Status:  Overall Cognitive Status: WFL  Cognition Comment: Appears WFL, pt able to discuss PLOF, home environment, and potential discharge destination    Therapy key for assistance levels -   Independent/Mod I = Pt. is able to perform task with no assistance but may require a device   Stand by assistance = Pt. does not perform task at an independent level but does not need physical assistance, requires verbal

## 2025-01-28 NOTE — CARE COORDINATION
[facility] IS INTENDING TO DENY THE PT FOR SKILLED TIME.  P2P WAS REQUESTED AND MUST BE COMPLETED BY TOMORROW BY NOON.  INFORMATION SHARED WITH PHYSICIAN.  [phone] [employer] 4 REFERENCE NUMBER [id number].

## 2025-01-28 NOTE — PROGRESS NOTES
Hospitalist Progress Note      PCP: Alysha Castillo MD    Date of Admission: 1/24/2025    Chief Complaint:    Chief Complaint   Patient presents with    Altered Mental Status     Subjective:  No acute events overnight.     Medications:  Reviewed    Infusion Medications    sodium chloride       Scheduled Medications    memantine  5 mg Oral BID    sodium chloride flush  5-40 mL IntraVENous 2 times per day    enoxaparin  40 mg SubCUTAneous Daily    traMADol  25 mg Oral Q8H    triamterene-hydroCHLOROthiazide  1 tablet Oral Daily     PRN Meds: sodium chloride flush, sodium chloride, potassium chloride **OR** potassium alternative oral replacement **OR** potassium chloride, magnesium sulfate, ondansetron **OR** ondansetron, polyethylene glycol, acetaminophen **OR** acetaminophen      Intake/Output Summary (Last 24 hours) at 1/28/2025 1435  Last data filed at 1/28/2025 1355  Gross per 24 hour   Intake 960 ml   Output 500 ml   Net 460 ml     Exam:  BP (!) 110/32   Pulse 69   Temp 97.7 °F (36.5 °C) (Oral)   Resp 16   Ht 1.575 m (5' 2\")   Wt 70.4 kg (155 lb 1.6 oz)   LMP 06/30/1980   SpO2 93%   BMI 28.37 kg/m²   Physical Exam  Cardiovascular:      Rate and Rhythm: Normal rate and regular rhythm.   Pulmonary:      Effort: Pulmonary effort is normal. No respiratory distress.   Abdominal:      Palpations: Abdomen is soft.      Tenderness: There is no abdominal tenderness.   Neurological:      Mental Status: She is alert and oriented to person, place, and time.   Psychiatric:         Mood and Affect: Mood normal.         Behavior: Behavior normal.       Labs:   No results for input(s): \"WBC\", \"HGB\", \"HCT\", \"PLT\" in the last 72 hours.    Recent Labs     01/27/25  0512      K 3.5      CO2 31   BUN 33*   CREATININE 1.06*   CALCIUM 8.7     No results for input(s): \"AST\", \"ALT\", \"BILIDIR\", \"BILITOT\", \"ALKPHOS\" in the last 72 hours.    No results for input(s): \"INR\" in the last 72 hours.    No results for input(s):  \"CKTOTAL\", \"TROPONINI\" in the last 72 hours.    Urinalysis:      Lab Results   Component Value Date/Time    NITRU Negative 01/24/2025 12:33 PM    WBCUA 3-5 01/24/2025 12:33 PM    BACTERIA Negative 01/24/2025 12:33 PM    RBCUA 6-10 01/24/2025 12:33 PM    BLOODU TRACE 01/24/2025 12:33 PM    SPECGRAV 1.015 01/23/2023 01:35 PM    GLUCOSEU Negative 01/24/2025 12:33 PM     Radiology:  XR WRIST LEFT (MIN 3 VIEWS)   Final Result   1.  No fracture or acute osseous abnormality.      2.  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  Details above.         XR CHEST PORTABLE   Final Result   1.  No acute cardiopulmonary disease.      2.  Chronic changes, as above.         XR PELVIS (1-2 VIEWS)   Final Result   1.  No fracture or acute osseous abnormality.      2.  Degenerative changes, as above.         XR FEMUR LEFT (MIN 2 VIEWS)   Final Result   1.  No fracture or acute osseous abnormality.      2.  Chronic changes, as above.         CT Head W/O Contrast   Final Result   1. There is no acute intracranial abnormality.  Specifically, there is no   intracranial hemorrhage.   2. Atrophy and periventricular microvascular ischemic disease,         CT CERVICAL SPINE WO CONTRAST   Final Result   1. There is no acute compression fracture or subluxation of the cervical   spine.   2. Multilevel degenerative disc and degenerative joint disease.           Assessment/Plan:    Active Hospital Problems    Diagnosis Date Noted    Altered mental status [R41.82] 01/28/2025    Metabolic encephalopathy [G93.41] 01/27/2025    Delusions (HCC) [F22] 01/27/2025    Hallucination [R44.3] 01/27/2025    AMS (altered mental status) [R41.82] 01/24/2025     Altered mental status  -CBC and CMP unremarkable, UA with no evidence of infection, CT of the head negative for acute abnormalities  -Concern for possible underlying early cognitive impairment  Plan:  - neurology following, appreciate their assistance  - continue memantine    Unwitnessed fall  -

## 2025-01-28 NOTE — PLAN OF CARE
Problem: Discharge Planning  Goal: Discharge to home or other facility with appropriate resources  1/27/2025 2319 by Jaja Edwards, RN  Outcome: Progressing  1/27/2025 1821 by Mi Chi, RN  Outcome: Progressing     Problem: Pain  Goal: Verbalizes/displays adequate comfort level or baseline comfort level  1/27/2025 2319 by Jaja Edwards, RN  Outcome: Progressing  1/27/2025 1821 by Mi Chi, RN  Outcome: Progressing     Problem: Safety - Adult  Goal: Free from fall injury  1/27/2025 2319 by Jaja Edwards, RN  Outcome: Progressing  1/27/2025 1821 by Mi Chi, RN  Outcome: Progressing

## 2025-01-28 NOTE — PLAN OF CARE
Problem: Discharge Planning  Goal: Discharge to home or other facility with appropriate resources  1/28/2025 1052 by Shania Cunningham RN  Outcome: Progressing  1/27/2025 2319 by Jaja Edwards RN  Outcome: Progressing     Problem: Pain  Goal: Verbalizes/displays adequate comfort level or baseline comfort level  1/28/2025 1052 by Shania Cunningham RN  Outcome: Progressing  1/27/2025 2319 by Jaja Edwards RN  Outcome: Progressing     Problem: Safety - Adult  Goal: Free from fall injury  1/28/2025 1052 by Shania Cunningham RN  Outcome: Progressing  1/27/2025 2319 by Jaja Edwards RN  Outcome: Progressing     Problem: ABCDS Injury Assessment  Goal: Absence of physical injury  1/28/2025 1052 by Shania Cunningham RN  Outcome: Progressing  1/27/2025 2319 by Jaja Edwards RN  Outcome: Progressing     Problem: Neurosensory - Adult  Goal: Achieves stable or improved neurological status  1/28/2025 1052 by Shania Cunningham RN  Outcome: Progressing  1/27/2025 2319 by Jaja Edwards RN  Outcome: Progressing  Goal: Absence of seizures  1/28/2025 1052 by Shania Cunningham RN  Outcome: Progressing  1/27/2025 2319 by Jaja Edwards RN  Outcome: Progressing  Goal: Remains free of injury related to seizures activity  1/28/2025 1052 by Shania Cunningham RN  Outcome: Progressing  1/27/2025 2319 by Jaja Edwards RN  Outcome: Progressing  Goal: Achieves maximal functionality and self care  1/28/2025 1052 by Shania Cunningham RN  Outcome: Progressing  1/27/2025 2319 by Jaja Edwards RN  Outcome: Progressing     Problem: Skin/Tissue Integrity - Adult  Goal: Skin integrity remains intact  1/28/2025 1052 by Shania Cunningham RN  Outcome: Progressing  1/27/2025 2319 by Jaja Edwards RN  Outcome: Progressing  Goal: Incisions, wounds, or drain sites healing without S/S of infection  1/28/2025 1052 by Shania Cunningham, RN  Outcome: Progressing  1/27/2025 2319 by  Jaja Edwards, RN  Outcome: Progressing  Goal: Oral mucous membranes remain intact  1/28/2025 1052 by Shania Cunningham RN  Outcome: Progressing  1/27/2025 2319 by Jaja Edwards RN  Outcome: Progressing     Problem: Musculoskeletal - Adult  Goal: Return mobility to safest level of function  1/28/2025 1052 by Shania Cunningham RN  Outcome: Progressing  1/27/2025 2319 by Jaja Edwards RN  Outcome: Progressing  Goal: Maintain proper alignment of affected body part  1/28/2025 1052 by Shania Cunningham RN  Outcome: Progressing  1/27/2025 2319 by Jaja Edwards RN  Outcome: Progressing  Goal: Return ADL status to a safe level of function  1/28/2025 1052 by Shania Cunningham RN  Outcome: Progressing  1/27/2025 2319 by Jaja Edwards RN  Outcome: Progressing

## 2025-01-29 LAB
ANION GAP SERPL CALCULATED.3IONS-SCNC: 12 MEQ/L (ref 9–15)
BASOPHILS # BLD: 0.1 K/UL (ref 0–0.2)
BASOPHILS NFR BLD: 1 %
BUN SERPL-MCNC: 27 MG/DL (ref 8–23)
CALCIUM SERPL-MCNC: 9.4 MG/DL (ref 8.5–9.9)
CHLORIDE SERPL-SCNC: 97 MEQ/L (ref 95–107)
CO2 SERPL-SCNC: 29 MEQ/L (ref 20–31)
CREAT SERPL-MCNC: 0.81 MG/DL (ref 0.5–0.9)
EOSINOPHIL # BLD: 0.1 K/UL (ref 0–0.7)
EOSINOPHIL NFR BLD: 1 %
ERYTHROCYTE [DISTWIDTH] IN BLOOD BY AUTOMATED COUNT: 13.4 % (ref 11.5–14.5)
GLUCOSE SERPL-MCNC: 82 MG/DL (ref 70–99)
HCT VFR BLD AUTO: 47 % (ref 37–47)
HGB BLD-MCNC: 15.4 G/DL (ref 12–16)
LYMPHOCYTES # BLD: 0.6 K/UL (ref 1–4.8)
LYMPHOCYTES NFR BLD: 11 %
MCH RBC QN AUTO: 30.9 PG (ref 27–31.3)
MCHC RBC AUTO-ENTMCNC: 32.8 % (ref 33–37)
MCV RBC AUTO: 94.2 FL (ref 79.4–94.8)
MONOCYTES # BLD: 0.2 K/UL (ref 0.2–0.8)
MONOCYTES NFR BLD: 3.8 %
NEUTROPHILS # BLD: 4.5 K/UL (ref 1.4–6.5)
NEUTS SEG NFR BLD: 83 %
NEUTS VAC BLD QL SMEAR: ABNORMAL
PLATELET # BLD AUTO: ABNORMAL K/UL (ref 130–400)
PLATELET BLD QL SMEAR: ABNORMAL
POTASSIUM SERPL-SCNC: 5.1 MEQ/L (ref 3.4–4.9)
RBC # BLD AUTO: 4.99 M/UL (ref 4.2–5.4)
SLIDE REVIEW: ABNORMAL
SMUDGE CELLS BLD QL SMEAR: 1
SODIUM SERPL-SCNC: 138 MEQ/L (ref 135–144)
TOXIC GRANULATION: ABNORMAL
WBC # BLD AUTO: 5.4 K/UL (ref 4.8–10.8)

## 2025-01-29 PROCEDURE — 6370000000 HC RX 637 (ALT 250 FOR IP): Performed by: PSYCHIATRY & NEUROLOGY

## 2025-01-29 PROCEDURE — 6370000000 HC RX 637 (ALT 250 FOR IP): Performed by: INTERNAL MEDICINE

## 2025-01-29 PROCEDURE — 6360000002 HC RX W HCPCS: Performed by: STUDENT IN AN ORGANIZED HEALTH CARE EDUCATION/TRAINING PROGRAM

## 2025-01-29 PROCEDURE — 80048 BASIC METABOLIC PNL TOTAL CA: CPT

## 2025-01-29 PROCEDURE — 85025 COMPLETE CBC W/AUTO DIFF WBC: CPT

## 2025-01-29 PROCEDURE — 2500000003 HC RX 250 WO HCPCS: Performed by: STUDENT IN AN ORGANIZED HEALTH CARE EDUCATION/TRAINING PROGRAM

## 2025-01-29 PROCEDURE — 1210000000 HC MED SURG R&B

## 2025-01-29 PROCEDURE — 36415 COLL VENOUS BLD VENIPUNCTURE: CPT

## 2025-01-29 PROCEDURE — 6360000002 HC RX W HCPCS: Performed by: INTERNAL MEDICINE

## 2025-01-29 PROCEDURE — 97116 GAIT TRAINING THERAPY: CPT

## 2025-01-29 RX ORDER — HALOPERIDOL 5 MG/ML
0.5 INJECTION INTRAMUSCULAR EVERY 6 HOURS PRN
Status: DISCONTINUED | OUTPATIENT
Start: 2025-01-29 | End: 2025-02-02 | Stop reason: HOSPADM

## 2025-01-29 RX ADMIN — TRAMADOL HYDROCHLORIDE 25 MG: 50 TABLET, COATED ORAL at 04:40

## 2025-01-29 RX ADMIN — SODIUM CHLORIDE, PRESERVATIVE FREE 10 ML: 5 INJECTION INTRAVENOUS at 23:01

## 2025-01-29 RX ADMIN — Medication 10 ML: at 10:08

## 2025-01-29 RX ADMIN — MEMANTINE 5 MG: 10 TABLET ORAL at 23:00

## 2025-01-29 RX ADMIN — TRIAMTERENE AND HYDROCHLOROTHIAZIDE 1 TABLET: 37.5; 25 TABLET ORAL at 10:08

## 2025-01-29 RX ADMIN — MEMANTINE 5 MG: 10 TABLET ORAL at 10:08

## 2025-01-29 RX ADMIN — TRAMADOL HYDROCHLORIDE 25 MG: 50 TABLET, COATED ORAL at 23:00

## 2025-01-29 RX ADMIN — ENOXAPARIN SODIUM 40 MG: 100 INJECTION SUBCUTANEOUS at 10:08

## 2025-01-29 RX ADMIN — TRAMADOL HYDROCHLORIDE 25 MG: 50 TABLET, COATED ORAL at 14:34

## 2025-01-29 RX ADMIN — HALOPERIDOL LACTATE 0.5 MG: 5 INJECTION, SOLUTION INTRAMUSCULAR at 12:40

## 2025-01-29 ASSESSMENT — PAIN SCALES - GENERAL
PAINLEVEL_OUTOF10: 4
PAINLEVEL_OUTOF10: 3
PAINLEVEL_OUTOF10: 0

## 2025-01-29 ASSESSMENT — PAIN DESCRIPTION - LOCATION
LOCATION: BACK
LOCATION: BACK

## 2025-01-29 ASSESSMENT — PAIN DESCRIPTION - ORIENTATION: ORIENTATION: LOWER

## 2025-01-29 ASSESSMENT — PAIN DESCRIPTION - DESCRIPTORS
DESCRIPTORS: ACHING
DESCRIPTORS: ACHING

## 2025-01-29 NOTE — FLOWSHEET NOTE
Pt very agitated in room, confused, and non redirectable by staff. MD made aware, new orders received.

## 2025-01-29 NOTE — PLAN OF CARE
Problem: Discharge Planning  Goal: Discharge to home or other facility with appropriate resources  1/29/2025 1110 by Bernarda Cotton RN  Outcome: Progressing  1/28/2025 2302 by Jaja Edwards RN  Outcome: Progressing     Problem: Pain  Goal: Verbalizes/displays adequate comfort level or baseline comfort level  1/29/2025 1110 by Bernarda Cotton RN  Outcome: Progressing  1/28/2025 2302 by Jaja Edwards RN  Outcome: Progressing     Problem: Safety - Adult  Goal: Free from fall injury  1/29/2025 1110 by Bernarda Cotton RN  Outcome: Progressing  1/28/2025 2302 by Jaja Edwards RN  Outcome: Progressing     Problem: ABCDS Injury Assessment  Goal: Absence of physical injury  1/29/2025 1110 by Bernarda Cotton RN  Outcome: Progressing  1/28/2025 2302 by Jaja Edwards RN  Outcome: Progressing     Problem: Neurosensory - Adult  Goal: Achieves stable or improved neurological status  1/29/2025 1110 by Bernarda Cotton RN  Outcome: Progressing  1/28/2025 2302 by Jaja Edwards RN  Outcome: Progressing  Goal: Absence of seizures  1/29/2025 1110 by Bernarda Cotton RN  Outcome: Progressing  1/28/2025 2302 by Jaja Edwards RN  Outcome: Progressing  Goal: Remains free of injury related to seizures activity  1/29/2025 1110 by Bernarda Cotton RN  Outcome: Progressing  1/28/2025 2302 by Jaja Edwards RN  Outcome: Progressing  Goal: Achieves maximal functionality and self care  1/29/2025 1110 by Bernarda Cotton RN  Outcome: Progressing  1/28/2025 2302 by Jaja Edwards RN  Outcome: Progressing     Problem: Skin/Tissue Integrity - Adult  Goal: Skin integrity remains intact  1/29/2025 1110 by Bernarda Cotton RN  Outcome: Progressing  1/28/2025 2302 by Jaja Edwards RN  Outcome: Progressing  Goal: Incisions, wounds, or drain sites healing without S/S of infection  1/29/2025 1110 by Bernarda Cotton RN  Outcome: Progressing  1/28/2025 2302 by Jaja Edwards RN  Outcome: Progressing  Goal: Oral mucous membranes remain  intact  1/29/2025 1110 by Bernarda Cotton RN  Outcome: Progressing  1/28/2025 2302 by Jaja Edwards RN  Outcome: Progressing     Problem: Musculoskeletal - Adult  Goal: Return mobility to safest level of function  1/29/2025 1110 by Bernarda Cotton RN  Outcome: Progressing  1/28/2025 2302 by Jaja Edwards RN  Outcome: Progressing  Goal: Maintain proper alignment of affected body part  1/29/2025 1110 by Bernarda Cotton RN  Outcome: Progressing  1/28/2025 2302 by Jaja Edwards RN  Outcome: Progressing  Goal: Return ADL status to a safe level of function  1/29/2025 1110 by Bernarda Cotton RN  Outcome: Progressing  1/28/2025 2302 by Jaja Edwards RN  Outcome: Progressing

## 2025-01-29 NOTE — PROGRESS NOTES
Hospitalist Progress Note      PCP: Alysha Castillo MD    Date of Admission: 1/24/2025    Chief Complaint:    Chief Complaint   Patient presents with    Altered Mental Status     Subjective:  No acute events overnight.     Medications:  Reviewed    Infusion Medications    sodium chloride       Scheduled Medications    memantine  5 mg Oral BID    sodium chloride flush  5-40 mL IntraVENous 2 times per day    enoxaparin  40 mg SubCUTAneous Daily    traMADol  25 mg Oral Q8H    triamterene-hydroCHLOROthiazide  1 tablet Oral Daily     PRN Meds: haloperidol lactate, sodium chloride flush, sodium chloride, potassium chloride **OR** potassium alternative oral replacement **OR** potassium chloride, magnesium sulfate, ondansetron **OR** ondansetron, polyethylene glycol, acetaminophen **OR** acetaminophen      Intake/Output Summary (Last 24 hours) at 1/29/2025 1409  Last data filed at 1/28/2025 2226  Gross per 24 hour   Intake 150 ml   Output 150 ml   Net 0 ml     Exam:  BP (!) 141/41   Pulse 63   Temp 97.5 °F (36.4 °C) (Oral)   Resp 16   Ht 1.575 m (5' 2\")   Wt 70.4 kg (155 lb 1.6 oz)   LMP 06/30/1980   SpO2 92%   BMI 28.37 kg/m²   Physical Exam  Cardiovascular:      Rate and Rhythm: Normal rate and regular rhythm.   Pulmonary:      Effort: Pulmonary effort is normal. No respiratory distress.   Abdominal:      Palpations: Abdomen is soft.      Tenderness: There is no abdominal tenderness.   Neurological:      Mental Status: She is alert and oriented to person, place, and time.   Psychiatric:         Mood and Affect: Mood normal.         Behavior: Behavior normal.       Labs:   No results for input(s): \"WBC\", \"HGB\", \"HCT\", \"PLT\" in the last 72 hours.    Recent Labs     01/27/25  0512      K 3.5      CO2 31   BUN 33*   CREATININE 1.06*   CALCIUM 8.7     No results for input(s): \"AST\", \"ALT\", \"BILIDIR\", \"BILITOT\", \"ALKPHOS\" in the last 72 hours.    No results for input(s): \"INR\" in the last 72 hours.    No

## 2025-01-29 NOTE — PROGRESS NOTES
Physical Therapy Med Surg Daily Treatment Note  Facility/Department: [address] ORTHO TELE  Room: [facility]/[place] [address]       NAME: Sherrie Lundberg  : 1935 (89 y.o.)  MRN: [id number]  CODE STATUS: Full Code    Date of Service: 2025    Patient Diagnosis(es): Hallucinations [R44.3]  Altered mental status, unspecified altered mental status type [R41.82]  AMS (altered mental status) [R41.82]  Altered mental status [R41.82]   Chief Complaint   Patient presents with    Altered Mental Status     Patient Active Problem List    Diagnosis Date Noted    Altered mental status 2025    Metabolic encephalopathy 2025    Delusions (HCC) 2025    Hallucination 2025    AMS (altered mental status) 2025    Adenomatous polyp of sigmoid colon     Adenomatous polyp of descending colon     SBO (small bowel obstruction) (HCC) 10/26/2020    History of bladder cancer     Bladder cancer (HCC) 2020    Malignant neoplasm of posterior wall of urinary bladder (HCC)     Bladder tumor 2020    Tobacco use disorder 2020    Blood in stool     Gastric ulcer without hemorrhage, perforation, or obstruction     Personal history of colon cancer     Diverticulosis of large intestine without diverticulitis     Vitamin D deficiency 2015    Seasonal allergies     Cancer (HCC)     Cecal cancer (HCC)         Past Medical History:   Diagnosis Date    Allergic rhinitis     Arthritis     Breast cancer (HCC)     RT BREAST--dx --oral chemo -- OR    Cecal cancer (HCC)     dx --chemo    Diverticulitis     HTN (hypertension)     meds > 20 yrs    Hypercholesterolemia     past trx with meds -- off meds > 5 yrs    Seasonal allergies     Small bowel obstruction (HCC)     Tinnitus 2022     Past Surgical History:   Procedure Laterality Date    APPENDECTOMY      age 30s    BREAST SURGERY Right 1987    mastectomy    CATARACT REMOVAL      CHOLECYSTECTOMY  1970    COLONOSCOPY   &     COLONOSCOPY

## 2025-01-29 NOTE — PLAN OF CARE
Problem: Discharge Planning  Goal: Discharge to home or other facility with appropriate resources  1/28/2025 2302 by Jaja Edwards RN  Outcome: Progressing  1/28/2025 1052 by Shania Cunningham RN  Outcome: Progressing     Problem: Pain  Goal: Verbalizes/displays adequate comfort level or baseline comfort level  1/28/2025 2302 by Jaja Edwards RN  Outcome: Progressing  1/28/2025 1052 by Shania Cunningham RN  Outcome: Progressing     Problem: Safety - Adult  Goal: Free from fall injury  1/28/2025 2302 by Jaja Edwards RN  Outcome: Progressing  1/28/2025 1052 by Shania Cunningham RN  Outcome: Progressing     Problem: ABCDS Injury Assessment  Goal: Absence of physical injury  1/28/2025 2302 by Jaja Edwards RN  Outcome: Progressing  1/28/2025 1052 by Shania Cunningham RN  Outcome: Progressing     Problem: Neurosensory - Adult  Goal: Achieves stable or improved neurological status  1/28/2025 2302 by Jaja Edwards RN  Outcome: Progressing  1/28/2025 1052 by Shania Cunningham RN  Outcome: Progressing  Goal: Absence of seizures  1/28/2025 2302 by Jaja Edwards RN  Outcome: Progressing  1/28/2025 1052 by Shania Cunningham RN  Outcome: Progressing  Goal: Remains free of injury related to seizures activity  1/28/2025 2302 by Jaja Edwards RN  Outcome: Progressing  1/28/2025 1052 by Shania Cunningham, RN  Outcome: Progressing  Goal: Achieves maximal functionality and self care  1/28/2025 2302 by Jaja Edwards RN  Outcome: Progressing  1/28/2025 1052 by Shania Cunningham, RN  Outcome: Progressing

## 2025-01-29 NOTE — CARE COORDINATION
LSW CALLED THE PT'S POA TO PREPARE HIM THAT INSURANCE DENIED A SNF STAY FOR THE PT AND WE ARE NOW LOOKING AT HOME WITH [facility] TODAY.  POA WAS VERY UPSET AND FEELS THAT THE PT IS UNSAFE TO GO HOME.  LSW PASSED THIS INFORMATION ON TO THE PHYSICIAN.  LSW CALLED ASSISTED LIVING FACILITIES TO SEE IF THEY ALLOW RESPITE STAYS AND TO GET PRICING.  LSW CALLED [facility] TO GET PRICING AND AVAILABILITY IF POSSIBLE.  PHYSICIAN IS COMPLETING A P2P.      P2P WAS DENIED AND LSW CALLED POA TO INFORM HIM OF THE DENIAL HE WAS ANGRY AND YELLING AT THIS LSW FOR THINGS BEYOND MY CONTROL.  LSW GAVE THE POA THE PRICING FOR [facility] AND THEY ARE WILLING TO TAKE THE PT RESPITE STAY WHILE POA IS LOOKING FOR PERMANENT PLACEMENT.  LSW SPOKE WITH ANEESH AT [facility] AND SHE IS WILLING TO PROVIDE A CAREGIVER TODAY IF POA AGREES.  POA WANTS 48 HOURS TO LOOK AT OPTIONS AND DECIDE PLACEMENT.

## 2025-01-29 NOTE — PROGRESS NOTES
Insurance continued to deny SNF after P:P    Lila Cedillo, [facility]

## 2025-01-29 NOTE — PROGRESS NOTES
Called patient's insurance and left my information with my availability of anytime today and tomorrow for the insurance physician to call back for a peer to peer.  Will await insurance and physician callback,     Lila Cedillo,   [facility]

## 2025-01-30 PROCEDURE — 6370000000 HC RX 637 (ALT 250 FOR IP): Performed by: PSYCHIATRY & NEUROLOGY

## 2025-01-30 PROCEDURE — 99232 SBSQ HOSP IP/OBS MODERATE 35: CPT | Performed by: PSYCHIATRY & NEUROLOGY

## 2025-01-30 PROCEDURE — 2500000003 HC RX 250 WO HCPCS: Performed by: STUDENT IN AN ORGANIZED HEALTH CARE EDUCATION/TRAINING PROGRAM

## 2025-01-30 PROCEDURE — APPSS15 APP SPLIT SHARED TIME 0-15 MINUTES: Performed by: NURSE PRACTITIONER

## 2025-01-30 PROCEDURE — 97116 GAIT TRAINING THERAPY: CPT

## 2025-01-30 PROCEDURE — 6370000000 HC RX 637 (ALT 250 FOR IP): Performed by: INTERNAL MEDICINE

## 2025-01-30 PROCEDURE — 1210000000 HC MED SURG R&B

## 2025-01-30 PROCEDURE — 6360000002 HC RX W HCPCS: Performed by: STUDENT IN AN ORGANIZED HEALTH CARE EDUCATION/TRAINING PROGRAM

## 2025-01-30 RX ORDER — MEMANTINE HYDROCHLORIDE 5 MG/1
5 TABLET ORAL 2 TIMES DAILY
Qty: 60 TABLET | Refills: 3 | Status: SHIPPED | OUTPATIENT
Start: 2025-01-30

## 2025-01-30 RX ADMIN — MEMANTINE 5 MG: 10 TABLET ORAL at 09:48

## 2025-01-30 RX ADMIN — TRAMADOL HYDROCHLORIDE 25 MG: 50 TABLET, COATED ORAL at 15:02

## 2025-01-30 RX ADMIN — MEMANTINE 5 MG: 10 TABLET ORAL at 22:32

## 2025-01-30 RX ADMIN — ENOXAPARIN SODIUM 40 MG: 100 INJECTION SUBCUTANEOUS at 09:48

## 2025-01-30 RX ADMIN — Medication 10 ML: at 09:48

## 2025-01-30 RX ADMIN — Medication 10 ML: at 22:32

## 2025-01-30 RX ADMIN — TRAMADOL HYDROCHLORIDE 25 MG: 50 TABLET, COATED ORAL at 22:31

## 2025-01-30 RX ADMIN — TRIAMTERENE AND HYDROCHLOROTHIAZIDE 1 TABLET: 37.5; 25 TABLET ORAL at 09:48

## 2025-01-30 ASSESSMENT — ENCOUNTER SYMPTOMS
WHEEZING: 0
COLOR CHANGE: 0
NAUSEA: 0
CHEST TIGHTNESS: 0
SHORTNESS OF BREATH: 0
COUGH: 0
TROUBLE SWALLOWING: 0
VOMITING: 0

## 2025-01-30 ASSESSMENT — PAIN DESCRIPTION - ORIENTATION
ORIENTATION: LOWER
ORIENTATION: UPPER;LOWER

## 2025-01-30 ASSESSMENT — PAIN SCALES - GENERAL
PAINLEVEL_OUTOF10: 0
PAINLEVEL_OUTOF10: 2
PAINLEVEL_OUTOF10: 2

## 2025-01-30 ASSESSMENT — PAIN DESCRIPTION - DESCRIPTORS
DESCRIPTORS: ACHING
DESCRIPTORS: ACHING

## 2025-01-30 ASSESSMENT — PAIN DESCRIPTION - LOCATION
LOCATION: BACK
LOCATION: BACK

## 2025-01-30 NOTE — PLAN OF CARE
Problem: Discharge Planning  Goal: Discharge to home or other facility with appropriate resources  1/30/2025 1148 by Aishwarya Crowell RN  Outcome: Progressing     Problem: Pain  Goal: Verbalizes/displays adequate comfort level or baseline comfort level  1/30/2025 1148 by Aishwarya Crowell RN  Outcome: Progressing     Problem: Safety - Adult  Goal: Free from fall injury  1/30/2025 1148 by Aishwarya Crowell RN  Outcome: Progressing     Problem: ABCDS Injury Assessment  Goal: Absence of physical injury  1/30/2025 1148 by Aishwarya Crowell RN  Outcome: Progressing     Problem: Neurosensory - Adult  Goal: Achieves stable or improved neurological status  1/30/2025 1148 by Aishwarya Crowell RN  Outcome: Progressing     Problem: Neurosensory - Adult  Goal: Absence of seizures  1/30/2025 1148 by Aishwarya Crowell RN  Outcome: Progressing     Problem: Neurosensory - Adult  Goal: Remains free of injury related to seizures activity  1/30/2025 1148 by Aishwarya Crowell RN  Outcome: Progressing     Problem: Neurosensory - Adult  Goal: Achieves maximal functionality and self care  1/30/2025 1148 by Aishwarya Crowell RN  Outcome: Progressing     Problem: Skin/Tissue Integrity - Adult  Goal: Skin integrity remains intact  1/30/2025 1148 by Aishwarya Crowell RN  Outcome: Progressing     Problem: Skin/Tissue Integrity - Adult  Goal: Incisions, wounds, or drain sites healing without S/S of infection  1/30/2025 1148 by Aishwarya Crowell RN  Outcome: Progressing     Problem: Skin/Tissue Integrity - Adult  Goal: Oral mucous membranes remain intact  1/30/2025 1148 by Aishwarya Crowell RN  Outcome: Progressing     Problem: Musculoskeletal - Adult  Goal: Return mobility to safest level of function  1/30/2025 1148 by Aishwarya Crowell RN  Outcome: Progressing     Problem: Musculoskeletal - Adult  Goal: Maintain proper alignment of affected body part  1/30/2025 1148 by Aishwarya Crowell RN  Outcome: Progressing     Problem:  Musculoskeletal - Adult  Goal: Return ADL status to a safe level of function  1/30/2025 1148 by Aishwarya Crowell, RN  Outcome: Progressing

## 2025-01-30 NOTE — PROGRESS NOTES
Hospitalist Progress Note      PCP: Alysha Castillo MD    Date of Admission: 1/24/2025    Chief Complaint:    Chief Complaint   Patient presents with    Altered Mental Status     Subjective:  No acute events overnight.     Medications:  Reviewed    Infusion Medications    sodium chloride       Scheduled Medications    memantine  5 mg Oral BID    sodium chloride flush  5-40 mL IntraVENous 2 times per day    enoxaparin  40 mg SubCUTAneous Daily    traMADol  25 mg Oral Q8H    triamterene-hydroCHLOROthiazide  1 tablet Oral Daily     PRN Meds: haloperidol lactate, sodium chloride flush, sodium chloride, potassium chloride **OR** potassium alternative oral replacement **OR** potassium chloride, magnesium sulfate, ondansetron **OR** ondansetron, polyethylene glycol, acetaminophen **OR** acetaminophen    No intake or output data in the 24 hours ending 01/30/25 1436    Exam:  BP (!) 102/42   Pulse 65   Temp 98.1 °F (36.7 °C) (Oral)   Resp 18   Ht 1.575 m (5' 2\")   Wt 70.4 kg (155 lb 1.6 oz)   LMP 06/30/1980   SpO2 96%   BMI 28.37 kg/m²   Physical Exam  Cardiovascular:      Rate and Rhythm: Normal rate and regular rhythm.   Pulmonary:      Effort: Pulmonary effort is normal. No respiratory distress.   Abdominal:      Palpations: Abdomen is soft.      Tenderness: There is no abdominal tenderness.   Neurological:      Mental Status: She is alert and oriented to person, place, and time.   Psychiatric:         Mood and Affect: Mood normal.         Behavior: Behavior normal.       Labs:   Recent Labs     01/29/25  1436   WBC 5.4   HGB 15.4   HCT 47.0   PLT NOT DONE       Recent Labs     01/29/25  1436      K 5.1*   CL 97   CO2 29   BUN 27*   CREATININE 0.81   CALCIUM 9.4     No results for input(s): \"AST\", \"ALT\", \"BILIDIR\", \"BILITOT\", \"ALKPHOS\" in the last 72 hours.    No results for input(s): \"INR\" in the last 72 hours.    No results for input(s): \"CKTOTAL\", \"TROPONINI\" in the last 72 hours.    Urinalysis:      Lab

## 2025-01-30 NOTE — CARE COORDINATION
RA spoke with Lena at [facility] and they spoke with pt's POA and he agreed that they could do an onsite assessment today.  He plans to tour the facility after work today.  RUDDYW awaiting direction from POA.     POA called and is still looking at options and is requesting more time to decide where the pt will go from here.   RA explained to POA that the pt is discharged and he will need to appeal the DC if he cannot take her home today.  He plans to appeal.  RA LEFT COPY OF THE IMM AT THE BEDSIDE FOR PT'S POA.

## 2025-01-30 NOTE — DISCHARGE SUMMARY
cisterns and sulci are prominent consistent with atrophy.  There is decreased attenuation within the periventricular white matter consistent with periventricular microvascular ischemic disease. ORBITS: The visualized portion of the orbits demonstrate no acute abnormality. SINUSES: The visualized paranasal sinuses and mastoid air cells demonstrate no acute abnormality. SOFT TISSUES/SKULL:  No acute abnormality of the visualized skull or soft tissues.     1. There is no acute intracranial abnormality.  Specifically, there is no intracranial hemorrhage. 2. Atrophy and periventricular microvascular ischemic disease,       Discharge Medications:         Medication List        START taking these medications      memantine 5 MG tablet  Commonly known as: NAMENDA  Take 1 tablet by mouth 2 times daily            CONTINUE taking these medications      b complex vitamins capsule     CLARITIN PO     Fish Oil 1200 MG Caps     [employer] PO     polyethylene glycol 17 GM/SCOOP powder  Commonly known as: GLYCOLAX     PRESERVISION AREDS PO     traMADol 50 MG tablet  Commonly known as: ULTRAM     triamterene-hydroCHLOROthiazide 37.5-25 MG per tablet  Commonly known as: MAXZIDE-25     vitamin D 50 MCG (2000 UT) Caps capsule  Commonly known as: CHOLECALCIFEROL               Where to Get Your Medications        These medications were sent to [id number] - [place], [place] - [address] - P [phone] - F [phone]  [address] [place] [address]      Phone: [phone]   memantine 5 MG tablet         Disposition:   If discharged to Home, Any [facility] needs that were indicated and/or required as been addressed and set up by Social Work.     Condition at discharge: good     Activity: activity as tolerated    Total time taken for discharging this patient: 40 minutes. Greater than 70% of time was spent focused exclusively on this patient. Time was taken to review chart, discuss plans with consultants,  reconciling medications, discussing plan answering questions with patient.     Signed:  Lila Cedillo DO  1/31/2025, 1:18 PM  ----------------------------------------------------------------------------------------------------------------------    Sherrie Lundberg,

## 2025-01-30 NOTE — PROGRESS NOTES
Call received from Art Albert. Updated on pt condition as requested. He stated he would be visiting SNFs tomorrow for possible placement, but was unsure if he would be in to see pt. All questions answered at this time

## 2025-01-30 NOTE — PROGRESS NOTES
Neurology Follow up    SUBJECTIVE: Patient seen and examined for neurology follow-up.  Alert and oriented x 2, no acute distress, cooperative.  No current behavioral disturbances.  No seizure activity reported.  No headache.  Afebrile.  No changes overnight occasionally fluctuates.  Current Facility-Administered Medications   Medication Dose Route Frequency Provider Last Rate Last Admin    haloperidol lactate (HALDOL) injection 0.5 mg  0.5 mg IntraMUSCular Q6H PRN Mamadou Yamarah R, DO   0.5 mg at 01/29/25 1240    memantine (NAMENDA) tablet 5 mg  5 mg Oral BID Morro Garcia MD   5 mg at 01/30/25 0948    sodium chloride flush 0.9 % injection 5-40 mL  5-40 mL IntraVENous 2 times per day Coleen Cotton MD   10 mL at 01/30/25 0948    sodium chloride flush 0.9 % injection 5-40 mL  5-40 mL IntraVENous PRN Coleen Cotton MD   10 mL at 01/29/25 2301    0.9 % sodium chloride infusion   IntraVENous PRN Coleen Cotton MD        potassium chloride (KLOR-CON M) extended release tablet 40 mEq  40 mEq Oral PRN Coleen Cotton MD        Or    potassium bicarb-citric acid (EFFER-K) effervescent tablet 40 mEq  40 mEq Oral PRN Coleen Cotton MD        Or    potassium chloride 10 mEq/100 mL IVPB (Peripheral Line)  10 mEq IntraVENous PRN Coleen Cotton MD        magnesium sulfate 2000 mg in 50 mL IVPB premix  2,000 mg IntraVENous PRN Coleen Cotton MD        enoxaparin (LOVENOX) injection 40 mg  40 mg SubCUTAneous Daily Coleen Cotton MD   40 mg at 01/30/25 0948    ondansetron (ZOFRAN-ODT) disintegrating tablet 4 mg  4 mg Oral Q8H PRN Coleen Cotton MD        Or    ondansetron (ZOFRAN) injection 4 mg  4 mg IntraVENous Q6H PRN Coleen Cotton MD        polyethylene glycol (GLYCOLAX) packet 17 g  17 g Oral Daily PRN Coleen Cotton MD   17 g at 01/28/25 0901    acetaminophen (TYLENOL) tablet 650 mg  650 mg Oral Q6H PRN Coleen Cotton MD        Or    acetaminophen (TYLENOL) suppository 650 mg  650 mg  extremities bilaterally  Tone:  normal  Abnormal Movements:  absent            Sensory:        Pinprick             Right Upper Extremity:  normal             Left Upper Extremity:  normal             Right Lower Extremity:  normal             Left Lower Extremity:  normal           Vibration                         Touch            Proprioception                 Coordination:           Finger/Nose   Right:  normal              Left:  normal          Heel-Knee-Shin                Right:  normal              Left:  normal          Rapid Alternating Movements              Right:  normal              Left:  normal          Gait:                       Casual: Gait is deferred         Reflexes:             Deep Tendon Reflexes:             Reflexes are 2 +             Plantar response:                Right:  downgoing               Left:  downgoing  Nonfocal examination  Vascular:  Cardiac Exam:  normal         XR WRIST LEFT (MIN 3 VIEWS)    Result Date: 1/24/2025  EXAMINATION: THREE  XRAY VIEWS OF THE LEFT WRIST 1/24/2025 10:31 am COMPARISON: None. HISTORY: ORDERING SYSTEM PROVIDED HISTORY: fall TECHNOLOGIST PROVIDED HISTORY: Reason for exam:->fall What reading provider will be dictating this exam?->CRC FINDINGS: Intravenous cannula in place at the radial aspect of the distal left forearm. No fracture or dislocation. Osteoarthritis is present with severe narrowing of the 1st CMC joint accompanied by subchondral sclerosis, marginal spurring, and periarticular ossicle formation.  There is additional osteoarthritis of the triscaphe joint.  Soft tissues show no gross abnormality.     1.  No fracture or acute osseous abnormality. 2.  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  Details above.     XR CHEST PORTABLE    Result Date: 1/24/2025  EXAMINATION: TWO XRAY VIEWS  OF THE CHEST 1/24/2025 8:24 am COMPARISON: 07/08/2016 HISTORY: ORDERING SYSTEM PROVIDED HISTORY: confusion TECHNOLOGIST PROVIDED HISTORY: Reason for

## 2025-01-30 NOTE — PLAN OF CARE
Problem: Discharge Planning  Goal: Discharge to home or other facility with appropriate resources  1/30/2025 0025 by Ella Draper RN  Outcome: Progressing  1/29/2025 1110 by Bernarda Cotton RN  Outcome: Progressing     Problem: Pain  Goal: Verbalizes/displays adequate comfort level or baseline comfort level  1/30/2025 0025 by Ella Draper RN  Outcome: Progressing  1/29/2025 1110 by Bernarda Cotton RN  Outcome: Progressing     Problem: Safety - Adult  Goal: Free from fall injury  1/30/2025 0025 by Ella Draper RN  Outcome: Progressing  1/29/2025 1110 by Bernarda Cotton RN  Outcome: Progressing     Problem: ABCDS Injury Assessment  Goal: Absence of physical injury  1/30/2025 0025 by Ella Draper RN  Outcome: Progressing  1/29/2025 1110 by Bernarda Cotton RN  Outcome: Progressing     Problem: Neurosensory - Adult  Goal: Achieves stable or improved neurological status  1/30/2025 0025 by Ella Draper RN  Outcome: Progressing  1/29/2025 1110 by Bernarda Cotton RN  Outcome: Progressing  Goal: Absence of seizures  1/30/2025 0025 by Ella Draper RN  Outcome: Progressing  1/29/2025 1110 by Bernarda Cotton RN  Outcome: Progressing  Goal: Remains free of injury related to seizures activity  1/30/2025 0025 by Ella Draper RN  Outcome: Progressing  1/29/2025 1110 by Bernarda Cotton RN  Outcome: Progressing  Goal: Achieves maximal functionality and self care  1/30/2025 0025 by Ella Draper RN  Outcome: Progressing  1/29/2025 1110 by Bernarda Cotton RN  Outcome: Progressing     Problem: Skin/Tissue Integrity - Adult  Goal: Skin integrity remains intact  1/30/2025 0025 by Ella Draper RN  Outcome: Progressing  1/29/2025 1110 by Bernarda Cotton RN  Outcome: Progressing  Goal: Incisions, wounds, or drain sites healing without S/S of infection  1/30/2025 0025 by Ella Draper RN  Outcome: Progressing  1/29/2025 1110 by Bernarda Cotton RN  Outcome: Progressing  Goal: Oral mucous membranes remain intact  1/30/2025

## 2025-01-30 NOTE — PROGRESS NOTES
Physical Therapy Med Surg Daily Treatment Note  Facility/Department: [address] ORTHO TELE  Room: [facility]/[place] [address]       NAME: Sherrie Lundberg  : 1935 (89 y.o.)  MRN: [id number]  CODE STATUS: Full Code    Date of Service: 2025    Patient Diagnosis(es): Hallucinations [R44.3]  Altered mental status, unspecified altered mental status type [R41.82]  AMS (altered mental status) [R41.82]  Altered mental status [R41.82]   Chief Complaint   Patient presents with    Altered Mental Status     Patient Active Problem List    Diagnosis Date Noted    Altered mental status 2025    Metabolic encephalopathy 2025    Delusions (HCC) 2025    Hallucination 2025    AMS (altered mental status) 2025    Adenomatous polyp of sigmoid colon     Adenomatous polyp of descending colon     SBO (small bowel obstruction) (HCC) 10/26/2020    History of bladder cancer     Bladder cancer (HCC) 2020    Malignant neoplasm of posterior wall of urinary bladder (HCC)     Bladder tumor 2020    Tobacco use disorder 2020    Blood in stool     Gastric ulcer without hemorrhage, perforation, or obstruction     Personal history of colon cancer     Diverticulosis of large intestine without diverticulitis     Vitamin D deficiency 2015    Seasonal allergies     Cancer (HCC)     Cecal cancer (HCC)         Past Medical History:   Diagnosis Date    Allergic rhinitis     Arthritis     Breast cancer (HCC)     RT BREAST--dx --oral chemo -- OR    Cecal cancer (HCC)     dx --chemo    Diverticulitis     HTN (hypertension)     meds > 20 yrs    Hypercholesterolemia     past trx with meds -- off meds > 5 yrs    Seasonal allergies     Small bowel obstruction (HCC)     Tinnitus 2022     Past Surgical History:   Procedure Laterality Date    APPENDECTOMY      age 32s    BREAST SURGERY Right 1987    mastectomy    CATARACT REMOVAL      CHOLECYSTECTOMY  1970    COLONOSCOPY   &     COLONOSCOPY

## 2025-01-31 PROCEDURE — 2500000003 HC RX 250 WO HCPCS: Performed by: STUDENT IN AN ORGANIZED HEALTH CARE EDUCATION/TRAINING PROGRAM

## 2025-01-31 PROCEDURE — 6360000002 HC RX W HCPCS: Performed by: STUDENT IN AN ORGANIZED HEALTH CARE EDUCATION/TRAINING PROGRAM

## 2025-01-31 PROCEDURE — 6370000000 HC RX 637 (ALT 250 FOR IP): Performed by: PSYCHIATRY & NEUROLOGY

## 2025-01-31 PROCEDURE — 97116 GAIT TRAINING THERAPY: CPT

## 2025-01-31 PROCEDURE — 6370000000 HC RX 637 (ALT 250 FOR IP): Performed by: INTERNAL MEDICINE

## 2025-01-31 PROCEDURE — 1210000000 HC MED SURG R&B

## 2025-01-31 RX ADMIN — ENOXAPARIN SODIUM 40 MG: 100 INJECTION SUBCUTANEOUS at 09:32

## 2025-01-31 RX ADMIN — Medication 10 ML: at 21:31

## 2025-01-31 RX ADMIN — Medication 10 ML: at 09:33

## 2025-01-31 RX ADMIN — TRIAMTERENE AND HYDROCHLOROTHIAZIDE 1 TABLET: 37.5; 25 TABLET ORAL at 09:33

## 2025-01-31 RX ADMIN — MEMANTINE 5 MG: 10 TABLET ORAL at 09:33

## 2025-01-31 RX ADMIN — MEMANTINE 5 MG: 10 TABLET ORAL at 21:30

## 2025-01-31 RX ADMIN — TRAMADOL HYDROCHLORIDE 25 MG: 50 TABLET, COATED ORAL at 21:30

## 2025-01-31 RX ADMIN — TRAMADOL HYDROCHLORIDE 25 MG: 50 TABLET, COATED ORAL at 14:18

## 2025-01-31 ASSESSMENT — PAIN SCALES - GENERAL
PAINLEVEL_OUTOF10: 1
PAINLEVEL_OUTOF10: 0
PAINLEVEL_OUTOF10: 2

## 2025-01-31 ASSESSMENT — PAIN DESCRIPTION - LOCATION: LOCATION: BACK

## 2025-01-31 ASSESSMENT — PAIN DESCRIPTION - DESCRIPTORS: DESCRIPTORS: ACHING

## 2025-01-31 ASSESSMENT — PAIN DESCRIPTION - ORIENTATION: ORIENTATION: LOWER

## 2025-01-31 NOTE — PLAN OF CARE
Problem: Discharge Planning  Goal: Discharge to home or other facility with appropriate resources  1/31/2025 0959 by Aishwarya Crowell RN  Outcome: Progressing    Problem: Pain  Goal: Verbalizes/displays adequate comfort level or baseline comfort level  1/31/2025 0959 by Aishwarya Crowell RN  Outcome: Progressing     Problem: Safety - Adult  Goal: Free from fall injury  1/31/2025 0959 by Aishwarya Crowell RN  Outcome: Progressing     Problem: ABCDS Injury Assessment  Goal: Absence of physical injury  1/31/2025 0959 by Aishwarya Crowell RN  Outcome: Progressing     Problem: Neurosensory - Adult  Goal: Achieves stable or improved neurological status  1/31/2025 0959 by Aishwarya Crowell RN  Outcome: Progressing     Problem: Neurosensory - Adult  Goal: Absence of seizures  1/31/2025 0959 by Aishwarya Crowell RN  Outcome: Progressing     Problem: Neurosensory - Adult  Goal: Remains free of injury related to seizures activity  1/31/2025 0959 by Aishwarya Crowell RN  Outcome: Progressing     Problem: Neurosensory - Adult  Goal: Achieves maximal functionality and self care  1/31/2025 0959 by Aishwarya Crowell RN  Outcome: Progressing     Problem: Skin/Tissue Integrity - Adult  Goal: Skin integrity remains intact  1/31/2025 0959 by Aishwarya Crowell RN  Outcome: Progressing     Problem: Skin/Tissue Integrity - Adult  Goal: Incisions, wounds, or drain sites healing without S/S of infection  1/31/2025 0959 by Aishwarya Crowell RN  Outcome: Progressing     Problem: Skin/Tissue Integrity - Adult  Goal: Oral mucous membranes remain intact  1/31/2025 0959 by Aishwarya Crowell RN  Outcome: Progressing     Problem: Musculoskeletal - Adult  Goal: Return mobility to safest level of function  1/31/2025 0959 by Aishwarya Crowell RN  Outcome: Progressing     Problem: Musculoskeletal - Adult  Goal: Maintain proper alignment of affected body part  1/31/2025 0959 by Aishwarya Crowell RN  Outcome: Progressing     Problem:

## 2025-01-31 NOTE — PROGRESS NOTES
Physical Therapy Med Surg Daily Treatment Note  Facility/Department: [address] ORTHO TELE  Room: [facility]/[place] [address]       NAME: Sherrie Lundberg  : 1935 (89 y.o.)  MRN: [id number]  CODE STATUS: Full Code    Date of Service: 2025    Patient Diagnosis(es): Hallucinations [R44.3]  Altered mental status, unspecified altered mental status type [R41.82]  AMS (altered mental status) [R41.82]  Altered mental status [R41.82]   Chief Complaint   Patient presents with    Altered Mental Status     Patient Active Problem List    Diagnosis Date Noted    Altered mental status 2025    Metabolic encephalopathy 2025    Delusions (HCC) 2025    Hallucination 2025    AMS (altered mental status) 2025    Adenomatous polyp of sigmoid colon     Adenomatous polyp of descending colon     SBO (small bowel obstruction) (HCC) 10/26/2020    History of bladder cancer     Bladder cancer (HCC) 2020    Malignant neoplasm of posterior wall of urinary bladder (HCC)     Bladder tumor 2020    Tobacco use disorder 2020    Blood in stool     Gastric ulcer without hemorrhage, perforation, or obstruction     Personal history of colon cancer     Diverticulosis of large intestine without diverticulitis     Vitamin D deficiency 2015    Seasonal allergies     Cancer (HCC)     Cecal cancer (HCC)         Past Medical History:   Diagnosis Date    Allergic rhinitis     Arthritis     Breast cancer (HCC)     RT BREAST--dx --oral chemo -- OR    Cecal cancer (HCC)     dx --chemo    Diverticulitis     HTN (hypertension)     meds > 20 yrs    Hypercholesterolemia     past trx with meds -- off meds > 5 yrs    Seasonal allergies     Small bowel obstruction (HCC)     Tinnitus 2022     Past Surgical History:   Procedure Laterality Date    APPENDECTOMY      age 32s    BREAST SURGERY Right 1987    mastectomy    CATARACT REMOVAL      CHOLECYSTECTOMY  1970    COLONOSCOPY   &     COLONOSCOPY

## 2025-01-31 NOTE — CARE COORDINATION
Received notification from [employer] that patient/POA appealed discharge.Called and spoke with KELSEY Cordova, read DND over phone and verbalized understanding.    Discussed progress on finding an AL for patient. States he has not heard back from [facility] and informed him I would reach out to push for a call back. Also stated he is touring [facility] and stated they would do an onsite with patient tomorrow. Discussed therapy level and concerns, also discussed Jose's recommendations and follow up for outpatient workup and will be on discharge paperwork. Spoke with Neurology NP, follow up requested for 6 weeks.  scheduled appointment and is in discharge paperwork.     Spoke with Ruma at [facility], she reached out to the AL coordinator to speed up progress.    1558: Documents uploaded to [employer] via [employer] portal. [id number]   Upload successful. Your Submission Tracking ID is [id number]     John Medina MSN, RN Mgr Case Mgmt.

## 2025-01-31 NOTE — PLAN OF CARE
Problem: Discharge Planning  Goal: Discharge to home or other facility with appropriate resources  1/30/2025 2324 by Ella Draper RN  Outcome: Progressing  1/30/2025 1148 by Aishwarya Crowell RN  Outcome: Progressing     Problem: Pain  Goal: Verbalizes/displays adequate comfort level or baseline comfort level  1/30/2025 2324 by Ella Draper RN  Outcome: Progressing  1/30/2025 1148 by Aishwarya Crowell RN  Outcome: Progressing    Problem: Safety - Adult  Goal: Free from fall injury  1/30/2025 2324 by Ella Draper RN  Outcome: Progressing  1/30/2025 1148 by Aishwarya Crowell RN  Outcome: Progressing     Problem: ABCDS Injury Assessment  Goal: Absence of physical injury  1/30/2025 2324 by Ella Draper RN  Outcome: Progressing  1/30/2025 1148 by Aishwarya Crowell RN  Outcome: Progressing     Problem: Neurosensory - Adult  Goal: Achieves stable or improved neurological status  1/30/2025 2324 by Ella Draper RN  Outcome: Progressing  1/30/2025 1148 by Aishwarya Crowell RN  Outcome: Progressing  Goal: Absence of seizures  1/30/2025 2324 by Ella Draper RN  Outcome: Progressing  1/30/2025 1148 by Aishwarya Crowell RN  Outcome: Progressing  Goal: Remains free of injury related to seizures activity  1/30/2025 2324 by Ella Draper RN  Outcome: Progressing  1/30/2025 1148 by Aishwarya Crowell RN  Outcome: Progressing  Goal: Achieves maximal functionality and self care  1/30/2025 2324 by Ella Draper RN  Outcome: Progressing  1/30/2025 1148 by Aishwarya Crowell RN  Outcome: Progressing     Problem: Skin/Tissue Integrity - Adult  Goal: Skin integrity remains intact  1/30/2025 2324 by Ella Draper RN  Outcome: Progressing  1/30/2025 1148 by Aishwarya Crowell RN  Outcome: Progressing  Goal: Incisions, wounds, or drain sites healing without S/S of infection  1/30/2025 2324 by Ella Draper RN  Outcome: Progressing  1/30/2025 1148 by Aishwarya Crowell RN  Outcome: Progressing  Goal: Oral

## 2025-01-31 NOTE — PROGRESS NOTES
Hospitalist Progress Note      PCP: Alysha Castillo MD    Date of Admission: 1/24/2025    Chief Complaint:    Chief Complaint   Patient presents with    Altered Mental Status     Subjective:  No acute events overnight.  Patient has been discharged and is waiting for assisted living placement.  Discussed with social work and case management    Medications:  Reviewed    Infusion Medications    sodium chloride       Scheduled Medications    memantine  5 mg Oral BID    sodium chloride flush  5-40 mL IntraVENous 2 times per day    enoxaparin  40 mg SubCUTAneous Daily    traMADol  25 mg Oral Q8H    triamterene-hydroCHLOROthiazide  1 tablet Oral Daily     PRN Meds: haloperidol lactate, sodium chloride flush, sodium chloride, potassium chloride **OR** potassium alternative oral replacement **OR** potassium chloride, magnesium sulfate, ondansetron **OR** ondansetron, polyethylene glycol, acetaminophen **OR** acetaminophen      Intake/Output Summary (Last 24 hours) at 1/31/2025 1317  Last data filed at 1/31/2025 0933  Gross per 24 hour   Intake 250 ml   Output --   Net 250 ml       Exam:  BP (!) 130/45   Pulse 64   Temp 97.5 °F (36.4 °C) (Oral)   Resp 16   Ht 1.575 m (5' 2\")   Wt 70.4 kg (155 lb 1.6 oz)   LMP 06/30/1980   SpO2 93%   BMI 28.37 kg/m²   Physical Exam  Cardiovascular:      Rate and Rhythm: Normal rate and regular rhythm.   Pulmonary:      Effort: Pulmonary effort is normal. No respiratory distress.   Abdominal:      Palpations: Abdomen is soft.      Tenderness: There is no abdominal tenderness.   Neurological:      Mental Status: She is alert and oriented to person, place, and time.   Psychiatric:         Mood and Affect: Mood normal.         Behavior: Behavior normal.       Labs:   Recent Labs     01/29/25  1436   WBC 5.4   HGB 15.4   HCT 47.0   PLT NOT DONE       Recent Labs     01/29/25  1436      K 5.1*   CL 97   CO2 29   BUN 27*   CREATININE 0.81   CALCIUM 9.4     No results for input(s):

## 2025-02-01 PROCEDURE — 1210000000 HC MED SURG R&B

## 2025-02-01 PROCEDURE — 6370000000 HC RX 637 (ALT 250 FOR IP): Performed by: INTERNAL MEDICINE

## 2025-02-01 PROCEDURE — 6360000002 HC RX W HCPCS: Performed by: STUDENT IN AN ORGANIZED HEALTH CARE EDUCATION/TRAINING PROGRAM

## 2025-02-01 PROCEDURE — 6370000000 HC RX 637 (ALT 250 FOR IP): Performed by: PSYCHIATRY & NEUROLOGY

## 2025-02-01 PROCEDURE — 2500000003 HC RX 250 WO HCPCS: Performed by: STUDENT IN AN ORGANIZED HEALTH CARE EDUCATION/TRAINING PROGRAM

## 2025-02-01 RX ADMIN — TRAMADOL HYDROCHLORIDE 25 MG: 50 TABLET, COATED ORAL at 16:20

## 2025-02-01 RX ADMIN — TRAMADOL HYDROCHLORIDE 25 MG: 50 TABLET, COATED ORAL at 05:51

## 2025-02-01 RX ADMIN — ENOXAPARIN SODIUM 40 MG: 100 INJECTION SUBCUTANEOUS at 09:22

## 2025-02-01 RX ADMIN — MEMANTINE 5 MG: 10 TABLET ORAL at 09:22

## 2025-02-01 RX ADMIN — MEMANTINE 5 MG: 10 TABLET ORAL at 20:59

## 2025-02-01 RX ADMIN — Medication 10 ML: at 21:00

## 2025-02-01 RX ADMIN — Medication 10 ML: at 09:22

## 2025-02-01 ASSESSMENT — PAIN SCALES - GENERAL
PAINLEVEL_OUTOF10: 4
PAINLEVEL_OUTOF10: 3

## 2025-02-01 NOTE — PLAN OF CARE
Problem: Discharge Planning  Goal: Discharge to home or other facility with appropriate resources  Outcome: Progressing  Flowsheets (Taken 2/1/2025 0920)  Discharge to home or other facility with appropriate resources:   Identify barriers to discharge with patient and caregiver   Arrange for needed discharge resources and transportation as appropriate   Identify discharge learning needs (meds, wound care, etc)   Arrange for interpreters to assist at discharge as needed   Refer to discharge planning if patient needs post-hospital services based on physician order or complex needs related to functional status, cognitive ability or social support system     Problem: Pain  Goal: Verbalizes/displays adequate comfort level or baseline comfort level  Outcome: Progressing  Flowsheets (Taken 2/1/2025 0929)  Verbalizes/displays adequate comfort level or baseline comfort level:   Encourage patient to monitor pain and request assistance   Assess pain using appropriate pain scale   Administer analgesics based on type and severity of pain and evaluate response   Implement non-pharmacological measures as appropriate and evaluate response   Consider cultural and social influences on pain and pain management   Notify Licensed Independent Practitioner if interventions unsuccessful or patient reports new pain     Problem: Safety - Adult  Goal: Free from fall injury  Outcome: Progressing     Problem: ABCDS Injury Assessment  Goal: Absence of physical injury  Outcome: Progressing     Problem: Neurosensory - Adult  Goal: Achieves stable or improved neurological status  Outcome: Progressing  Flowsheets (Taken 2/1/2025 0920)  Achieves stable or improved neurological status:   Assess for and report changes in neurological status   Initiate measures to prevent increased intracranial pressure   Maintain blood pressure and fluid volume within ordered parameters to optimize cerebral perfusion and minimize risk of hemorrhage   Monitor

## 2025-02-01 NOTE — CARE COORDINATION
MET WITH PATIENT AT BEDSIDE. PATIENT GAVE PERMISSION TO CALL [facility].  SPOKE WITH [facility] POA AND PER [facility]CTOR HE WILL BE PICKING PATIENT UP TOMORROW AT NOON AND TAKING PATIENT TO [facility] IN [place].  DISCHARGE PLAN [facility]. KELSEY BARNEY WILL BE TRANSPORTING PATIENT.

## 2025-02-01 NOTE — PROGRESS NOTES
Hospitalist Progress Note      PCP: Alysha Castillo MD    Date of Admission: 1/24/2025    Chief Complaint:    Chief Complaint   Patient presents with    Altered Mental Status     Subjective:  No acute events overnight.     Medications:  Reviewed    Infusion Medications    sodium chloride       Scheduled Medications    memantine  5 mg Oral BID    sodium chloride flush  5-40 mL IntraVENous 2 times per day    enoxaparin  40 mg SubCUTAneous Daily    traMADol  25 mg Oral Q8H    triamterene-hydroCHLOROthiazide  1 tablet Oral Daily     PRN Meds: haloperidol lactate, sodium chloride flush, sodium chloride, potassium chloride **OR** potassium alternative oral replacement **OR** potassium chloride, magnesium sulfate, ondansetron **OR** ondansetron, polyethylene glycol, acetaminophen **OR** acetaminophen    No intake or output data in the 24 hours ending 02/01/25 1313      Exam:  BP (!) 107/37   Pulse 60   Temp 97.6 °F (36.4 °C) (Oral)   Resp 18   Ht 1.575 m (5' 2\")   Wt 70.4 kg (155 lb 1.6 oz)   LMP 06/30/1980   SpO2 96%   BMI 28.37 kg/m²   Physical Exam  Cardiovascular:      Rate and Rhythm: Normal rate and regular rhythm.   Pulmonary:      Effort: Pulmonary effort is normal. No respiratory distress.   Abdominal:      Palpations: Abdomen is soft.      Tenderness: There is no abdominal tenderness.   Neurological:      Mental Status: She is alert and oriented to person, place, and time.   Psychiatric:         Mood and Affect: Mood normal.         Behavior: Behavior normal.       Labs:   Recent Labs     01/29/25  1436   WBC 5.4   HGB 15.4   HCT 47.0   PLT NOT DONE       Recent Labs     01/29/25  1436      K 5.1*   CL 97   CO2 29   BUN 27*   CREATININE 0.81   CALCIUM 9.4     No results for input(s): \"AST\", \"ALT\", \"BILIDIR\", \"BILITOT\", \"ALKPHOS\" in the last 72 hours.    No results for input(s): \"INR\" in the last 72 hours.    No results for input(s): \"CKTOTAL\", \"TROPONINI\" in the last 72 hours.    Urinalysis:

## 2025-02-01 NOTE — PLAN OF CARE
Problem: Discharge Planning  Goal: Discharge to home or other facility with appropriate resources  1/31/2025 2301 by Taye Sears RN  Outcome: Progressing  1/31/2025 0959 by Aishwarya Crowell RN  Outcome: Progressing     Problem: Pain  Goal: Verbalizes/displays adequate comfort level or baseline comfort level  1/31/2025 2301 by Taye Sears RN  Outcome: Progressing  1/31/2025 0959 by Aishwarya Crowell RN  Outcome: Progressing     Problem: Safety - Adult  Goal: Free from fall injury  1/31/2025 2301 by Taye Sears RN  Outcome: Progressing  1/31/2025 0959 by Aishwarya Crowell RN  Outcome: Progressing     Problem: ABCDS Injury Assessment  Goal: Absence of physical injury  1/31/2025 2301 by Taye Sears RN  Outcome: Progressing  1/31/2025 0959 by Aishwarya Crowell RN  Outcome: Progressing     Problem: Neurosensory - Adult  Goal: Achieves stable or improved neurological status  1/31/2025 2301 by Taye Sears RN  Outcome: Progressing  1/31/2025 0959 by Aishwarya Crowell RN  Outcome: Progressing  Goal: Absence of seizures  1/31/2025 2301 by Taye Sears RN  Outcome: Progressing  1/31/2025 0959 by Aishwarya Crowell RN  Outcome: Progressing  Goal: Remains free of injury related to seizures activity  1/31/2025 2301 by Taye Sears RN  Outcome: Progressing  1/31/2025 0959 by Aishwarya Crowell RN  Outcome: Progressing  Goal: Achieves maximal functionality and self care  1/31/2025 2301 by Taye Sears RN  Outcome: Progressing  1/31/2025 0959 by Aishwarya Crowell RN  Outcome: Progressing     Problem: Skin/Tissue Integrity - Adult  Goal: Skin integrity remains intact  1/31/2025 2301 by Taye Sears RN  Outcome: Progressing  1/31/2025 0959 by Aishwarya Crowell RN  Outcome: Progressing  Goal: Incisions, wounds, or drain sites healing without S/S of infection  1/31/2025 2301 by Taye Sears RN  Outcome: Progressing  1/31/2025 0959 by Aishwarya Crowell RN  Outcome: Progressing  Goal: Oral

## 2025-02-02 VITALS
WEIGHT: 155.1 LBS | TEMPERATURE: 97.3 F | OXYGEN SATURATION: 94 % | HEIGHT: 62 IN | SYSTOLIC BLOOD PRESSURE: 131 MMHG | DIASTOLIC BLOOD PRESSURE: 94 MMHG | RESPIRATION RATE: 17 BRPM | BODY MASS INDEX: 28.54 KG/M2 | HEART RATE: 68 BPM

## 2025-02-02 PROCEDURE — 6360000002 HC RX W HCPCS: Performed by: STUDENT IN AN ORGANIZED HEALTH CARE EDUCATION/TRAINING PROGRAM

## 2025-02-02 PROCEDURE — 6370000000 HC RX 637 (ALT 250 FOR IP): Performed by: INTERNAL MEDICINE

## 2025-02-02 PROCEDURE — 6370000000 HC RX 637 (ALT 250 FOR IP): Performed by: PSYCHIATRY & NEUROLOGY

## 2025-02-02 RX ADMIN — ENOXAPARIN SODIUM 40 MG: 100 INJECTION SUBCUTANEOUS at 09:58

## 2025-02-02 RX ADMIN — TRAMADOL HYDROCHLORIDE 25 MG: 50 TABLET, COATED ORAL at 00:24

## 2025-02-02 RX ADMIN — TRIAMTERENE AND HYDROCHLOROTHIAZIDE 1 TABLET: 37.5; 25 TABLET ORAL at 09:58

## 2025-02-02 RX ADMIN — MEMANTINE 5 MG: 10 TABLET ORAL at 09:58

## 2025-02-02 ASSESSMENT — PAIN SCALES - GENERAL: PAINLEVEL_OUTOF10: 4

## 2025-02-02 ASSESSMENT — PAIN DESCRIPTION - LOCATION: LOCATION: BACK

## 2025-02-02 NOTE — PLAN OF CARE
Problem: Discharge Planning  Goal: Discharge to home or other facility with appropriate resources  2/2/2025 1103 by Rose Khan RN  Outcome: Adequate for Discharge  2/2/2025 0157 by Lynn Jimenez RN  Outcome: Progressing     Problem: Pain  Goal: Verbalizes/displays adequate comfort level or baseline comfort level  2/2/2025 1103 by Rose Khan RN  Outcome: Adequate for Discharge  2/2/2025 0157 by Lynn Jimenez RN  Outcome: Progressing     Problem: Safety - Adult  Goal: Free from fall injury  2/2/2025 1103 by Rose Khan RN  Outcome: Adequate for Discharge  2/2/2025 0157 by Lynn Jimenez RN  Outcome: Progressing     Problem: ABCDS Injury Assessment  Goal: Absence of physical injury  2/2/2025 1103 by Rose Khan RN  Outcome: Adequate for Discharge  2/2/2025 0157 by Lynn Jimenez RN  Outcome: Progressing     Problem: Neurosensory - Adult  Goal: Achieves stable or improved neurological status  2/2/2025 1103 by Rose Khan RN  Outcome: Adequate for Discharge  2/2/2025 0157 by Lynn Jimenez RN  Outcome: Progressing  Goal: Absence of seizures  2/2/2025 1103 by Rose Khan RN  Outcome: Adequate for Discharge  2/2/2025 0157 by Lynn Jimenez RN  Outcome: Progressing  Goal: Remains free of injury related to seizures activity  2/2/2025 1103 by Rose Khan RN  Outcome: Adequate for Discharge  2/2/2025 0157 by Lynn Jimenez RN  Outcome: Progressing  Goal: Achieves maximal functionality and self care  2/2/2025 1103 by Rose Khan RN  Outcome: Adequate for Discharge  2/2/2025 0157 by Lynn Jimenez RN  Outcome: Progressing     Problem: Skin/Tissue Integrity - Adult  Goal: Skin integrity remains intact  Description: 1.  Monitor for areas of redness and/or skin breakdown  2.  Assess vascular access sites hourly  3.  Every 4-6 hours minimum:  Change oxygen saturation probe site  4.  Every 4-6 hours:  If on nasal continuous positive airway pressure,

## 2025-02-02 NOTE — CARE COORDINATION
MET WITH PATIENT AT BEDSIDE TO DISCUSS DISCHARGE PLAN. PATIENT IN BATHROOM KELSEY BARNEY AT BEDSIDE TO TRANSPORT PATIENT TO [facility].  DISCHARGE PLAN [facility].

## 2025-02-02 NOTE — DISCHARGE INSTR - COC
Continuity of Care Form    Patient Name: Sherrie Lundberg   :  1935  MRN:  [id number]    Admit date:  2025  Discharge date:  25    Code Status Order: Full Code   Advance Directives:   Advance Care Flowsheet Documentation             Admitting Physician:  Coleen Cotton MD  PCP: Alysha Castillo MD    Discharging Nurse: Rose Khan RN BSB  Discharging Hospital Unit/Room#: W287/W287-01  Discharging Unit Phone Number: [phone]    Emergency Contact:   Extended Emergency Contact Information  Primary Emergency Contact: Deandra Albert  Home Phone: [phone]  Mobile Phone: [phone]  Relation: Other   needed? No  Secondary Emergency Contact: Art Albert   [facility]  Home Phone: [phone]  Work Phone: [phone]  Mobile Phone: [phone]  Relation: Other    Past Surgical History:  Past Surgical History:   Procedure Laterality Date    APPENDECTOMY      age 32s    BREAST SURGERY Right 1987    mastectomy    CATARACT REMOVAL      CHOLECYSTECTOMY  1970    COLONOSCOPY   &     COLONOSCOPY  13,1/10/11          COLONOSCOPY  1/21/15    polypectomy     COLONOSCOPY N/A 2021    COLORECTAL CANCER SCREENING, HIGH RISK performed by Kadeem Dunn MD at [facility]    CYSTOSCOPY N/A 2020    CYSTOSCOPY TURBT RESECTOSCOPE performed by Davian Gama MD at [facility]    CYSTOSCOPY N/A 2022    CYSTOSCOPY BILATERAL PYELOGRAM performed by Davian Gama MD at [facility] OR    CYSTOSCOPY N/A 2022    BIOPSY OF BLADDER performed by Davian Gama MD at [facility] OR    CYSTOSCOPY N/A 3/2/2022    CYSTOSCOPY performed by Davian Gama MD at [facility] OR    CYSTOSCOPY W BIOPSY OF BLADDER N/A 2020    CYSTOSCOPY BLADDER BIOPSY FULGURATION performed by Davian Gama MD at [facility] OR    ENDOSCOPY, COLON, DIAGNOSTIC      EYE SURGERY      Phaco with IOL OU    EYE SURGERY      Lasik OU    HERNIA REPAIR Right     ING. HERNIA REPAIN W/

## 2025-02-02 NOTE — PLAN OF CARE
Problem: Discharge Planning  Goal: Discharge to home or other facility with appropriate resources  2/2/2025 1249 by Rose Khan RN  Outcome: Completed  2/2/2025 1103 by Rose Khan RN  Outcome: Adequate for Discharge  2/2/2025 0157 by Lynn Jimenez RN  Outcome: Progressing     Problem: Pain  Goal: Verbalizes/displays adequate comfort level or baseline comfort level  2/2/2025 1249 by Rose Khan RN  Outcome: Completed  2/2/2025 1103 by Rose Khan RN  Outcome: Adequate for Discharge  2/2/2025 0157 by Lynn Jimenez RN  Outcome: Progressing     Problem: Safety - Adult  Goal: Free from fall injury  2/2/2025 1249 by Rose Khan RN  Outcome: Completed  2/2/2025 1103 by Rose Khan RN  Outcome: Adequate for Discharge  2/2/2025 0157 by Lynn Jmienez RN  Outcome: Progressing     Problem: ABCDS Injury Assessment  Goal: Absence of physical injury  2/2/2025 1249 by Rose Khan RN  Outcome: Completed  2/2/2025 1103 by Rose Khan RN  Outcome: Adequate for Discharge  2/2/2025 0157 by Lynn Jimenez RN  Outcome: Progressing     Problem: Neurosensory - Adult  Goal: Achieves stable or improved neurological status  2/2/2025 1249 by Rose Khan RN  Outcome: Completed  2/2/2025 1103 by Rose Khan RN  Outcome: Adequate for Discharge  2/2/2025 0157 by Lynn Jimenez RN  Outcome: Progressing  Goal: Absence of seizures  2/2/2025 1249 by Rose Khan RN  Outcome: Completed  2/2/2025 1103 by Rose Khan RN  Outcome: Adequate for Discharge  2/2/2025 0157 by Lynn Jimenez RN  Outcome: Progressing  Goal: Remains free of injury related to seizures activity  2/2/2025 1249 by Rose Khan RN  Outcome: Completed  2/2/2025 1103 by Rose Khan, RN  Outcome: Adequate for Discharge  2/2/2025 0157 by Lynn Jimenez, RN  Outcome: Progressing  Goal: Achieves maximal functionality and self care  2/2/2025 1249 by Rose Khan, RN  Outcome:

## 2025-02-02 NOTE — PROGRESS NOTES
Patient discharge at 1251.Vital signs Temp 97.3 oral, pulse 68, respirations 17, bp 131/94, 94% Room air. All discharge instructions explained [facility]  Advised patient and POA to follow up with primary care team within 7-10 days of discharge. Home medications ordered to  patients prefered pharmacy. Patient escorted to car  and accompanied by KELSEY Cordova. Patient has all personal belongings with them. Patient transferred into car independently with walker.

## 2025-02-05 NOTE — PROCEDURES
[facility]                   [address] [place] [address]                          ELECTROENCEPHALOGRAM      PATIENT NAME: CHRISTOPH YIN             : 1935  MED REC NO: [id number]                        ROOM: [id number]  ACCOUNT NO: [id number]                       ADMIT DATE: 2025  PROVIDER: Morro Thornton MD      REFERRING PHYSICIAN:  MALVIN DYE    MEDICATIONS:  Ultram, Zofran, Lovenox, Namenda.    DESCRIPTION:  This is a spontaneous 21-channel EEG recording for this patient with hallucinations.  The background rhythm of EEG shows a well-regulated 8-9 hertz posterior dominant rhythm of alpha.  This is symmetrical and attenuates with eye opening.  Photic stimulation was performed without any photic responses.  Hyperventilation is not performed.  The record remains symmetrical without any suggestion of asymmetries or epileptiform discharge.  Muscle activity noted.    IMPRESSION:  This is essentially normal EEG recording.  Clinical correlation recommended.          MORRO THORNTON MD      D:  2025 10:58:18     T:  2025 12:00:52     P/SOPHY  Job #:  [id number]     Doc#:  [id number]

## 2025-04-24 ENCOUNTER — OFFICE VISIT (OUTPATIENT)
Age: 89
End: 2025-04-24
Payer: MEDICARE

## 2025-04-24 VITALS
WEIGHT: 152 LBS | BODY MASS INDEX: 27.8 KG/M2 | DIASTOLIC BLOOD PRESSURE: 64 MMHG | SYSTOLIC BLOOD PRESSURE: 120 MMHG | HEART RATE: 70 BPM

## 2025-04-24 DIAGNOSIS — R44.3 HALLUCINATIONS: ICD-10-CM

## 2025-04-24 DIAGNOSIS — F03.B0 MODERATE DEMENTIA, UNSPECIFIED DEMENTIA TYPE, UNSPECIFIED WHETHER BEHAVIORAL, PSYCHOTIC, OR MOOD DISTURBANCE OR ANXIETY (HCC): ICD-10-CM

## 2025-04-24 DIAGNOSIS — Z09 HOSPITAL DISCHARGE FOLLOW-UP: Primary | ICD-10-CM

## 2025-04-24 PROCEDURE — 1123F ACP DISCUSS/DSCN MKR DOCD: CPT | Performed by: NURSE PRACTITIONER

## 2025-04-24 PROCEDURE — 1160F RVW MEDS BY RX/DR IN RCRD: CPT | Performed by: NURSE PRACTITIONER

## 2025-04-24 PROCEDURE — 1159F MED LIST DOCD IN RCRD: CPT | Performed by: NURSE PRACTITIONER

## 2025-04-24 PROCEDURE — 99212 OFFICE O/P EST SF 10 MIN: CPT | Performed by: NURSE PRACTITIONER

## 2025-04-24 ASSESSMENT — ENCOUNTER SYMPTOMS
SHORTNESS OF BREATH: 0
COUGH: 0
NAUSEA: 0
WHEEZING: 0
CONSTIPATION: 0
VOMITING: 0
TROUBLE SWALLOWING: 0
COLOR CHANGE: 0
DIARRHEA: 0
CHEST TIGHTNESS: 0

## 2025-04-24 NOTE — PROGRESS NOTES
01/29/2025 02:36 PM    CO2 29 01/29/2025 02:36 PM    BUN 27 01/29/2025 02:36 PM    CREATININE 0.81 01/29/2025 02:36 PM    GFRAA >60.0 02/24/2022 10:11 AM    LABGLOM 69.9 01/29/2025 02:36 PM    GLUCOSE 82 01/29/2025 02:36 PM    GLUCOSE 138 01/01/2012 05:45 AM    CALCIUM 9.4 01/29/2025 02:36 PM    BILITOT 0.7 01/24/2025 07:16 AM    ALKPHOS 85 01/24/2025 07:16 AM    AST 26 01/24/2025 07:16 AM    ALT 17 01/24/2025 07:16 AM     Lab Results   Component Value Date/Time    PROTIME 13.5 01/24/2025 07:16 AM    INR 1.0 01/24/2025 07:16 AM     Lab Results   Component Value Date/Time    TSH 4.200 01/25/2025 12:09 PM    ZBSQVHQX50 771 01/25/2025 12:09 PM    FOLATE 36.7 01/25/2025 12:09 PM     Lab Results   Component Value Date/Time    TRIG 96 11/10/2013 09:12 AM    HDL 79 11/10/2013 09:12 AM     No results found for: \"LABAMPH\", \"BARBSCNU\", \"LABBENZ\", \"CANNAB\", \"LABMETH\", \"PPXUR\", \"ETOH\"  No results found for: \"LITHIUM\", \"DILFRTOT\", \"VALPROATE\"    Assessment and Plan:      1. Hospital discharge follow-up  2. Moderate dementia, unspecified dementia type, unspecified whether behavioral, psychotic, or mood disturbance or anxiety (HCC)  3. Hallucinations  Patient hospitalized in January 2025 at [facility] due to episode of hallucination and delusions.  There was concern of early dementia.  Was trialed on Depakote but patient had side effect of significant somnolence and lethargy and therefore it was discontinued.  Workup included CT of the head that was negative for acute findings.  EEG that was negative.  B12 that was normal.  Folate that was elevated.  TSH mildly elevated at 4.2.  She was then started on Namenda 5 mg twice daily which she continues taking.  Tolerating without adverse effects.  Since hospital discharge patient has been doing well.  Had 1 subsequent brief episode of hallucination which she recognized.  No history of CVA, TIA, TBI, seizures.  MMSE was assessed today and 27 out of 30.  Patient is alert and oriented x

## (undated) DEVICE — FOOT SWITCH DRAPE: Brand: UNBRANDED

## (undated) DEVICE — PAD N ADH W3XL4IN POLY COT SFT PERF FLM EASILY CUT ABSRB

## (undated) DEVICE — BAG DRAINAGE URIN LIGEMAN W/ ADPT SUCTION HOSE CYSTO URO

## (undated) DEVICE — GUIDEWIRE ENDOSCP L150CM DIA0.038IN TIP L3CM STD PTFE ANG

## (undated) DEVICE — JELLY,LUBE,STERILE,FLIP TOP,TUBE,2-OZ: Brand: MEDLINE

## (undated) DEVICE — SPONGE GZ W4XL4IN COT 12 PLY TYP VII WVN C FLD DSGN

## (undated) DEVICE — ELECTRODE PT RET AD L9FT HI MOIST COND ADH HYDRGEL CORDED

## (undated) DEVICE — BRUSH ENDO CLN L90.5IN SHTH DIA1.7MM BRIST DIA5-7MM 2-6MM

## (undated) DEVICE — GOWN,SIRUS,POLYRNF,BRTHSLV,XLN/XL,20/CS: Brand: MEDLINE

## (undated) DEVICE — GOWN,AURORA,NONREINFORCED,LARGE: Brand: MEDLINE

## (undated) DEVICE — ANGLED TIP URETERAL CATHETER WITH BENTSON PTFE WIRE GUIDE: Brand: ANGLED TIP

## (undated) DEVICE — ENDO CARRY-ON PROCEDURE KIT: Brand: ENDO CARRY-ON PROCEDURE KIT

## (undated) DEVICE — TOTAL TRAY, DB, 100% SILI FOLEY, 16FR 10: Brand: MEDLINE

## (undated) DEVICE — Device

## (undated) DEVICE — CABLE ENDOSCP L10FT ACT DISP

## (undated) DEVICE — TUR/ENDOSCOPIC CABLE, 10' (3.05 M): Brand: CONMED

## (undated) DEVICE — SET,IRRIGATION,CYSTO/TUR: Brand: MEDLINE

## (undated) DEVICE — COVER FT SWCH W15XL17IN GRY ALL OEC SYS

## (undated) DEVICE — DBD-PACK,CYSTOSCOPY,PK VI,AURORA: Brand: MEDLINE

## (undated) DEVICE — SYRINGE MED 10ML LUERLOCK TIP W/O SFTY DISP

## (undated) DEVICE — SINGLE PORT MANIFOLD: Brand: NEPTUNE 2

## (undated) DEVICE — SKIN PREP TRAY 4 COMPARTM TRAY: Brand: MEDLINE INDUSTRIES, INC.

## (undated) DEVICE — TRAY PREP DRY W/ PREM GLV 2 APPL 6 SPNG 2 UNDPD 1 OVERWRAP

## (undated) DEVICE — TUBING, SUCTION, 1/4" X 10', STRAIGHT: Brand: MEDLINE

## (undated) DEVICE — TUBE SET 96 MM 64 MM H2O PERISTALTIC STD AUX CHANNEL

## (undated) DEVICE — Device: Brand: ENDO SMARTCAP

## (undated) DEVICE — GLOVE ORANGE PI 8   MSG9080

## (undated) DEVICE — LABEL MED MINI W/ MARKER

## (undated) DEVICE — TOWEL,OR,DSP,ST,BLUE,STD,4/PK,20PK/CS: Brand: MEDLINE

## (undated) DEVICE — AGENT CNTRST IOTHALAMATE MEGLUMINE 60% 30 ML INJ CONRAY 60

## (undated) DEVICE — Z DISCONTINUED NO SUB IDED ELECTRODE MPLR 24FR LOOP CUT ENDOSCP DISP CIRCON ACMI USA

## (undated) DEVICE — EVACUATOR URO BLDR W/ ADPT UROVAC

## (undated) DEVICE — CONTAINER,SPECIMEN,OR STERILE,4OZ: Brand: MEDLINE

## (undated) DEVICE — ADAPTER FLSH PMP FLD MGMT GI IRRIG OFP 2 DISPOSABLE

## (undated) DEVICE — FORCEPS BX 240CM 2.4MM L NDL RAD JAW 4 M00513334